# Patient Record
Sex: FEMALE | Race: BLACK OR AFRICAN AMERICAN | Employment: UNEMPLOYED | URBAN - METROPOLITAN AREA
[De-identification: names, ages, dates, MRNs, and addresses within clinical notes are randomized per-mention and may not be internally consistent; named-entity substitution may affect disease eponyms.]

---

## 2019-01-16 ENCOUNTER — APPOINTMENT (OUTPATIENT)
Dept: GENERAL RADIOLOGY | Age: 39
End: 2019-01-16
Payer: COMMERCIAL

## 2019-01-16 ENCOUNTER — HOSPITAL ENCOUNTER (EMERGENCY)
Age: 39
Discharge: HOME | End: 2019-01-16
Payer: COMMERCIAL

## 2019-01-16 VITALS
RESPIRATION RATE: 14 BRPM | TEMPERATURE: 98.7 F | DIASTOLIC BLOOD PRESSURE: 86 MMHG | HEART RATE: 76 BPM | WEIGHT: 257 LBS | SYSTOLIC BLOOD PRESSURE: 170 MMHG | OXYGEN SATURATION: 98 % | HEIGHT: 68 IN | BODY MASS INDEX: 38.95 KG/M2

## 2019-01-16 DIAGNOSIS — S39.012A BACK STRAIN, INITIAL ENCOUNTER: Primary | ICD-10-CM

## 2019-01-16 LAB
HCG, URINE, POC: NEGATIVE
Lab: NORMAL
NEGATIVE QC PASS/FAIL: NORMAL
POSITIVE QC PASS/FAIL: NORMAL

## 2019-01-16 PROCEDURE — 6360000002 HC RX W HCPCS: Performed by: PHYSICIAN ASSISTANT

## 2019-01-16 PROCEDURE — 99283 EMERGENCY DEPT VISIT LOW MDM: CPT

## 2019-01-16 PROCEDURE — 96372 THER/PROPH/DIAG INJ SC/IM: CPT

## 2019-01-16 PROCEDURE — 72110 X-RAY EXAM L-2 SPINE 4/>VWS: CPT

## 2019-01-16 RX ORDER — ORPHENADRINE CITRATE 30 MG/ML
60 INJECTION INTRAMUSCULAR; INTRAVENOUS ONCE
Status: COMPLETED | OUTPATIENT
Start: 2019-01-16 | End: 2019-01-16

## 2019-01-16 RX ORDER — KETOROLAC TROMETHAMINE 30 MG/ML
30 INJECTION, SOLUTION INTRAMUSCULAR; INTRAVENOUS ONCE
Status: COMPLETED | OUTPATIENT
Start: 2019-01-16 | End: 2019-01-16

## 2019-01-16 RX ORDER — CYCLOBENZAPRINE HCL 10 MG
10 TABLET ORAL 3 TIMES DAILY PRN
Qty: 15 TABLET | Refills: 0 | Status: SHIPPED | OUTPATIENT
Start: 2019-01-16 | End: 2019-01-21

## 2019-01-16 RX ORDER — NAPROXEN 500 MG/1
500 TABLET ORAL 2 TIMES DAILY
Qty: 14 TABLET | Refills: 0 | Status: SHIPPED | OUTPATIENT
Start: 2019-01-16 | End: 2019-07-07 | Stop reason: ALTCHOICE

## 2019-01-16 RX ADMIN — ORPHENADRINE CITRATE 60 MG: 30 INJECTION INTRAMUSCULAR; INTRAVENOUS at 17:34

## 2019-01-16 RX ADMIN — KETOROLAC TROMETHAMINE 30 MG: 30 INJECTION INTRAMUSCULAR; INTRAVENOUS at 17:34

## 2019-01-16 ASSESSMENT — PAIN SCALES - GENERAL
PAINLEVEL_OUTOF10: 9
PAINLEVEL_OUTOF10: 9

## 2019-01-16 ASSESSMENT — PAIN DESCRIPTION - FREQUENCY: FREQUENCY: CONTINUOUS

## 2019-01-16 ASSESSMENT — PAIN DESCRIPTION - PAIN TYPE: TYPE: ACUTE PAIN

## 2019-01-16 ASSESSMENT — PAIN DESCRIPTION - LOCATION: LOCATION: BACK

## 2019-01-16 ASSESSMENT — PAIN DESCRIPTION - DESCRIPTORS: DESCRIPTORS: SHARP

## 2019-01-16 ASSESSMENT — PAIN DESCRIPTION - PROGRESSION: CLINICAL_PROGRESSION: GRADUALLY WORSENING

## 2019-01-16 ASSESSMENT — PAIN DESCRIPTION - ONSET: ONSET: GRADUAL

## 2019-01-16 ASSESSMENT — PAIN DESCRIPTION - ORIENTATION: ORIENTATION: LOWER

## 2019-02-05 ENCOUNTER — HOSPITAL ENCOUNTER (OUTPATIENT)
Dept: ULTRASOUND IMAGING | Age: 39
Discharge: HOME | End: 2019-02-07
Payer: COMMERCIAL

## 2019-02-05 DIAGNOSIS — N94.6 DYSMENORRHEA: ICD-10-CM

## 2019-05-10 ENCOUNTER — HOSPITAL ENCOUNTER (EMERGENCY)
Age: 39
Discharge: HOME | End: 2019-05-10
Payer: COMMERCIAL

## 2019-05-10 VITALS
TEMPERATURE: 98.8 F | DIASTOLIC BLOOD PRESSURE: 87 MMHG | HEART RATE: 90 BPM | RESPIRATION RATE: 18 BRPM | SYSTOLIC BLOOD PRESSURE: 159 MMHG | OXYGEN SATURATION: 96 %

## 2019-05-10 DIAGNOSIS — K08.89 ODONTALGIA: Primary | ICD-10-CM

## 2019-05-10 PROCEDURE — 6370000000 HC RX 637 (ALT 250 FOR IP): Performed by: PHYSICIAN ASSISTANT

## 2019-05-10 PROCEDURE — 99282 EMERGENCY DEPT VISIT SF MDM: CPT

## 2019-05-10 RX ORDER — AMOXICILLIN AND CLAVULANATE POTASSIUM 875; 125 MG/1; MG/1
1 TABLET, FILM COATED ORAL 2 TIMES DAILY
Qty: 20 TABLET | Refills: 0 | Status: SHIPPED | OUTPATIENT
Start: 2019-05-10 | End: 2019-05-20

## 2019-05-10 RX ORDER — OXYCODONE HYDROCHLORIDE AND ACETAMINOPHEN 5; 325 MG/1; MG/1
1 TABLET ORAL ONCE
Status: COMPLETED | OUTPATIENT
Start: 2019-05-10 | End: 2019-05-10

## 2019-05-10 RX ORDER — OXYCODONE HYDROCHLORIDE AND ACETAMINOPHEN 5; 325 MG/1; MG/1
1 TABLET ORAL EVERY 6 HOURS PRN
Qty: 8 TABLET | Refills: 0 | Status: SHIPPED | OUTPATIENT
Start: 2019-05-10 | End: 2019-05-12

## 2019-05-10 RX ADMIN — OXYCODONE HYDROCHLORIDE AND ACETAMINOPHEN 1 TABLET: 5; 325 TABLET ORAL at 10:32

## 2019-05-10 ASSESSMENT — PAIN DESCRIPTION - PAIN TYPE: TYPE: ACUTE PAIN

## 2019-05-10 ASSESSMENT — PAIN DESCRIPTION - FREQUENCY: FREQUENCY: CONTINUOUS

## 2019-05-10 ASSESSMENT — PAIN SCALES - GENERAL
PAINLEVEL_OUTOF10: 10
PAINLEVEL_OUTOF10: 10

## 2019-05-10 ASSESSMENT — PAIN DESCRIPTION - LOCATION: LOCATION: MOUTH

## 2019-05-10 NOTE — ED PROVIDER NOTES
HPI:  5/10/19,   Time: 9:34 AM         Alanis Clifford is a 41 y.o. female presenting to the ED for pain after tooth extraction, beginning 2 days ago. The complaint has been persistent, moderate in severity, and worsened by eating and movement of mouth. Patient had 4 teeth pulled by fresh dental 2 days ago. She was sent home with pain medicine that isn't helping and her face feels swollen. She is not currently on any antibiotics. He denies fevers has felt chills. Patient denies vomiting or diarrhea. Patient is having swelling to posterior thighs her face since having the teeth pulled. She does have a follow-up appointment with them is on the 20th. Patient was given norco for pain and states it is not helping. ROS:   Pertinent positives and negatives are stated within HPI, all other systems reviewed and are negative.  --------------------------------------------- PAST HISTORY ---------------------------------------------  Past Medical History:  has no past medical history on file. Past Surgical History:  has a past surgical history that includes Tubal ligation; Tympanostomy tube placement; and Cholecystectomy. Social History:  reports that she quit smoking about 17 months ago. Her smoking use included cigarettes. She smoked 1.00 pack per day. She has never used smokeless tobacco. She reports that she does not drink alcohol or use drugs. Family History: family history includes Coronary Art Dis in her mother; Heart Attack in her father; Heart Disease in her mother. The patients home medications have been reviewed. Allergies: Patient has no known allergies. -------------------------------------------------- RESULTS -------------------------------------------------  All laboratory and radiology results have been personally reviewed by myself   LABS:  No results found for this visit on 05/10/19.     RADIOLOGY:  Interpreted by Radiologist.  No orders to display       ------------------------- NURSING NOTES AND VITALS REVIEWED ---------------------------   The nursing notes within the ED encounter and vital signs as below have been reviewed. BP (!) 159/87   Pulse 90   Temp 98.8 °F (37.1 °C) (Oral)   Resp 18   SpO2 96%   Oxygen Saturation Interpretation: Normal      ---------------------------------------------------PHYSICAL EXAM--------------------------------------      Constitutional/General: Alert and oriented x3, well appearing, non toxic in NAD  Head: NC/AT. The face appears slightly swollen bilaterally. Eyes: PERRL, EOMI  Mouth: Oropharynx clear, handling secretions, no trismus. Tooth extractions at 5, 12, 16 and 32. Stitches are in place at #12. There is no dental abscess. The gum with a tooth was pulled a swollen and not erythematous and there is no drainage. Neck: Supple, full ROM, no meningeal signs. Negative lymphadenopathy. Pulmonary: Lungs clear to auscultation bilaterally, no wheezes, rales, or rhonchi. Not in respiratory distress  Cardiovascular:  Regular rate and rhythm, no murmurs, gallops, or rubs. 2+ distal pulses  Abdomen: Soft, non tender, non distended,   Extremities: Moves all extremities x 4. Warm and well perfused  Skin: warm and dry without rash  Neurologic: GCS 15,  Psych: Normal Affect      ------------------------------ ED COURSE/MEDICAL DECISION MAKING----------------------  Medications - No data to display      Medical Decision Making:    Patient was independently. We'll start her on antibiotics given worsening for pain. She does have close outpatient follow-up with her dentist. 4+ discussed with the patient she should return for any worsening symptoms. Counseling: The emergency provider has spoken with the patient and discussed todays results, in addition to providing specific details for the plan of care and counseling regarding the diagnosis and prognosis.   Questions are answered at this time and they are agreeable with the

## 2019-07-07 ENCOUNTER — HOSPITAL ENCOUNTER (EMERGENCY)
Age: 39
Discharge: HOME | End: 2019-07-07
Attending: EMERGENCY MEDICINE
Payer: COMMERCIAL

## 2019-07-07 ENCOUNTER — APPOINTMENT (OUTPATIENT)
Dept: ULTRASOUND IMAGING | Age: 39
End: 2019-07-07
Payer: COMMERCIAL

## 2019-07-07 VITALS
SYSTOLIC BLOOD PRESSURE: 138 MMHG | WEIGHT: 265 LBS | HEIGHT: 68 IN | DIASTOLIC BLOOD PRESSURE: 91 MMHG | TEMPERATURE: 97.9 F | HEART RATE: 70 BPM | OXYGEN SATURATION: 98 % | BODY MASS INDEX: 40.16 KG/M2 | RESPIRATION RATE: 20 BRPM

## 2019-07-07 DIAGNOSIS — D21.9 MYOMA: ICD-10-CM

## 2019-07-07 DIAGNOSIS — N93.9 VAGINAL BLEEDING: Primary | ICD-10-CM

## 2019-07-07 LAB
ABO/RH: NORMAL
ANION GAP SERPL CALCULATED.3IONS-SCNC: 12 MMOL/L (ref 7–16)
ANTIBODY SCREEN: NORMAL
BACTERIA: ABNORMAL /HPF
BASOPHILS ABSOLUTE: 0.04 E9/L (ref 0–0.2)
BASOPHILS RELATIVE PERCENT: 0.3 % (ref 0–2)
BILIRUBIN URINE: NEGATIVE
BLOOD, URINE: ABNORMAL
BUN BLDV-MCNC: 11 MG/DL (ref 6–20)
CALCIUM SERPL-MCNC: 9.2 MG/DL (ref 8.6–10.2)
CHLORIDE BLD-SCNC: 101 MMOL/L (ref 98–107)
CLARITY: CLEAR
CO2: 25 MMOL/L (ref 22–29)
COLOR: YELLOW
CREAT SERPL-MCNC: 0.8 MG/DL (ref 0.5–1)
CRYSTALS, UA: ABNORMAL
EOSINOPHILS ABSOLUTE: 0.44 E9/L (ref 0.05–0.5)
EOSINOPHILS RELATIVE PERCENT: 3.4 % (ref 0–6)
GFR AFRICAN AMERICAN: >60
GFR NON-AFRICAN AMERICAN: >60 ML/MIN/1.73
GLUCOSE BLD-MCNC: 109 MG/DL (ref 74–99)
GLUCOSE URINE: NEGATIVE MG/DL
HCG, URINE, POC: NEGATIVE
HCT VFR BLD CALC: 35 % (ref 34–48)
HEMOGLOBIN: 11 G/DL (ref 11.5–15.5)
IMMATURE GRANULOCYTES #: 0.06 E9/L
IMMATURE GRANULOCYTES %: 0.5 % (ref 0–5)
KETONES, URINE: NEGATIVE MG/DL
LEUKOCYTE ESTERASE, URINE: NEGATIVE
LYMPHOCYTES ABSOLUTE: 3.32 E9/L (ref 1.5–4)
LYMPHOCYTES RELATIVE PERCENT: 25.6 % (ref 20–42)
Lab: NORMAL
MCH RBC QN AUTO: 28.4 PG (ref 26–35)
MCHC RBC AUTO-ENTMCNC: 31.4 % (ref 32–34.5)
MCV RBC AUTO: 90.2 FL (ref 80–99.9)
MONOCYTES ABSOLUTE: 0.71 E9/L (ref 0.1–0.95)
MONOCYTES RELATIVE PERCENT: 5.5 % (ref 2–12)
NEGATIVE QC PASS/FAIL: NORMAL
NEUTROPHILS ABSOLUTE: 8.38 E9/L (ref 1.8–7.3)
NEUTROPHILS RELATIVE PERCENT: 64.7 % (ref 43–80)
NITRITE, URINE: NEGATIVE
PDW BLD-RTO: 13.9 FL (ref 11.5–15)
PH UA: 6 (ref 5–9)
PLATELET # BLD: 162 E9/L (ref 130–450)
PMV BLD AUTO: 12.5 FL (ref 7–12)
POSITIVE QC PASS/FAIL: NORMAL
POTASSIUM SERPL-SCNC: 3.3 MMOL/L (ref 3.5–5)
PROTEIN UA: NEGATIVE MG/DL
RBC # BLD: 3.88 E12/L (ref 3.5–5.5)
RBC UA: ABNORMAL /HPF (ref 0–2)
SODIUM BLD-SCNC: 138 MMOL/L (ref 132–146)
SPECIFIC GRAVITY UA: >=1.03 (ref 1–1.03)
UROBILINOGEN, URINE: 0.2 E.U./DL
WBC # BLD: 13 E9/L (ref 4.5–11.5)
WBC UA: ABNORMAL /HPF (ref 0–5)

## 2019-07-07 PROCEDURE — 76856 US EXAM PELVIC COMPLETE: CPT

## 2019-07-07 PROCEDURE — 86901 BLOOD TYPING SEROLOGIC RH(D): CPT

## 2019-07-07 PROCEDURE — 6370000000 HC RX 637 (ALT 250 FOR IP): Performed by: EMERGENCY MEDICINE

## 2019-07-07 PROCEDURE — 86850 RBC ANTIBODY SCREEN: CPT

## 2019-07-07 PROCEDURE — 85025 COMPLETE CBC W/AUTO DIFF WBC: CPT

## 2019-07-07 PROCEDURE — 81001 URINALYSIS AUTO W/SCOPE: CPT

## 2019-07-07 PROCEDURE — 86900 BLOOD TYPING SEROLOGIC ABO: CPT

## 2019-07-07 PROCEDURE — 99284 EMERGENCY DEPT VISIT MOD MDM: CPT

## 2019-07-07 PROCEDURE — 80048 BASIC METABOLIC PNL TOTAL CA: CPT

## 2019-07-07 RX ORDER — NAPROXEN 500 MG/1
500 TABLET ORAL 2 TIMES DAILY
Qty: 14 TABLET | Refills: 0 | Status: SHIPPED | OUTPATIENT
Start: 2019-07-07 | End: 2020-03-15

## 2019-07-07 RX ORDER — POTASSIUM CHLORIDE 20 MEQ/1
40 TABLET, EXTENDED RELEASE ORAL ONCE
Status: COMPLETED | OUTPATIENT
Start: 2019-07-07 | End: 2019-07-07

## 2019-07-07 RX ADMIN — POTASSIUM CHLORIDE 40 MEQ: 20 TABLET, EXTENDED RELEASE ORAL at 19:01

## 2019-07-07 ASSESSMENT — PAIN SCALES - GENERAL: PAINLEVEL_OUTOF10: 9

## 2019-07-08 NOTE — ED PROVIDER NOTES
4.5 - 11.5 E9/L    RBC 3.88 3.50 - 5.50 E12/L    Hemoglobin 11.0 (L) 11.5 - 15.5 g/dL    Hematocrit 35.0 34.0 - 48.0 %    MCV 90.2 80.0 - 99.9 fL    MCH 28.4 26.0 - 35.0 pg    MCHC 31.4 (L) 32.0 - 34.5 %    RDW 13.9 11.5 - 15.0 fL    Platelets 034 739 - 198 E9/L    MPV 12.5 (H) 7.0 - 12.0 fL    Neutrophils % 64.7 43.0 - 80.0 %    Immature Granulocytes % 0.5 0.0 - 5.0 %    Lymphocytes % 25.6 20.0 - 42.0 %    Monocytes % 5.5 2.0 - 12.0 %    Eosinophils % 3.4 0.0 - 6.0 %    Basophils % 0.3 0.0 - 2.0 %    Neutrophils # 8.38 (H) 1.80 - 7.30 E9/L    Immature Granulocytes # 0.06 E9/L    Lymphocytes # 3.32 1.50 - 4.00 E9/L    Monocytes # 0.71 0.10 - 0.95 E9/L    Eosinophils # 0.44 0.05 - 0.50 E9/L    Basophils # 0.04 0.00 - 0.20 T9/G   Basic Metabolic Panel   Result Value Ref Range    Sodium 138 132 - 146 mmol/L    Potassium 3.3 (L) 3.5 - 5.0 mmol/L    Chloride 101 98 - 107 mmol/L    CO2 25 22 - 29 mmol/L    Anion Gap 12 7 - 16 mmol/L    Glucose 109 (H) 74 - 99 mg/dL    BUN 11 6 - 20 mg/dL    CREATININE 0.8 0.5 - 1.0 mg/dL    GFR Non-African American >60 >=60 mL/min/1.73    GFR African American >60     Calcium 9.2 8.6 - 10.2 mg/dL   Urinalysis   Result Value Ref Range    Color, UA Yellow Straw/Yellow    Clarity, UA Clear Clear    Glucose, Ur Negative Negative mg/dL    Bilirubin Urine Negative Negative    Ketones, Urine Negative Negative mg/dL    Specific Gravity, UA >=1.030 1.005 - 1.030    Blood, Urine LARGE (A) Negative    pH, UA 6.0 5.0 - 9.0    Protein, UA Negative Negative mg/dL    Urobilinogen, Urine 0.2 <2.0 E.U./dL    Nitrite, Urine Negative Negative    Leukocyte Esterase, Urine Negative Negative   Microscopic Urinalysis   Result Value Ref Range    WBC, UA NONE 0 - 5 /HPF    RBC, UA 2-5 0 - 2 /HPF    Bacteria, UA RARE (A) /HPF    Crystals Many    POC Pregnancy Urine   Result Value Ref Range    HCG, Urine, POC Negative Negative    Lot Number [id number]     Positive QC Pass/Fail Pass     Negative QC Pass/Fail Pass TYPE AND SCREEN   Result Value Ref Range    ABO/Rh O POS     Antibody Screen NEG        RADIOLOGY:  Interpreted by Radiologist.  US PELVIS COMPLETE   Final Result      Mild the myomatous changes in the uterus. Central endometrial stripe measuring up to 15 mm to be correlated   clinically. Gynecological evaluation including papa smear is   recommended on routine bases are. Unremarkable appearance for the ovaries. ------------------------- NURSING NOTES AND VITALS REVIEWED ---------------------------   The nursing notes within the ED encounter and vital signs as below have been reviewed. BP (!) 138/91   Pulse 70   Temp 97.9 °F (36.6 °C) (Oral)   Resp 20   Ht 5' 8\" (1.727 m)   Wt 265 lb (120.2 kg)   LMP 07/05/2019   SpO2 98%   BMI 40.29 kg/m²   Oxygen Saturation Interpretation: Normal      ---------------------------------------------------PHYSICAL EXAM--------------------------------------      Constitutional/General: Alert and oriented x3, well appearing, non toxic in NAD  Head: Normocephalic and atraumatic  Eyes: PERRL, EOMI  Mouth: Oropharynx clear, handling secretions, no trismus  Neck: Supple, full ROM,   Pulmonary: Lungs clear to auscultation bilaterally, no wheezes, rales, or rhonchi. Not in respiratory distress  Cardiovascular:  Regular rate and rhythm, no murmurs, gallops, or rubs. 2+ distal pulses  Abdomen: Soft, non tender, non distended,   Extremities: Moves all extremities x 4. Warm and well perfused  Skin: warm and dry without rash  Neurologic: GCS 15,  Psych: Normal Affect      ------------------------------ ED COURSE/MEDICAL DECISION MAKING----------------------  Medications   potassium chloride (KLOR-CON M) extended release tablet 40 mEq (40 mEq Oral Given 7/7/19 1901)         ED COURSE:       Medical Decision Making:    She is seen and examined labs and imaging were ordered including a type and screen. Ultrasound was also performed.   After review of results of patient's hemoglobin at 11 I am comfortable discharging the patient home with normal vital signs. I did discuss the case with Dr. Kelly Eng the on-call OB/GYN he states patient should follow-up with their OB/GYN for further work-up. He is comfortable patient being discharged. Counseling: The emergency provider has spoken with the patient and discussed todays results, in addition to providing specific details for the plan of care and counseling regarding the diagnosis and prognosis. Questions are answered at this time and they are agreeable with the plan.      --------------------------------- IMPRESSION AND DISPOSITION ---------------------------------    IMPRESSION  1. Vaginal bleeding    2.  Myoma        DISPOSITION  Disposition: Discharge to home  Patient condition is stable       Reagan Juarez DO  Resident  07/07/19 [id number]

## 2020-03-15 ENCOUNTER — HOSPITAL ENCOUNTER (EMERGENCY)
Age: 40
Discharge: ANOTHER HOSPITAL | End: 2020-03-16
Attending: EMERGENCY MEDICINE
Payer: COMMERCIAL

## 2020-03-15 LAB
BACTERIA: ABNORMAL /HPF
BILIRUBIN URINE: NEGATIVE
BLOOD, URINE: ABNORMAL
CLARITY: CLEAR
COLOR: YELLOW
CRYSTALS, UA: ABNORMAL /HPF
EPITHELIAL CELLS, UA: ABNORMAL /HPF
GLUCOSE URINE: NEGATIVE MG/DL
HCG, URINE, POC: NEGATIVE
KETONES, URINE: NEGATIVE MG/DL
LEUKOCYTE ESTERASE, URINE: NEGATIVE
Lab: NORMAL
NEGATIVE QC PASS/FAIL: NORMAL
NITRITE, URINE: NEGATIVE
PH UA: 6 (ref 5–9)
POSITIVE QC PASS/FAIL: NORMAL
PROTEIN UA: NEGATIVE MG/DL
RBC UA: ABNORMAL /HPF (ref 0–2)
SPECIFIC GRAVITY UA: >=1.03 (ref 1–1.03)
UROBILINOGEN, URINE: 0.2 E.U./DL
WBC UA: ABNORMAL /HPF (ref 0–5)

## 2020-03-15 PROCEDURE — 99285 EMERGENCY DEPT VISIT HI MDM: CPT

## 2020-03-15 PROCEDURE — 81001 URINALYSIS AUTO W/SCOPE: CPT

## 2020-03-15 ASSESSMENT — ENCOUNTER SYMPTOMS
BACK PAIN: 0
SHORTNESS OF BREATH: 0
SINUS PRESSURE: 0
WHEEZING: 0
EYE REDNESS: 0
NAUSEA: 0
EYE PAIN: 0
ABDOMINAL PAIN: 0
VOMITING: 0
SORE THROAT: 0
COUGH: 0
DIARRHEA: 0
ABDOMINAL DISTENTION: 0
EYE DISCHARGE: 0

## 2020-03-15 ASSESSMENT — PAIN DESCRIPTION - ORIENTATION: ORIENTATION: MID;LEFT;RIGHT

## 2020-03-15 ASSESSMENT — PAIN DESCRIPTION - FREQUENCY: FREQUENCY: CONTINUOUS

## 2020-03-15 ASSESSMENT — PAIN DESCRIPTION - PROGRESSION: CLINICAL_PROGRESSION: GRADUALLY WORSENING

## 2020-03-15 ASSESSMENT — PAIN DESCRIPTION - LOCATION: LOCATION: BACK

## 2020-03-15 ASSESSMENT — PAIN DESCRIPTION - PAIN TYPE: TYPE: ACUTE PAIN

## 2020-03-15 ASSESSMENT — PAIN SCALES - GENERAL: PAINLEVEL_OUTOF10: 9

## 2020-03-15 ASSESSMENT — PAIN DESCRIPTION - DESCRIPTORS: DESCRIPTORS: PRESSURE;PENETRATING

## 2020-03-16 ENCOUNTER — APPOINTMENT (OUTPATIENT)
Dept: GENERAL RADIOLOGY | Age: 40
DRG: 040 | End: 2020-03-16
Attending: INTERNAL MEDICINE
Payer: COMMERCIAL

## 2020-03-16 ENCOUNTER — APPOINTMENT (OUTPATIENT)
Dept: MRI IMAGING | Age: 40
End: 2020-03-16
Payer: COMMERCIAL

## 2020-03-16 ENCOUNTER — HOSPITAL ENCOUNTER (INPATIENT)
Age: 40
LOS: 6 days | Discharge: HOME | DRG: 040 | End: 2020-03-22
Attending: INTERNAL MEDICINE | Admitting: INTERNAL MEDICINE
Payer: COMMERCIAL

## 2020-03-16 ENCOUNTER — APPOINTMENT (OUTPATIENT)
Dept: MRI IMAGING | Age: 40
DRG: 040 | End: 2020-03-16
Attending: INTERNAL MEDICINE
Payer: COMMERCIAL

## 2020-03-16 VITALS
TEMPERATURE: 97.9 F | RESPIRATION RATE: 16 BRPM | DIASTOLIC BLOOD PRESSURE: 84 MMHG | WEIGHT: 267 LBS | HEIGHT: 69 IN | HEART RATE: 80 BPM | OXYGEN SATURATION: 99 % | SYSTOLIC BLOOD PRESSURE: 134 MMHG | BODY MASS INDEX: 39.55 KG/M2

## 2020-03-16 PROBLEM — S24.119A: Status: ACTIVE | Noted: 2020-03-16

## 2020-03-16 LAB
ANION GAP SERPL CALCULATED.3IONS-SCNC: 10 MMOL/L (ref 7–16)
BASOPHILS ABSOLUTE: 0.04 E9/L (ref 0–0.2)
BASOPHILS RELATIVE PERCENT: 0.3 % (ref 0–2)
BUN BLDV-MCNC: 10 MG/DL (ref 6–20)
C-REACTIVE PROTEIN: 1.1 MG/DL (ref 0–0.4)
C-REACTIVE PROTEIN: 1.2 MG/DL (ref 0–0.4)
CALCIUM SERPL-MCNC: 9.1 MG/DL (ref 8.6–10.2)
CHLORIDE BLD-SCNC: 102 MMOL/L (ref 98–107)
CO2: 23 MMOL/L (ref 22–29)
CREAT SERPL-MCNC: 0.7 MG/DL (ref 0.5–1)
EOSINOPHILS ABSOLUTE: 0.58 E9/L (ref 0.05–0.5)
EOSINOPHILS RELATIVE PERCENT: 4.6 % (ref 0–6)
GFR AFRICAN AMERICAN: >60
GFR NON-AFRICAN AMERICAN: >60 ML/MIN/1.73
GLUCOSE BLD-MCNC: 90 MG/DL (ref 74–99)
HCT VFR BLD CALC: 33.6 % (ref 34–48)
HEMOGLOBIN: 10.1 G/DL (ref 11.5–15.5)
HOMOCYSTEINE: 7 UMOL/L (ref 0–15)
IMMATURE GRANULOCYTES #: 0.08 E9/L
IMMATURE GRANULOCYTES %: 0.6 % (ref 0–5)
LACTIC ACID: 1.7 MMOL/L (ref 0.5–2.2)
LYMPHOCYTES ABSOLUTE: 2.92 E9/L (ref 1.5–4)
LYMPHOCYTES RELATIVE PERCENT: 23.1 % (ref 20–42)
MCH RBC QN AUTO: 24.3 PG (ref 26–35)
MCHC RBC AUTO-ENTMCNC: 30.1 % (ref 32–34.5)
MCV RBC AUTO: 81 FL (ref 80–99.9)
MONOCYTES ABSOLUTE: 0.89 E9/L (ref 0.1–0.95)
MONOCYTES RELATIVE PERCENT: 7 % (ref 2–12)
NEUTROPHILS ABSOLUTE: 8.13 E9/L (ref 1.8–7.3)
NEUTROPHILS RELATIVE PERCENT: 64.4 % (ref 43–80)
PDW BLD-RTO: 16.9 FL (ref 11.5–15)
PLATELET # BLD: 208 E9/L (ref 130–450)
PMV BLD AUTO: 12 FL (ref 7–12)
POTASSIUM SERPL-SCNC: 3.9 MMOL/L (ref 3.5–5)
RBC # BLD: 4.15 E12/L (ref 3.5–5.5)
RHEUMATOID FACTOR: 10 IU/ML (ref 0–13)
SEDIMENTATION RATE, ERYTHROCYTE: 11 MM/HR (ref 0–20)
SEDIMENTATION RATE, ERYTHROCYTE: 17 MM/HR (ref 0–20)
SODIUM BLD-SCNC: 135 MMOL/L (ref 132–146)
VITAMIN B-12: 488 PG/ML (ref 211–946)
WBC # BLD: 12.6 E9/L (ref 4.5–11.5)

## 2020-03-16 PROCEDURE — 83605 ASSAY OF LACTIC ACID: CPT

## 2020-03-16 PROCEDURE — 86431 RHEUMATOID FACTOR QUANT: CPT

## 2020-03-16 PROCEDURE — 97166 OT EVAL MOD COMPLEX 45 MIN: CPT

## 2020-03-16 PROCEDURE — 89051 BODY FLUID CELL COUNT: CPT

## 2020-03-16 PROCEDURE — 97530 THERAPEUTIC ACTIVITIES: CPT

## 2020-03-16 PROCEDURE — 87040 BLOOD CULTURE FOR BACTERIA: CPT

## 2020-03-16 PROCEDURE — 84165 PROTEIN E-PHORESIS SERUM: CPT

## 2020-03-16 PROCEDURE — 80074 ACUTE HEPATITIS PANEL: CPT

## 2020-03-16 PROCEDURE — 71555 MRI ANGIO CHEST W OR W/O DYE: CPT

## 2020-03-16 PROCEDURE — 80048 BASIC METABOLIC PNL TOTAL CA: CPT

## 2020-03-16 PROCEDURE — A9579 GAD-BASE MR CONTRAST NOS,1ML: HCPCS | Performed by: RADIOLOGY

## 2020-03-16 PROCEDURE — 83516 IMMUNOASSAY NONANTIBODY: CPT

## 2020-03-16 PROCEDURE — 6370000000 HC RX 637 (ALT 250 FOR IP): Performed by: INTERNAL MEDICINE

## 2020-03-16 PROCEDURE — 86140 C-REACTIVE PROTEIN: CPT

## 2020-03-16 PROCEDURE — 99254 IP/OBS CNSLTJ NEW/EST MOD 60: CPT | Performed by: PSYCHIATRY & NEUROLOGY

## 2020-03-16 PROCEDURE — 85025 COMPLETE CBC W/AUTO DIFF WBC: CPT

## 2020-03-16 PROCEDURE — 86618 LYME DISEASE ANTIBODY: CPT

## 2020-03-16 PROCEDURE — 85651 RBC SED RATE NONAUTOMATED: CPT

## 2020-03-16 PROCEDURE — 6360000004 HC RX CONTRAST MEDICATION: Performed by: RADIOLOGY

## 2020-03-16 PROCEDURE — 36415 COLL VENOUS BLD VENIPUNCTURE: CPT

## 2020-03-16 PROCEDURE — 97162 PT EVAL MOD COMPLEX 30 MIN: CPT

## 2020-03-16 PROCEDURE — 6360000002 HC RX W HCPCS: Performed by: EMERGENCY MEDICINE

## 2020-03-16 PROCEDURE — 86592 SYPHILIS TEST NON-TREP QUAL: CPT

## 2020-03-16 PROCEDURE — 72157 MRI CHEST SPINE W/O & W/DYE: CPT

## 2020-03-16 PROCEDURE — 1200000000 HC SEMI PRIVATE

## 2020-03-16 PROCEDURE — 83090 ASSAY OF HOMOCYSTEINE: CPT

## 2020-03-16 PROCEDURE — 96375 TX/PRO/DX INJ NEW DRUG ADDON: CPT

## 2020-03-16 PROCEDURE — 2580000003 HC RX 258: Performed by: INTERNAL MEDICINE

## 2020-03-16 PROCEDURE — 82164 ANGIOTENSIN I ENZYME TEST: CPT

## 2020-03-16 PROCEDURE — 96374 THER/PROPH/DIAG INJ IV PUSH: CPT

## 2020-03-16 PROCEDURE — 82607 VITAMIN B-12: CPT

## 2020-03-16 PROCEDURE — 6360000002 HC RX W HCPCS

## 2020-03-16 PROCEDURE — 86255 FLUORESCENT ANTIBODY SCREEN: CPT

## 2020-03-16 PROCEDURE — 86147 CARDIOLIPIN ANTIBODY EA IG: CPT

## 2020-03-16 PROCEDURE — 86593 SYPHILIS TEST NON-TREP QUANT: CPT

## 2020-03-16 PROCEDURE — 86038 ANTINUCLEAR ANTIBODIES: CPT

## 2020-03-16 PROCEDURE — 86703 HIV-1/HIV-2 1 RESULT ANTBDY: CPT

## 2020-03-16 PROCEDURE — 72158 MRI LUMBAR SPINE W/O & W/DYE: CPT

## 2020-03-16 RX ORDER — LORAZEPAM 2 MG/ML
INJECTION INTRAMUSCULAR
Status: COMPLETED
Start: 2020-03-16 | End: 2020-03-16

## 2020-03-16 RX ORDER — PROMETHAZINE HYDROCHLORIDE 25 MG/1
12.5 TABLET ORAL EVERY 6 HOURS PRN
Status: DISCONTINUED | OUTPATIENT
Start: 2020-03-16 | End: 2020-03-22 | Stop reason: HOSPADM

## 2020-03-16 RX ORDER — HYDRALAZINE HYDROCHLORIDE 20 MG/ML
10 INJECTION INTRAMUSCULAR; INTRAVENOUS EVERY 8 HOURS PRN
Status: DISCONTINUED | OUTPATIENT
Start: 2020-03-16 | End: 2020-03-22 | Stop reason: HOSPADM

## 2020-03-16 RX ORDER — LORAZEPAM 2 MG/ML
0.5 INJECTION INTRAMUSCULAR ONCE
Status: COMPLETED | OUTPATIENT
Start: 2020-03-16 | End: 2020-03-16

## 2020-03-16 RX ORDER — POLYETHYLENE GLYCOL 3350 17 G/17G
17 POWDER, FOR SOLUTION ORAL DAILY PRN
Status: DISCONTINUED | OUTPATIENT
Start: 2020-03-16 | End: 2020-03-22 | Stop reason: HOSPADM

## 2020-03-16 RX ORDER — ONDANSETRON 2 MG/ML
4 INJECTION INTRAMUSCULAR; INTRAVENOUS EVERY 6 HOURS PRN
Status: DISCONTINUED | OUTPATIENT
Start: 2020-03-16 | End: 2020-03-22 | Stop reason: HOSPADM

## 2020-03-16 RX ORDER — ACETAMINOPHEN 650 MG/1
650 SUPPOSITORY RECTAL EVERY 6 HOURS PRN
Status: DISCONTINUED | OUTPATIENT
Start: 2020-03-16 | End: 2020-03-22 | Stop reason: HOSPADM

## 2020-03-16 RX ORDER — SODIUM CHLORIDE 0.9 % (FLUSH) 0.9 %
10 SYRINGE (ML) INJECTION PRN
Status: DISCONTINUED | OUTPATIENT
Start: 2020-03-16 | End: 2020-03-22 | Stop reason: HOSPADM

## 2020-03-16 RX ORDER — ACETAMINOPHEN 325 MG/1
650 TABLET ORAL EVERY 6 HOURS PRN
Status: DISCONTINUED | OUTPATIENT
Start: 2020-03-16 | End: 2020-03-22 | Stop reason: HOSPADM

## 2020-03-16 RX ORDER — SODIUM CHLORIDE 0.9 % (FLUSH) 0.9 %
10 SYRINGE (ML) INJECTION EVERY 12 HOURS SCHEDULED
Status: DISCONTINUED | OUTPATIENT
Start: 2020-03-16 | End: 2020-03-22 | Stop reason: HOSPADM

## 2020-03-16 RX ADMIN — Medication 10 ML: at 21:57

## 2020-03-16 RX ADMIN — LORAZEPAM 0.5 MG: 2 INJECTION INTRAMUSCULAR at 14:03

## 2020-03-16 RX ADMIN — GADOTERIDOL 20 ML: 279.3 INJECTION, SOLUTION INTRAVENOUS at 01:36

## 2020-03-16 RX ADMIN — ACETAMINOPHEN 650 MG: 325 TABLET ORAL at 20:01

## 2020-03-16 RX ADMIN — SODIUM CHLORIDE, PRESERVATIVE FREE 10 ML: 5 INJECTION INTRAVENOUS at 14:03

## 2020-03-16 RX ADMIN — GADOTERIDOL 20 ML: 279.3 INJECTION, SOLUTION INTRAVENOUS at 15:24

## 2020-03-16 RX ADMIN — LORAZEPAM 0.5 MG: 2 INJECTION, SOLUTION INTRAMUSCULAR; INTRAVENOUS at 14:03

## 2020-03-16 RX ADMIN — LORAZEPAM 0.5 MG: 2 INJECTION, SOLUTION INTRAMUSCULAR; INTRAVENOUS at 00:24

## 2020-03-16 RX ADMIN — Medication 10 ML: at 08:08

## 2020-03-16 ASSESSMENT — PAIN SCALES - GENERAL
PAINLEVEL_OUTOF10: 0
PAINLEVEL_OUTOF10: 0
PAINLEVEL_OUTOF10: 8
PAINLEVEL_OUTOF10: 0
PAINLEVEL_OUTOF10: 0

## 2020-03-16 ASSESSMENT — PAIN DESCRIPTION - PROGRESSION: CLINICAL_PROGRESSION: GRADUALLY WORSENING

## 2020-03-16 NOTE — CARE COORDINATION
Met with Patient at bedside to explain CM role and discuss transition of care. Patient lives in a 2 story home with bedroom and bath on second floor with spouse and 3 teenage children. She has no DME. Her PCP is [facility] and she uses [facility]. Per discussion with therapy ,patient may benefit from Acute Rehab or [facility] for shorterm then Oupatient therapy. If Acute rehab is indicated, will need order for [facility] referral. Patient declined list and would like [facility] Acute [facility]hab. She also declined list for [facility] and would like to use [facility]. If discharge [plan is home patient will need order for viviana walker. Will await Neurology eval and progress with therapy to help determine best transition of care plan. CM/SW will continue to follow.

## 2020-03-16 NOTE — PROGRESS NOTES
Distally to R foot  Proprioception: RLE Deficits  Edema:  WNL      Patient education  Pt educated on role of PT    Patient response to education:   Pt verbalized understanding Pt demonstrated skill Pt requires further education in this area   x x x     ASSESSMENT:    Comments:  Pt received in supine agreeable to PT evaluation. Pt noted to have RLE sensation loss, strength, and proprioception deficits. Pt performing bed mobility without physical assistance. Pt requiring steadying assistance with functional transfers. Pt demonstrates poor safety with WW ambulating outside [facility] base of support. Pt with intermittent self corrected knee buckling with RLE relying on BUE support. Pt with poor carryover of verbal cues with [facility] safety. Pt performed stair negotiation with verbal cues for positioning and sequencing. Pt with gross LOB requiring assistance to correct. Patient would benefit from continued skilled PT to maximize functional mobility independence. Treatment:  Patient practiced and was instructed in the following treatment:     Functional transfers- Verbal cues for proper positioning and sequencing to perform transfers safely with maximum independence.  Gait training- Verbal cues for proper positioning and sequencing using assistive device to maximize functional mobility independence.  Stair negotiation- verbal cues for positioning and sequencing to promote independence and safety. Pt's/ family goals   1. Get better    Patient and or family understand(s) diagnosis, prognosis, and plan of care. yes    PLAN:    PT care will be provided in accordance with the objectives noted above. Exercises and functional mobility practice will be used as well as appropriate assistive devices or modalities to obtain goals. Patient and family education will also be administered as needed. Frequency of treatments: 5-7x/week x 1-2 weeks.     Time in  1037  Time out  1107    Total Treatment Time  25 minutes     Evaluation

## 2020-03-16 NOTE — PROGRESS NOTES
Acute Rehab Pre-Admission Screen      Referral date: 3/16/20  Onset/Hospital Admit Date: 3/16/2020  4:56 AM    Current Location: [facility]/[address]    Name: Sadia Burciaga  YOB: 1980  Age: 46 y.o. Admitting Diagnosis: spinal cord lesion   Address: [address] [place] [address]. [place] [address]  Home Phone: [phone] (home)  [address]:     Sex: female  Race:   Marital Status:    Ethnic/Cultural/Mosque Considerations: none    Advanced Directives: [x] Full Code  [] [address] [] Medications only       [] Living Will  [] DPOA      []Organ donor      [] No mechanical breathing or ventilation     [] no tube feeding, nutrition or hydration      [x] Patient does not have advanced directives or living will       COVERAGE INFORMATION   Primary Insurer: [facility]  Payor Contact:   Phone:   FAX:  Authorization #:     Medicare #:   Medicaid #:   Verified coverage: [] Patient  [] Family/caregiver    [x] financial department [] insurance carrier    MEDICAL UPDATE:  History of present admission: Pt presented to [facility] ED on 3/15/20 with back pain, numbness to back and right side from chest wall to foot, and abdominal pain x3 days. Past medical history includes HTN and tobacco use. MRI revealed a spinal cord lesion at T7 with swelling from T6-T8. MRI lumbar spine showed small central L3-L4 disc protrusion but no abscess or spinal lesion. She was transferred downtown. Neurology consulted and ordered LP and lab work.       PHYSICIAN / REFERRAL INFORMATION  Referring Physician: Simran Meza  Attending Physician: Eliane Hall DO  Primary Care Physician: RAUL Croft CNP  Consultants/Opinions (see full consult notes on chart): Manasa Segovia (neuro) rec: lab work, urine tests, IV steriods    SOCIAL INFORMATION  Primary  Contact: Geno Mcpherson  Relationship: spouse  Primary Phone: [phone]      Previous Community Services: none  Caregiver available: [x] Yes [] No Hours per day available: to be determined  Patient previously employed:  [x] Yes [] Part Time [] Full Time [] No [] Retired  Occupation/Profession: [employer]  Prior living arrangements: [x] Home  [] Assisted living  [] SNF [] Other  Lived with:  [] Alone  [x] Spouse  [x] Family  [] Other  Lived with: [address] [facility] phone: [phone]  Home:  [address] [place] home  6 entry steps  Rails: 1  Steps:  flight to 2nd floor  Rails:  0   Bedroom: [] 1st floor  [x] 2nd floor    Bathroom:  [] 1st floor  [x] 2nd floor    Prior Functional Level: Independent for: all  Dominant hand: [x] Right  [] Left    Previous Home Equipment:  [] Cane [] Grab bars [] Orthotic / prosthetic   [] Shower chair [] Tub bench  [] 3-in-1 Commode [] Long handle sponge   [] Oxygen [] Sock aide  [] Wheelchair  [] motorized wc/scooter  [] Wheelchair cushion   [] Crutches [] Long handle shoehorn  [] Reachers [] Toilet seat elevator [] Rollator  [] Walker(wheeled)   [] Walker(standard) [] Mechanical lift    [x] None of the above     Has patient had 2 or more falls in the past year or any fall with injury in the past year? [] yes   [x] no   []unknown    Has patient had major surgery during past 100 days prior to admission?    [] yes   [x] no     Surgical History:  Past Surgical History:   Procedure Laterality Date    CHOLECYSTECTOMY      TUBAL LIGATION      TYMPANOSTOMY TUBE PLACEMENT         Past Medical:  Past Medical History:   Diagnosis Date    Hypertension        Current Co-morbidities:  [] Alzheimer's   [] Dysphasia     [] Parkinsonism  [] Amputation   [] GERD  [] Peripheral artery disease   [] Anemia      [] Encephalopathy  [] Peripheral vascular disease  [] Anxiety   [] Gangrene   [] Pneumonia  [] Aphasia   [] Gout    [] Polyneuropathy  [] Asthma   [] Heart Failure (diastolic) [] Post-polio syndrome  [] Atrial fibrillation  [] Heart Failure (left-sided) [] Pseudomonas enteritis   [] Blind    [] Heart Failure (right-sided) [] Pulmonary embolism  [] Cellulitis     [] Heart Failure Leukocyte Esterase, Urine Negative Negative    WBC, UA 0-1 0 - 5 /HPF    RBC, UA 1-3 0 - 2 /HPF    Epithelial Cells, UA RARE /HPF    Bacteria, UA RARE (A) None Seen /HPF    Crystals, UA Moderate (A) None Seen /HPF   Basic Metabolic Panel    Collection Time: 03/16/20  2:16 AM   Result Value Ref Range    Sodium 135 132 - 146 mmol/L    Potassium 3.9 3.5 - 5.0 mmol/L    Chloride 102 98 - 107 mmol/L    CO2 23 22 - 29 mmol/L    Anion Gap 10 7 - 16 mmol/L    Glucose 90 74 - 99 mg/dL    BUN 10 6 - 20 mg/dL    CREATININE 0.7 0.5 - 1.0 mg/dL    GFR Non-African American >60 >=60 mL/min/1.73    GFR African American >60     Calcium 9.1 8.6 - 10.2 mg/dL   CBC Auto Differential    Collection Time: 03/16/20  2:16 AM   Result Value Ref Range    WBC 12.6 (H) 4.5 - 11.5 E9/L    RBC 4.15 3.50 - 5.50 E12/L    Hemoglobin 10.1 (L) 11.5 - 15.5 g/dL    Hematocrit 33.6 (L) 34.0 - 48.0 %    MCV 81.0 80.0 - 99.9 fL    MCH 24.3 (L) 26.0 - 35.0 pg    MCHC 30.1 (L) 32.0 - 34.5 %    RDW 16.9 (H) 11.5 - 15.0 fL    Platelets 312 409 - 668 E9/L    MPV 12.0 7.0 - 12.0 fL    Neutrophils % 64.4 43.0 - 80.0 %    Immature Granulocytes % 0.6 0.0 - 5.0 %    Lymphocytes % 23.1 20.0 - 42.0 %    Monocytes % 7.0 2.0 - 12.0 %    Eosinophils % 4.6 0.0 - 6.0 %    Basophils % 0.3 0.0 - 2.0 %    Neutrophils Absolute 8.13 (H) 1.80 - 7.30 E9/L    Immature Granulocytes # 0.08 E9/L    Lymphocytes Absolute 2.92 1.50 - 4.00 E9/L    Monocytes Absolute 0.89 0.10 - 0.95 E9/L    Eosinophils Absolute 0.58 (H) 0.05 - 0.50 E9/L    Basophils Absolute 0.04 0.00 - 0.20 E9/L   Sedimentation Rate    Collection Time: 03/16/20  2:59 AM   Result Value Ref Range    Sed Rate 11 0 - 20 mm/Hr   C-reactive protein    Collection Time: 03/16/20  2:59 AM   Result Value Ref Range    CRP 1.2 (H) 0.0 - 0.4 mg/dL   Lactic Acid, Plasma    Collection Time: 03/16/20  2:59 AM   Result Value Ref Range    Lactic Acid 1.7 0.5 - 2.2 mmol/L     Mri Thoracic Spine W Wo Contrast  Addendum Date: 3/16/2020 Incontinence Bladder [] [place]  Insertion date:   []Hemodialysis and  Frequency:   [] Incontinence Bowel    [x] Last bowel movement :3/15/20    [x] Substance use history:  [x] Tobacco  [x] Alcohol  [] Other     [] Ethnic  [] Cultural  [] Spiritual  [] Language [] Needs  [] Other than English  [] Hearing Impaired  [] Visually Impaired  [] Speaking Impaired  [] Blind    [] Special equipment:  [] Devices/Splints  [] Type   [] Brace   [] Type  [] Bariatric bed  [] Extra wide commode  [] Extra wide wheelchair [] Extra wide walker  [] Germain walker  [] Germain wheelchair  [] Transfer lift    [] Other equipment     FUNCTIONAL STATUS PT / Enzo Louise / Lili Marvin:  FIM / EVAL Discipline Initial: 3/16/20 Follow Up:  Current:    Eating OT Independent       Grooming OT Stand by Assist       Bathing OT Moderate Assist       Dressing Upper Extremity OT Stand by Assist       Dressing Lower Extremity OT Moderate Assist       Toileting OT Moderate Assist       Toilet Transfers OT Minimum assistance       Tub/Shower Transfers OT nt     Homemaking OT nt     Bed Mobility PT Stand by Assist       Bed/Wheelchair Transfers PT Moderate Assist       Locomotion Walk / Wheelchair  Device:  Distance: PT  40 feet x 2 with [employer]     Endurance PT      Expression SP      Social Interaction SP      Problem Solving SP      Memory SP      Comprehension SP      Swallowing SP      Bowel Management NSG      Bladder Management NSG        Comments on Functional Status:  Will benefit from 3 hours of acute rehab therapy daily to improve gait, transfers, ADLs, IADLs    [x] Able to participate a minimum of 3 hours per day of therapy intervention    Required treatments/services: [x] Rehabilitation nursing [] Dietitian / nurtition                 [x] Case management  [] Respiratory Therapy      [x] Social work   [] Other     Required Therapy:  Therapy Hours per Day Days per Week Therapeutic Interventions Required   [x] Physical Therapy 1.5 5-7 Gait, transfers, Safety, strength, education, endurance    [x] Occupational Therapy 1.5 5-7 ADLs, IADLs, Safety, strength, education, endurance   [] Speech Pathology      [] Prosthetics / Orthotics       []         Anticipated Discharge Plan:   Anticipated DME Needs:  [x] Home     [] Commode   [] Alone    [] Wheelchair   [] Supervised    [] Walker   [x] Assist    [] Oxygen        [] Hospital Bed  [] Assisted Living    [] Ramp        [x] To Be Determined      Anticipated Home Health Services:  Anticipated Outpatient Services:  [] PT       [] PT  [] OT      [] OT  [] Speech     [] Speech  [] Nursing     [] Dialysis  [] Aide      [x] To Be Determined  [x] To Be Determined    Anticipated support group:  [] Amputation  [] Multiple Sclerosis  [] Stroke  [] Brain Injury  [x] Spinal cord injury  [] Other     Barriers to discharge: none noted    Discharge Support: [] Patient lives alone and does not have a caregiver available     [x] Patient has a caregiver available     [] Discharge plan has been verified with patient's caregiver      [] Caregiver is in agreement with the discharge plan     Expected functional status for safe discharge: Modified Independent     Patient/support person goals: go home    Expected length of stay: 5-7 days    Discussed expected length of stay and agreeable to IRF plan: [x] Yes   [] No    Impairment Group Category: 4.130    Etiological Diagnosis: Spinal cord lesion    Primary Rehabilitation Diagnosis: spinal cord lesion      Electronically signed by Christianne Key RN on 3/16/2020 at 3:26 PM    Prescreen completed __________________________________ (signature of prescreener)    Date:   3/16/20 Time:  1600      JUSTIFICATION FOR ADMISSION TO ACUTE REHABILITATION:          RECOMMEND LEVEL OF CARE  Recommend inpatient rehabilitation: [] Yes   [x] No  If no indicate reason:    [x] Functional level too high  [] Unmotivated  [] No insurance carrier approval [] Unlikely to return to community  [] No medical

## 2020-03-16 NOTE — H&P
showed small central L3-L4 disc protrusion but no abscess or spinal lesion. The patient does have a history of hypertension diagnosed about 1 year ago and was prescribed some medication that the patient never started taking. Past Medical History:          Diagnosis Date    Hypertension        Past Surgical History:          Procedure Laterality Date    CHOLECYSTECTOMY      TUBAL LIGATION      TYMPANOSTOMY TUBE PLACEMENT         Medications Prior to Admission:      Prior to Admission medications    Not on File       Allergies:  Patient has no known allergies. Social History:    The patient currently lives at home. TOBACCO:   reports that she has been smoking cigarettes. She has been smoking about 0.50 packs per day. She has never used smokeless tobacco.  ETOH:   reports current alcohol use. Family History:          Problem Relation Age of Onset    Heart Disease Mother     High Blood Pressure Mother     Heart Attack Father          REVIEW OF SYSTEMS:   Pertinent positives as noted in the HPI. All other systems reviewed and were negative. PHYSICAL EXAM:  [facility] 03/09/2020   General appearance: Patient is alert, awake, oriented x 3; appears stated age and cooperative. HEENT:     Head: Normocephalic, atraumatic without obvious deformity. Eyes: Pupils equal, round, and reactive to light. Extraocular movements intact. Oral cavity / throat: Oral cavity mucosa is moist. No oropharyngeal exudates / erythema. Neck: Supple, with full range of motion. No jugular venous distention. Respiratory:  Clear to auscultation bilaterally. No crackles or wheezing on ausculation. Cardiovascular: Regular rate and rhythm with normal S1 and S2 without murmurs. Abdomen: Soft, non-tender, non-distended with normal bowel sounds. Musculoskeletal: Full range of motion in both upper and lower extremities. No obvious deformity. Extremities: No edema in upper and lower extremities bilaterally.  2+ lower extremity pulses. Skin: Warm, dry  Neurologic:  Patient is alert, awake and oriented x 3. No focal motor deficits, motor strength is 5/5 in both upper and lower extremities. . Touch sensations are diminished on right half of chest wall starting from lower chest wall (just below the right breast) involving right half of the abdominal wall, right lower extremity. Temperature sensations are altered in the similar nerve distributions zone. Deep tendon reflexes are equal on both sides. Psychiatric: Alert and oriented, thought content appropriate, normal insight. Imaging Studies including EKG have been reviewed. Labs:     Recent Labs     03/16/20 0216   WBC 12.6*   HGB 10.1*   HCT 33.6*        Recent Labs     03/16/20 0216      K 3.9      CO2 23   BUN 10   CREATININE 0.7   CALCIUM 9.1     No results for input(s): AST, ALT, BILIDIR, BILITOT, ALKPHOS in the last 72 hours. No results for input(s): INR in the last 72 hours. No results for input(s): Aaron Luis Enrique in the last 72 hours. Imaging:  No orders to display       Admission related labs and imaging studies have been reviewed by myself. ASSESSMENT:  Active Hospital Problems    Diagnosis Date Noted    Complete [facility] at unspecified level of thoracic spinal cord[facility] [S24.119A] 03/16/2020     · Paresthesias involving right side of the body with level lesion at T6. · Thoracic spine lesion T7.  · Hypertension, well controlled without medications. · Leukocytosis. This could likely be reactive. The patient does not seem to be having any clinical evidence of infection at this point. · Tobacco use, continuous. Plan:  · Neurology will be consulted. Patient may need lumbar puncture for further evaluation. · She will be started on IV hydralazine as needed for blood pressure control. · The patient was counseled about tobacco cessation. There is no height or weight on file to calculate BMI.       DVT Prophylaxis  Lovenox 40 mg subcutaneous daily has been ordered, but will hold it for now in case neurology recommends lumbar puncture. Diet  DIET GENERAL;    Code Status  Full Code    PT/OT Eval Status  Deferred at this point. Disposition  Inpatient hospitalization. Donna Baird MD  [facility]      NOTE: This report was transcribed using voice recognition software. Every effort was made to ensure accuracy; however, inadvertent computerized transcription errors may be present.

## 2020-03-16 NOTE — PROGRESS NOTES
Discussed pt with Dr. Renée Alvarez. Pt has rehab potential but is still being worked up and will need diagnosis prior to acceptance to ARU. Will follow.

## 2020-03-16 NOTE — ED NOTES
Radiology Procedure Waiver   Name: Chelsie Wright  : 1980  MRN: [id number]    Date:  3/15/20    Time: 11:59 PM EDT    Benefits of immediately proceeding with radiology exam(s) without pre-testing outweigh the risks or are not indicated as specified below and therefore the following is/are being waived:    [] Benefits of immediate radiology exam(s) outweigh any risk. OR    Pre-exam testing is not indicated for the following reason(s):  [] Pregnancy test   [] Patients LMP on-time and regular.   [] Patient had Tubal Ligation or has other Contraception Device. [] Patient  is Menopausal or Premenarcheal.    [] Patient had Full or Partial Hysterectomy. [] Protocol for CT contrast allegry   [] Patient has tolerated well previously   [] Patient does not have a true allergy    [] MRI Questionnaire     [x] BUN/Creatinine   [x] Patient age w/no hx of renal dysfunction. [] Patient on Dialysis. [] Recent Normal Labs.   Electronically signed by Nida Arenas DO on 3/15/20 at 11:59 PM EDT               Nida Arenas DO  Resident  03/15/20 [id number]

## 2020-03-16 NOTE — PROGRESS NOTES
Patient is concerned and wants to know the results of the MRI she had today. She wants to be seen tomorrow prior to receiving lumbar puncture to discuss results of testing she had today. Patient's lumbar puncture is scheduled tomorrow morning at some point. Message sent to Dr. Johanne Reynoso regarding patient's request. Dr. Johanne Reynoso stated he cannot be sure to see her in the morning prior to lumbar puncture but he strongly recommends patient get this test from results reviewed so far. July Lincoln is to notify patient that Dr. Johanne Reynoso wants patient to receive lumbar puncture.    Electronically signed by Marques Barnard RN on 3/16/2020 at 7:07 PM

## 2020-03-16 NOTE — PROGRESS NOTES
Car Ott in MRI dept updated that MRI checklist completed.     Electronically signed by Ysabel Granados RN on 3/16/2020 at 1:20 PM

## 2020-03-16 NOTE — PROGRESS NOTES
Occupational Therapy  OCCUPATIONAL THERAPY INITIAL EVALUATION      Date:3/16/2020  Patient Name: Rusty Price  MRN: [id number]  : 1980  Room: [address] [place] [address]    Evaluating OT: Earle Roldan, OTR/L #[id number]    Referring physician: Aga Khalil DO  AM-PAC Daily Activity Raw Score:   Recommended Adaptive Equipment: ww, AE for LE dressing and bathing, shower seat, elevated commode     Diagnosis: complete lesion  T7 R side numbness   Surgery:   Past Surgical History:   Procedure Laterality Date    CHOLECYSTECTOMY      TUBAL LIGATION      TYMPANOSTOMY TUBE PLACEMENT          Pertinent Medical History:   Past Medical History:   Diagnosis Date    Hypertension         Precautions:  Falls, R side sensory loss and weakness     Home Living: Pt lives with  and 3 teenagers  in a 2 story home with 6 step(s) to enter and one rail(s); bed/bath on second floor with full flight of steps and no HR   Bathroom setup: walk in shower    Equipment owned: none  Prior Level of Function:   Independent with ADLs ,  Independent with IADLs; using no AD for ambulation.    Driving: yes                           Medication Management self  Occupation: works at [employer] enjoys cooking    Pain Level: back pain 7/10   Cognition: A&O: 3/3; Follows 1-2 step directions increased cues    Memory:  fair    Sequencing:  fair    Problem solving:  good    Judgement/safety:  fair      Functional Assessment:   Initial Eval Status  Date: 3/16/20 Treatment Status  Date: Short Term Goals=LTG  Treatment frequency: PRN 2-4 x/week   Feeding Independent      Grooming SBA standing at sink with ww cue for safety  Mod I with ww    UB Dressing SBA  Independent   LB Dressing Mod A   Mod I with AE prn    Bathing Mod A recommend shower seat and LHS  Mod I    Toileting Mod  A  Mod I with ww elevated commode   Bed Mobility  Supine to sit: SBA   Sit to supine: n/t  Supine to sit: mod I    Sit to supine: mod I log rolling   Functional Transfers Sit to []    Plan of Care: 5-7 days     ADL retraining [x]   Equipment needs [x]   Neuromuscular re-education [] Energy Conservation Techniques [x]  Functional Transfer training [x] Patient and/or Family Education [x]  Functional Mobility training [x]  Environmental Modifications [x]  Cognitive re-training []   Compensatory techniques for ADLs [x]  Splinting Needs []   Positioning to improve overall function [x]   Therapeutic Activity [x]  Therapeutic Exercise  [x]  Visual/Perceptual: []    Delirium prevention/treatment  []   Other:  []    Rehab Potential: Good for established goals    Patient / Family Goal: decrease pain and increase strength     Evaluation time includes thorough review of current medical information, gathering information on past medical & social history & PLOF, completion of standardized testing, informal observation of tasks, consultation with other medical professions/disciplines, assessment of data & development of POC/goals. Patient and/or family were instructed diagnosis, prognosis/goals and plan of care. yes Demonstrated good understanding. Treatment Time In: 10:45            Treatment Time Out: 11:00             Treatment Charges: Mins Units   Ther Ex  22640     Manual Therapy 18001     Thera Activities 00035 15    ADL/Home Mgt 99151     Neuro Re-ed 78344     Group Therapy      Orthotic manage/training  12542     Non-Billable Time     Total Timed Treatment 15 1     [] Malnutrition indicators have been identified and nursing has been notified to ensure a dietitian consult is ordered. mod OT Evaluation + 15  treatment minutes    Nery Riley.  Sarah 72, Bryson 70

## 2020-03-16 NOTE — ED PROVIDER NOTES
61-year-old female who presents for evaluation of numbness. Patient reports for the past 3 days she is had constant numbness to her right side from right rib cage around T7 anteriorly and posteriorly but not crossing midline all the way down to the her right foot differentially. She denies any weakness. She denies any trauma. This never happened to her before. She reports she when she pinches her legs she cannot really feel that she is feels a dull pressure. She also reports altered temperature sensation to that side of her body. She denies any medical problems and she has never had a similar episode in the past.  She denies headache, vision changes, weakness or any other complaints. She denies abdominal pain. Review of Systems   Constitutional: Negative for chills and fever. HENT: Negative for ear pain, sinus pressure and sore throat. Eyes: Negative for pain, discharge and redness. Respiratory: Negative for cough, shortness of breath and wheezing. Cardiovascular: Negative for chest pain. Gastrointestinal: Negative for abdominal distention, abdominal pain, diarrhea, nausea and vomiting. Genitourinary: Negative for dysuria and frequency. Musculoskeletal: Negative for arthralgias and back pain. Skin: Negative for rash and wound. Neurological: Positive for numbness. Negative for tremors, seizures, speech difficulty, weakness and headaches. Hematological: Negative for adenopathy. All other systems reviewed and are negative. Physical Exam  Vitals signs and nursing note reviewed. Constitutional:       Appearance: She is well-developed. HENT:      Head: Normocephalic and atraumatic. Eyes:      Conjunctiva/sclera: Conjunctivae normal.   Neck:      Musculoskeletal: Normal range of motion and neck supple. Cardiovascular:      Rate and Rhythm: Normal rate and regular rhythm. Heart sounds: Normal heart sounds. No murmur.    Pulmonary:      Effort: Pulmonary effort is normal. No respiratory distress. Breath sounds: Normal breath sounds. No wheezing or rales. Abdominal:      General: Bowel sounds are normal.      Palpations: Abdomen is soft. Tenderness: There is no abdominal tenderness. There is no guarding or rebound. Musculoskeletal:      Comments: No midline cervical or thoracic lumbar tenderness there is midline thoracic tenderness. No signs of trauma. Skin:     General: Skin is warm and dry. Neurological:      Mental Status: She is alert and oriented to person, place, and time. Cranial Nerves: No cranial nerve deficit. Sensory: Sensory deficit (Significantly decreased sensation with loss of pinprick to entire right side just below breast to toes. Upgoing Babinski on right. 2+ patellar reflex. Slightly diminished right hip flexor but normal strength to dorsi and plantar flexion) present. Coordination: Coordination normal.          Procedures     MDM       --------------------------------------------- PAST HISTORY ---------------------------------------------  Past Medical History:  has a past medical history of Hypertension. Past Surgical History:  has a past surgical history that includes Tubal ligation; Tympanostomy tube placement; and Cholecystectomy. Social History:  reports that she has been smoking cigarettes. She has been smoking about 0.50 packs per day. She has never used smokeless tobacco. She reports current alcohol use. She reports that she does not use drugs. Family History: family history includes Coronary Art Dis in her mother; Heart Attack in her father; Heart Disease in her mother. The patients home medications have been reviewed. Allergies: Patient has no known allergies.     -------------------------------------------------- RESULTS -------------------------------------------------  Labs:  Results for orders placed or performed during the hospital encounter of 03/15/20   Urinalysis with Microscopic Result Value Ref Range    Color, UA Yellow Straw/Yellow    Clarity, UA Clear Clear    Glucose, Ur Negative Negative mg/dL    Bilirubin Urine Negative Negative    Ketones, Urine Negative Negative mg/dL    Specific Gravity, UA >=1.030 1.005 - 1.030    Blood, Urine TRACE-INTACT Negative    pH, UA 6.0 5.0 - 9.0    Protein, UA Negative Negative mg/dL    Urobilinogen, Urine 0.2 <2.0 E.U./dL    Nitrite, Urine Negative Negative    Leukocyte Esterase, Urine Negative Negative    WBC, UA 0-1 0 - 5 /HPF    RBC, UA 1-3 0 - 2 /HPF    Epithelial Cells, UA RARE /HPF    Bacteria, UA RARE (A) None Seen /HPF    Crystals, UA Moderate (A) None Seen /HPF   Basic Metabolic Panel   Result Value Ref Range    Sodium 135 132 - 146 mmol/L    Potassium 3.9 3.5 - 5.0 mmol/L    Chloride 102 98 - 107 mmol/L    CO2 23 22 - 29 mmol/L    Anion Gap 10 7 - 16 mmol/L    Glucose 90 74 - 99 mg/dL    BUN 10 6 - 20 mg/dL    CREATININE 0.7 0.5 - 1.0 mg/dL    GFR Non-African American >60 >=60 mL/min/1.73    GFR African American >60     Calcium 9.1 8.6 - 10.2 mg/dL   CBC Auto Differential   Result Value Ref Range    WBC 12.6 (H) 4.5 - 11.5 E9/L    RBC 4.15 3.50 - 5.50 E12/L    Hemoglobin 10.1 (L) 11.5 - 15.5 g/dL    Hematocrit 33.6 (L) 34.0 - 48.0 %    MCV 81.0 80.0 - 99.9 fL    MCH 24.3 (L) 26.0 - 35.0 pg    MCHC 30.1 (L) 32.0 - 34.5 %    RDW 16.9 (H) 11.5 - 15.0 fL    Platelets 078 670 - 151 E9/L    MPV 12.0 7.0 - 12.0 fL    Neutrophils % 64.4 43.0 - 80.0 %    Immature Granulocytes % 0.6 0.0 - 5.0 %    Lymphocytes % 23.1 20.0 - 42.0 %    Monocytes % 7.0 2.0 - 12.0 %    Eosinophils % 4.6 0.0 - 6.0 %    Basophils % 0.3 0.0 - 2.0 %    Neutrophils Absolute 8.13 (H) 1.80 - 7.30 E9/L    Immature Granulocytes # 0.08 E9/L    Lymphocytes Absolute 2.92 1.50 - 4.00 E9/L    Monocytes Absolute 0.89 0.10 - 0.95 E9/L    Eosinophils Absolute 0.58 (H) 0.05 - 0.50 E9/L    Basophils Absolute 0.04 0.00 - 0.20 E9/L   POC Pregnancy Urine Qual   Result Value Ref Range    HCG, patient. []   0330 Answered further questions for patient regarding information we have at this point and plans for transfer. [MF]      ED Course User Index  [MF] Caroline Brown DO           Medical Decision Makin-year-old female presents for 3 days of loss of pain and altered temperature sensation to right rib down to foot. Neurological exam is concerning for decreased pain as well as an upgoing Babinski on the right. Therefore MRI was ordered which did reveal a spinal cord lesion at T7 with swelling from T6-T8. Patient be transferred downtown for neurology valuation. Did attempt to page neurology for acute recommendations but was not able to get a hold of neurologist overnight. Patient transferred in stable condition. Counseling: The emergency provider has spoken with the patient and discussed todays results, in addition to providing specific details for the plan of care and counseling regarding the diagnosis and prognosis. Questions are answered at this time and they are agreeable with the plan.        --------------------------------- IMPRESSION AND DISPOSITION ---------------------------------    IMPRESSION  1. Spinal cord lesion ([facility]        DISPOSITION  Disposition: Transfer to [facility] to med/surg  Patient condition is stable    New Prescriptions    No medications on file       NOTE: This report was transcribed using voice recognition software.  Every effort was made to ensure accuracy; however, inadvertent computerized transcription errors may be present           Caroline Brown DO  Resident  [phone]

## 2020-03-17 ENCOUNTER — APPOINTMENT (OUTPATIENT)
Dept: CT IMAGING | Age: 40
DRG: 040 | End: 2020-03-17
Attending: INTERNAL MEDICINE
Payer: COMMERCIAL

## 2020-03-17 ENCOUNTER — APPOINTMENT (OUTPATIENT)
Dept: GENERAL RADIOLOGY | Age: 40
DRG: 040 | End: 2020-03-17
Attending: INTERNAL MEDICINE
Payer: COMMERCIAL

## 2020-03-17 LAB
ANION GAP SERPL CALCULATED.3IONS-SCNC: 10 MMOL/L (ref 7–16)
ANTI-NUCLEAR ANTIBODY (ANA): NEGATIVE
APPEARANCE CSF: CLEAR
BASOPHILS ABSOLUTE: 0.05 E9/L (ref 0–0.2)
BASOPHILS RELATIVE PERCENT: 0.4 % (ref 0–2)
BUN BLDV-MCNC: 10 MG/DL (ref 6–20)
CALCIUM SERPL-MCNC: 9 MG/DL (ref 8.6–10.2)
CHLORIDE BLD-SCNC: 102 MMOL/L (ref 98–107)
CO2: 25 MMOL/L (ref 22–29)
COLOR CSF: COLORLESS
CREAT SERPL-MCNC: 0.7 MG/DL (ref 0.5–1)
EOSINOPHILS ABSOLUTE: 0.53 E9/L (ref 0.05–0.5)
EOSINOPHILS RELATIVE PERCENT: 4.6 % (ref 0–6)
GFR AFRICAN AMERICAN: >60
GFR NON-AFRICAN AMERICAN: >60 ML/MIN/1.73
GLUCOSE BLD-MCNC: 76 MG/DL (ref 74–99)
GLUCOSE, CSF: 64 MG/DL (ref 40–70)
HAV IGM SER IA-ACNC: NORMAL
HCT VFR BLD CALC: 36 % (ref 34–48)
HEMOGLOBIN: 10.5 G/DL (ref 11.5–15.5)
HEPATITIS B CORE IGM ANTIBODY: NORMAL
HEPATITIS B SURFACE ANTIGEN INTERPRETATION: NORMAL
HEPATITIS C ANTIBODY INTERPRETATION: NORMAL
HIV-1 AND HIV-2 ANTIBODIES: NORMAL
IMMATURE GRANULOCYTES #: 0.07 E9/L
IMMATURE GRANULOCYTES %: 0.6 % (ref 0–5)
INR BLD: 1.1
LYMPHOCYTES ABSOLUTE: 3.59 E9/L (ref 1.5–4)
LYMPHOCYTES RELATIVE PERCENT: 31.3 % (ref 20–42)
MCH RBC QN AUTO: 23.7 PG (ref 26–35)
MCHC RBC AUTO-ENTMCNC: 29.2 % (ref 32–34.5)
MCV RBC AUTO: 81.3 FL (ref 80–99.9)
MONOCYTE, CSF: 100 % (ref 10–70)
MONOCYTES ABSOLUTE: 0.81 E9/L (ref 0.1–0.95)
MONOCYTES RELATIVE PERCENT: 7.1 % (ref 2–12)
NEUTROPHILS ABSOLUTE: 6.43 E9/L (ref 1.8–7.3)
NEUTROPHILS RELATIVE PERCENT: 56 % (ref 43–80)
NEUTROPHILS, CSF: 0 % (ref 0–10)
PDW BLD-RTO: 16.8 FL (ref 11.5–15)
PLATELET # BLD: 209 E9/L (ref 130–450)
PMV BLD AUTO: 12.5 FL (ref 7–12)
POTASSIUM REFLEX MAGNESIUM: 3.9 MMOL/L (ref 3.5–5)
PROTEIN CSF: 25 MG/DL (ref 15–40)
PROTHROMBIN TIME: 12.1 SEC (ref 9.3–12.4)
RBC # BLD: 4.43 E12/L (ref 3.5–5.5)
RBC CSF: <2000 /UL
RPR: NORMAL
SODIUM BLD-SCNC: 137 MMOL/L (ref 132–146)
TUBE NUMBER CSF: ABNORMAL
WBC # BLD: 11.5 E9/L (ref 4.5–11.5)
WBC CSF: <3 /UL (ref 0–2)

## 2020-03-17 PROCEDURE — 82945 GLUCOSE OTHER FLUID: CPT

## 2020-03-17 PROCEDURE — 6370000000 HC RX 637 (ALT 250 FOR IP): Performed by: NURSE PRACTITIONER

## 2020-03-17 PROCEDURE — 97535 SELF CARE MNGMENT TRAINING: CPT

## 2020-03-17 PROCEDURE — 2580000003 HC RX 258: Performed by: INTERNAL MEDICINE

## 2020-03-17 PROCEDURE — 86255 FLUORESCENT ANTIBODY SCREEN: CPT

## 2020-03-17 PROCEDURE — 97530 THERAPEUTIC ACTIVITIES: CPT

## 2020-03-17 PROCEDURE — 86618 LYME DISEASE ANTIBODY: CPT

## 2020-03-17 PROCEDURE — 1200000000 HC SEMI PRIVATE

## 2020-03-17 PROCEDURE — 83873 ASSAY OF CSF PROTEIN: CPT

## 2020-03-17 PROCEDURE — 88185 FLOWCYTOMETRY/TC ADD-ON: CPT

## 2020-03-17 PROCEDURE — 82042 OTHER SOURCE ALBUMIN QUAN EA: CPT

## 2020-03-17 PROCEDURE — 85025 COMPLETE CBC W/AUTO DIFF WBC: CPT

## 2020-03-17 PROCEDURE — 88184 FLOWCYTOMETRY/ TC 1 MARKER: CPT

## 2020-03-17 PROCEDURE — 71046 X-RAY EXAM CHEST 2 VIEWS: CPT

## 2020-03-17 PROCEDURE — 88108 CYTOPATH CONCENTRATE TECH: CPT

## 2020-03-17 PROCEDURE — 80048 BASIC METABOLIC PNL TOTAL CA: CPT

## 2020-03-17 PROCEDURE — 86592 SYPHILIS TEST NON-TREP QUAL: CPT

## 2020-03-17 PROCEDURE — 82784 ASSAY IGA/IGD/IGG/IGM EACH: CPT

## 2020-03-17 PROCEDURE — 6360000002 HC RX W HCPCS: Performed by: PSYCHIATRY & NEUROLOGY

## 2020-03-17 PROCEDURE — 84157 ASSAY OF PROTEIN OTHER: CPT

## 2020-03-17 PROCEDURE — 2580000003 HC RX 258: Performed by: PSYCHIATRY & NEUROLOGY

## 2020-03-17 PROCEDURE — 82040 ASSAY OF SERUM ALBUMIN: CPT

## 2020-03-17 PROCEDURE — 009U3ZX DRAINAGE OF SPINAL CANAL, PERCUTANEOUS APPROACH, DIAGNOSTIC: ICD-10-PCS | Performed by: RADIOLOGY

## 2020-03-17 PROCEDURE — 2709999900 FL LUMBAR PUNCTURE DIAG

## 2020-03-17 PROCEDURE — 85610 PROTHROMBIN TIME: CPT

## 2020-03-17 PROCEDURE — 70450 CT HEAD/BRAIN W/O DYE: CPT

## 2020-03-17 PROCEDURE — 36415 COLL VENOUS BLD VENIPUNCTURE: CPT

## 2020-03-17 RX ORDER — OYSTER SHELL CALCIUM WITH VITAMIN D 500; 200 MG/1; [IU]/1
1 TABLET, FILM COATED ORAL DAILY
Status: DISCONTINUED | OUTPATIENT
Start: 2020-03-17 | End: 2020-03-22 | Stop reason: HOSPADM

## 2020-03-17 RX ORDER — FAMOTIDINE 20 MG/1
20 TABLET, FILM COATED ORAL 2 TIMES DAILY
Status: DISCONTINUED | OUTPATIENT
Start: 2020-03-17 | End: 2020-03-22 | Stop reason: HOSPADM

## 2020-03-17 RX ADMIN — CALCIUM CARBONATE-VITAMIN D TAB 500 MG-200 UNIT 1 TABLET: 500-200 TAB at 18:46

## 2020-03-17 RX ADMIN — FAMOTIDINE 20 MG: 20 TABLET ORAL at 21:36

## 2020-03-17 RX ADMIN — Medication 10 ML: at 21:36

## 2020-03-17 RX ADMIN — Medication 10 ML: at 10:18

## 2020-03-17 RX ADMIN — SODIUM CHLORIDE 250 MG: 9 INJECTION, SOLUTION INTRAVENOUS at 21:36

## 2020-03-17 NOTE — INTERVAL H&P NOTE
H&P Update    Patient's History and Physical  was reviewed. The patient appears likely to able to tolerate the procedure, lumbar puncture. Risk and benefits discussed including ultimate complications, possibly death and consent obtained.     Bettie Azar PA-C

## 2020-03-17 NOTE — PLAN OF CARE
Problem: PAIN  Goal: Able to cope with pain  Description: Able to cope with pain     3/16/2020 2150 by Olegario Jacobson RN  Outcome: Met This Shift  3/16/2020 1043 by Jerilyn Reynaga RN  Outcome: Met This Shift     Problem: Falls - Risk of:  Goal: Absence of falls  3/16/2020 2150 by Olegario Jacobson RN  Outcome: Met This Shift  3/16/2020 1043 by Jerilyn Reynaga RN  Outcome: Met This Shift  Goal: Ability to remain free from injury will improve  Description: Ability to remain free from injury will improve     3/16/2020 2150 by Olegario Jacobson RN  Outcome: Met This Shift  3/16/2020 1043 by Jerilyn Reynaga RN  Outcome: Met This Shift     Problem: Pain:  Goal: Pain level will decrease  Description: Pain level will decrease  Outcome: Met This Shift  Goal: Control of acute pain  Description: Control of acute pain  Outcome: Met This Shift  Goal: Control of chronic pain  Description: Control of chronic pain  Outcome: Met This Shift     Problem: Neurological  Goal: Maximum potential motor/sensory/cognitive function  Outcome: Met This Shift

## 2020-03-17 NOTE — PROGRESS NOTES
Hospital Medicine Progress Note      Date of Admission: 3/16/2020  Hospital day # 1    Course: Follow-up on right leg weakness    Subjective    Refers still feeling right leg weakness  Stable overnight. No other overnight issues reported. Exam:    /80   Pulse 86   Temp 98.5 °F (36.9 °C) (Temporal)   Resp 15   Ht 5' 8.5\" (1.74 m) Comment: per previous assessment  Wt 267 lb (121.1 kg)   LMP 03/09/2020   SpO2 97%   BMI 40.01 kg/m²     General appearance: No apparent distress, appears stated age and cooperative. HEENT: Pupils equal, round, and reactive to light. Conjunctivae/corneas clear. Neck: Supple. No jugular venous distention. Trachea midline. Respiratory:  Normal respiratory effort. Clear to auscultation, bilaterally without Rales/Wheezes/Rhonchi. Cardiovascular: S1/S2   Abdomen: Soft, non-tender, non-distended with normal bowel sounds. Musculoskeletal: No clubbing, cyanosis or edema bilaterally. Brisk capillary refill. 2+ lower extremity pulses (dorsalis pedis).    Skin:  No rashes    Neurologic: awake, alert and following commands     Medications:  Reviewed    Infusion Medications   Scheduled Medications    sodium chloride flush  10 mL Intravenous 2 times per day    enoxaparin  40 mg Subcutaneous Daily     PRN Meds: sodium chloride flush, acetaminophen **OR** acetaminophen, polyethylene glycol, promethazine **OR** ondansetron, hydrALAZINE    I/O    Intake/Output Summary (Last 24 hours) at 3/17/2020 1106  Last data filed at 3/17/2020 0913  Gross per 24 hour   Intake 430 ml   Output --   Net 430 ml       Labs:   Recent Labs     03/16/20  0216 03/17/20  0636   WBC 12.6* 11.5   HGB 10.1* 10.5*   HCT 33.6* 36.0    209       Recent Labs     03/16/20  0216 03/17/20  0636    137   K 3.9 3.9    102   CO2 23 25   BUN 10 10   CREATININE 0.7 0.7   CALCIUM 9.1 9.0       No results for input(s): PROT, ALB, ALKPHOS, ALT, AST, BILITOT, AMYLASE, LIPASE in the last 72 hours. Recent Labs     03/17/20  0636   INR 1.1       No results for input(s): Brandee Neri in the last 72 hours. Other labs:  Lab Results   Component Value Date    TSH 2.430 02/08/2018    INR 1.1 03/17/2020       Radiology:  Imaging studies reviewed today. ASSESSMENT:     Complete lesion at unspecified level of thoracic spinal cord, initial encounter [facility]) [S24.119A] 03/16/2020     Paresthesias involving right side of the body with level lesion at T6. Thoracic spine lesion T7. Hypertension, well controlled without medications. Tobacco use, continuous. PLAN:     Continue Solu-Medrol 250 mg IV every 6 hours for 5 days  Head CT showed no evidence of acute findings  LP as per neurology recommendations  RFP, Mg, B12, Folate, LFT, TSH, HgbA1C, UA, Tox screen, ESR, CRP, SPIKE, ANCA, RF, Antiphospholipid Ab, RPR, HbA1C, Vit D, CK, MMA, Homocysteine, Hepatitis Panel, HIV Ab, UPEP/SPEP  LP: measure OP/CP, send for cell count/differential, Gluc, Protein, Myelin Basi Protein   Serum & CSF (albumin, OGBs, IgG, +/- Lyme Ab, ACE)  Serum and CSF NMO-IgG  CXR no acute findings  Solumedrol 250mg IV Q6hrs for 5 days followed by Prednisone taper (60 mg daily for 3 days, 40 mg daily for 3 days then 20 mg daily for 3 days)  PPIs + ISS  Monitor Autonomic Function, possible underlying Dysautonomia. Monitor BP, Keep SBP<180, give Labetalol/Hydralazine PRN  Heparin SQ  PT/OT      Diet: DIET GENERAL;  Code Status: Full Code    PT/OT Eval Status: [x] Ordered [] Evaluation noted [] Not applicable    DVT Prophylaxis: [x]Lovenox []Heparin []PCD [] [address] []Encouraged ambulation []N/A    Likely disposition when able:  [x]Home [] Home with [facility] [] SNF/[facility] [] Acute Rehab [] LTAC []Other    +++++++++++++++++++++++++++++++++++++++++++++++++  Stu Granger MD, Hospitalist  +++++++++++++++++++++++++++++++++++++++++++++++++  NOTE: This report was transcribed using voice recognition software.  Every effort was made to ensure accuracy; however, inadvertent computerized transcription errors may be present.

## 2020-03-17 NOTE — PROGRESS NOTES
OT BEDSIDE TREATMENT NOTE      Date:3/17/2020  Patient Name: Melany Cruz  MRN: [id number]  : 1980  Room: [address] [place] [address]     Per OT Eval:    Evaluating OT: Ricky Wiley. Navi Juarez OTR/L #[id number]     Referring physician: Aaliyah Green DO  AM-PAC Daily Activity Raw Score:   Recommended Adaptive Equipment: ww, AE for LE dressing and bathing, shower seat, elevated commode      Diagnosis: complete lesion  T7 R side numbness   Surgery:   Past Surgical History         Past Surgical History:   Procedure Laterality Date    CHOLECYSTECTOMY        TUBAL LIGATION        TYMPANOSTOMY TUBE PLACEMENT                Pertinent Medical History:   Past Medical History        Past Medical History:   Diagnosis Date    Hypertension              Precautions:  Falls, R side sensory loss and weakness     Home Living: Pt lives with  and 3 teenagers  in a 2 story home with 6 step(s) to enter and one rail(s); bed/bath on second floor with full flight of steps and no HR   Bathroom setup: walk in shower    Equipment owned: none  Prior Level of Function:   Independent with ADLs ,  Independent with IADLs; using no AD for ambulation.    Driving: yes                           Medication Management self  Occupation: works at [employer]     Pain Level: Pt did not compalin of pain this session   Cognition: A&O: 3/3; Follows 1-2 step directions increased cues               Memory:  fair               Sequencing:  fair               Problem solving:  good               Judgement/safety:  fair                 Functional Assessment:    Initial Eval Status  Date: 3/16/20 Treatment Status  Date: 3/17/20 Short Term Goals=LTG  Treatment frequency: PRN 2-4 x/week   Feeding Independent   Independent     Grooming SBA standing at sink with ww cue for safety  SBA  Pt stood at sink and washed hands Mod I with ww    UB Dressing SBA  SBA  [facility] gown Independent   LB Dressing Mod A   Mckenzie  Pt donned/doffed socks with use of sock aide and using of cross over technique    Pt educated on use of AE to assist with LB dressing task to follow of spinal neutral mechanics Mod I with AE prn    Bathing Mod A recommend shower seat and LHS  modA  Simulated   Mod I    Toileting Mod  A  DNT  modA per previous level Mod I with ww elevated commode   Bed Mobility  Supine to sit: SBA   Sit to supine: n/t SBA  Supine<>EOB  EOB<>Supine  Supine to sit: mod I    Sit to supine: mod I log rolling   Functional Transfers Sit to stand: min  A  Stand to sit:  Min  A  Stand pivot: min  A  CGA  Sit to Stand  Stand to Sit Mod I with ww   Functional Mobility Min A with ww with mod A with turns due to LOB and poor walker negotiation  Mckenzie  Pt ambulated short distance in room with w.w. Mod I with ww   Balance Sitting:     Static:  good    Dynamic:SBA  Standing: min A  Sitting:     Static:  good    Dynamic:SBA  Standing: min A       Activity Tolerance Fair + Fair+  good   Visual/  Perceptual Glasses: WFL glasses           Safety Fair+                  good      Hand dominance: R   UE ROM:        RUE:  WFL                  LUE:  WFL  Strength:        RUE:  4-/5       LUE:  4-/5   Strength: B WFL  Fine Motor Coordination:  WFL     Hearing: WFL  Sensation: decreased R abdomen down R LE - abnormal to hot cold    Tone:  WFL  Edema: none noted        Education:  Pt was educated through out treatment regarding precautions to follow,  proper technique & safety with bed mobility, functional transfers & mobility, ADL compensatory strategies to ease tasks to improve safety & prevent falls and allow pt to return home safely. Comments: Upon arrival pt lying supine in bed, agreeable to therapy. Pt completed of bed mobility, functional mobility, transfers and ADL tasks throughout session. At end of session, pt lying supine in bed, call light within reach. · Pt has made fair progress towards set goals.    · Continue with current plan of care      Treatment Time In: 11:00am Treatment Time Out: 11:14am               Treatment Charges: Mins Units   Ther Ex  12179     Manual Therapy 47596     Thera Activities 11426     ADL/Home Mgt 09955 14 1   Neuro Re-ed 42080     Group Therapy      Orthotic manage/training  62956     Non-Billable Time     Total Timed Treatment 14 1        Lorie ERICKSON/WILMAR [id number]

## 2020-03-17 NOTE — PLAN OF CARE
Problem: PAIN  Goal: Able to cope with pain  Description: Able to cope with pain     3/16/2020 2152 by Darcy Reid RN  Outcome: Met This Shift  3/16/2020 2150 by Darcy Reid RN  Outcome: Met This Shift  3/16/2020 1043 by Candida Hidalgo RN  Outcome: Met This Shift     Problem: Falls - Risk of:  Goal: Absence of falls  3/16/2020 2152 by Darcy Reid RN  Outcome: Met This Shift  3/16/2020 2150 by Darcy Reid RN  Outcome: Met This Shift  3/16/2020 1043 by Candida Hidalgo RN  Outcome: Met This Shift  Goal: Ability to remain free from injury will improve  Description: Ability to remain free from injury will improve     3/16/2020 2152 by Darcy Reid RN  Outcome: Met This Shift  3/16/2020 2150 by Darcy Reid RN  Outcome: Met This Shift  3/16/2020 1043 by Candida Hidalgo RN  Outcome: Met This Shift     Problem: Pain:  Goal: Pain level will decrease  Description: Pain level will decrease  3/16/2020 2152 by Darcy Reid RN  Outcome: Met This Shift  3/16/2020 2150 by Darcy Reid RN  Outcome: Met This Shift  Goal: Control of acute pain  Description: Control of acute pain  3/16/2020 2152 by Darcy Reid RN  Outcome: Met This Shift  3/16/2020 2150 by Darcy Reid RN  Outcome: Met This Shift  Goal: Control of chronic pain  Description: Control of chronic pain  3/16/2020 2152 by Darcy Reid RN  Outcome: Met This Shift  3/16/2020 2150 by Darcy Reid RN  Outcome: Met This Shift     Problem: Neurological  Goal: Maximum potential motor/sensory/cognitive function  3/16/2020 2152 by Darcy Reid RN  Outcome: Met This Shift  3/16/2020 2150 by Darcy Reid RN  Outcome: Met This Shift

## 2020-03-17 NOTE — PROGRESS NOTES
Physical Therapy  Facility/Department: [address] NEURO SPINE  Daily Treatment Note  NAME: Mauricio Barros  : 1980  MRN: [id number]    Date of Service: 3/17/2020     Referring Provider:  Nabeel Farmer DO     Date of Service: 3/16/2020     Evaluating PT:  Leonel Barajas PT, DPT PT [id number]     Room #:  5354/3820-D  Diagnosis:  Thoracic Spine Lesion T7  PMHx/PSHx:  HTN  Procedure/Surgery:  None  Precautions:  Spine, RLE Weakness and R sided parasthesia, R Knee Buckling  Equipment Needs:  [employer]     SUBJECTIVE:     Pt lives with spouse and 3 children in a 2 story home with 6 stairs to enter and bilateral rail. Bed is on 2nd floor and bath is on 2nd floor. Pt has flight of stairs to 2nd floor with no rails. Pt ambulated with no device independently PTA. 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 Equipment Owned: None     OBJECTIVE:    Initial Evaluation  Date: 3/16/2020 Treatment Date: 3/17/2020 Short Term/ Long Term   Goals   AM-PAC 6 Clicks 81/33 24/59      Was pt agreeable to Eval/treatment? Yes  Yes      Does pt have pain? 7/10 Low back  Moderate pressure through RLE with ambulation. Reports of increased temperature in RLE     Bed Mobility  Rolling: SBA  Supine to sit: SBA  Sit to supine: NT  Scooting: SBA  Rolling: SBA  Supine to sit: SBA  Sit to supine: NT  Scooting: SBA Rolling: Independent     Supine to sit: Independent     Sit to supine: Independent     Scooting: Independent      Transfers Sit to stand: [place]  Stand to sit: [place]  Stand pivot: ModA [employer]  Sit to stand: [place]  Stand to sit: [place]  Stand pivot: ModA [employer] Sit to stand: Modified Independent  Stand to sit: Modified Independent  Stand pivot: Modified Independent  [employer]   Ambulation    40 feet x 2 with [place]le[place] [employer] 25' x 2 ModA WW  >150 feet with Modified Independent  [employer]   Stair negotiation: ascended and descended  4 steps with ModA single rail Unable- pain in RLE and safety concerns due to gait instability.  >4 steps with no rail to allow access to bed/bath Modified Independent      ROM BUE:  See OT Note  BLE:  WFL       Strength BUE:  See OT Note  RLE: 3-/5 hip flexion/extension  3/5 knee flexion/extension  3/5 DF  2/5 PF   LLE: 4/5 grossly   Improve Strength 1/3 MMT Grade   Balance Sitting EOB:  SBA  Dynamic Standing:  ModA [employer]  Sitting EOB:  SBA  Dynamic Standing:  ModA [employer] Sitting EOB:  Independent     Dynamic Standing:  Modified Independent  WW        Pt is A & O x 4. Noted word finding difficulties. Sensation:  Parasthesia R trunk from T7 Distally to R foot  Proprioception: RLE Deficits  Edema:  WNL      Patient education  Pt educated on role of PT. Patient response to education:   Pt verbalized understanding Pt demonstrated skill Pt requires further education in this area   x x x     ASSESSMENT:    Comments:  Pt received in supine agreeable to PT. Pt performing bed mobility without assistance. Pt performing transfers with steadying assistance. Pt demonstrating increased frequency with knee buckling when in single leg stance on RLE during ambulation. Pt crossing midline with RLE as well. Verbal cues to correct. Pt noted to demonstrate R knee hyperextension with full weight bearing on RLE. Patient would benefit from continued skilled PT to maximize functional mobility independence. CM notified patient would benefit from intensive rehabilitation. Treatment:  Patient practiced and was instructed in the following treatment:     Functional transfer training- Verbal cues for proper positioning and sequencing to perform transfers safely with maximum independence.  Gait training-Verbal cues for proper positioning and sequencing using assistive device to maximize functional mobility independence. PLAN:    Patient is making good  progress towards established goals. Will continue with current POC.       Time in   0920  Time out  0946    Total Treatment Time  26 minutes     CPT codes:  [] Gait training 33983 0 minutes  [] Manual therapy 88044 0

## 2020-03-17 NOTE — PROGRESS NOTES
Patient Name: Tyrese Piña   Medical Record Number: [id number]  Date: 3/17/2020   Time: 10:06 AM   Room/Bed: 52/5217B     Request for Lumbar Puncture received, and patient's case reviewed. Will consider performing LP after imaging study to evaluate for intracranial process has been completed.        Electronically signed by Jt Washington PA-C  3/17/20 10:06 AM EDT

## 2020-03-17 NOTE — BRIEF OP NOTE
Brief Postoperative Note  ______________________________________________________________      LUMBAR PUNCTURE BRIEF PROCEDURE NOTE    Patient Name: Tyrese Piña   Medical Record Number: [id number]  Date: 3/17/20   Time: 4:55 PM EDT   Room/Bed: [address] [place] [address]    Preoperative diagnosis: lesion in thoracic spinal cord    Postoperative diagnosis: same    Procedure: Fluoroscopic-guided lumbar puncture    Anesthesia: Local anesthesia with approximately 3 mL of 1% lidocaine administered subcutaneously. Performed by: Jt Washington PA-C under indirect supervision by Jeaneth Julien II, MD.    Estimated blood loss: Minimal    Complications: None. Implants: None    Procedure details:     Informed consent was obtained from the patient prior to the procedure. The details of the procedure, as well is its risks, benefits, and alternatives, were explained. These risks include, but are not limited to, nerve root injury, bleeding, infection (including meningitis), dural leak (possibly requiring blood patch treatment), and spinal headache. The patient was provided the opportunity to ask questions, which were answered. A preprocedure timeout was performed. With the patient in a prone position on the exam table, fluoroscopy was used to select a suitable interlaminar space for lumbar puncture. The site was marked on the skin. The area was sterilely prepped and draped. Lidocaine (1%) was subcutaneously injected for local anesthesia. Under intermittent fluoroscopic guidance, a 20-gauge spinal needle was percutaneously advanced through the interlaminar space at L3 and advanced into the thecal sac, with return of clear colorless cerebrospinal fluid. Opening and closing pressure measurements were not recorded, and not perceived to be elevated. Over the course of 5 minutes, a total of approximately 9 mL of CSF was serially collected in four sterile tubes.  The specimens were submitted for laboratory analysis by Radiology staff according to the referring physician's orders. The stylet was reinserted and the needle was removed. There were no complications. A total of approximately 9 mL of clear cerebrospinal fluid was removed.     Electronically signed by Linn Armas   DD: 3/17/20  4:55 PM EDT

## 2020-03-18 LAB
ALBUMIN SERPL-MCNC: 3.1 G/DL (ref 3.5–4.7)
ALPHA-1-GLOBULIN: 0.3 G/DL (ref 0.2–0.4)
ALPHA-2-GLOBULIN: 0.7 G/FL (ref 0.5–1)
ANGIOTENSIN CONVERTING ENZYME: 38 U/L (ref 9–67)
BETA GLOBULIN: 1.2 G/DL (ref 0.8–1.3)
ELECTROPHORESIS: ABNORMAL
GAMMA GLOBULIN: 1.4 G/DL (ref 0.7–1.6)
TOTAL PROTEIN: 6.7 G/DL (ref 6.4–8.3)

## 2020-03-18 PROCEDURE — 6360000002 HC RX W HCPCS: Performed by: PSYCHIATRY & NEUROLOGY

## 2020-03-18 PROCEDURE — 1200000000 HC SEMI PRIVATE

## 2020-03-18 PROCEDURE — 2580000003 HC RX 258: Performed by: PSYCHIATRY & NEUROLOGY

## 2020-03-18 PROCEDURE — 99232 SBSQ HOSP IP/OBS MODERATE 35: CPT | Performed by: NURSE PRACTITIONER

## 2020-03-18 PROCEDURE — 2580000003 HC RX 258: Performed by: INTERNAL MEDICINE

## 2020-03-18 PROCEDURE — 97530 THERAPEUTIC ACTIVITIES: CPT

## 2020-03-18 PROCEDURE — 6370000000 HC RX 637 (ALT 250 FOR IP): Performed by: NURSE PRACTITIONER

## 2020-03-18 RX ADMIN — SODIUM CHLORIDE 250 MG: 9 INJECTION, SOLUTION INTRAVENOUS at 20:29

## 2020-03-18 RX ADMIN — SODIUM CHLORIDE 250 MG: 9 INJECTION, SOLUTION INTRAVENOUS at 04:09

## 2020-03-18 RX ADMIN — SODIUM CHLORIDE 250 MG: 9 INJECTION, SOLUTION INTRAVENOUS at 14:10

## 2020-03-18 RX ADMIN — FAMOTIDINE 20 MG: 20 TABLET ORAL at 08:52

## 2020-03-18 RX ADMIN — CALCIUM CARBONATE-VITAMIN D TAB 500 MG-200 UNIT 1 TABLET: 500-200 TAB at 08:52

## 2020-03-18 RX ADMIN — SODIUM CHLORIDE 250 MG: 9 INJECTION, SOLUTION INTRAVENOUS at 08:52

## 2020-03-18 RX ADMIN — Medication 10 ML: at 20:29

## 2020-03-18 RX ADMIN — Medication 10 ML: at 08:52

## 2020-03-18 RX ADMIN — FAMOTIDINE 20 MG: 20 TABLET ORAL at 20:29

## 2020-03-18 ASSESSMENT — PAIN SCALES - GENERAL: PAINLEVEL_OUTOF10: 0

## 2020-03-18 NOTE — PLAN OF CARE
Problem: Falls - Risk of:  Goal: Absence of falls  3/17/2020 2005 by Eneida Pratt RN  Outcome: Met This Shift     Problem: Falls - Risk of:  Goal: Ability to remain free from injury will improve  Description: Ability to remain free from injury will improve     3/17/2020 2005 by Eneida Pratt RN  Outcome: Met This Shift  3/17/2020 2004 by Eneida Pratt RN  Outcome: Met This Shift

## 2020-03-18 NOTE — PROGRESS NOTES
Ace Or is a 46 y.o. right handed female     Neurology is following for R sided paresthesia    PMH: HTN    Pt presented to [place] ED transferred here with c/o R sided numbness---from chest wall to R foot---- without RUE, upper chest, or facial involvement. Pt also describes unusual pressure to abdomen and RLE; numbness to R side of genitalia and buttocks    She had urgency incontinence in urine and stool as well. No vision changes, headache, SOB or chest pain    Vitals stable    No family present-- pt called her mom when I was reviewing testing, results, and POC    Objective:     /75   Pulse 83   Temp 97.5 °F (36.4 °C) (Temporal)   Resp 16   Ht 5' 8.5\" (1.74 m) Comment: per previous assessment  Wt 267 lb (121.1 kg)   LMP 03/09/2020   SpO2 97%   BMI 40.01 kg/m²     General appearance: alert, appears stated age, cooperative and no distress  Head: normocephalic, without obvious abnormality, atraumatic  Eyes: conjunctivae/corneas clear.  .  Neck:  supple, symmetrical, trachea midline   Lungs: clear to auscultation bilaterally  Heart: regular rate and rhythm  Extremities: normal, atraumatic, no cyanosis or edema  Pulses: 2+ and symmetric  Skin: color, texture, turgor normal---no rashes or lesions      Mental Status: Alert, oriented x4, thought content appropriate     Appropriate attention/concentration  Intact fundus of knowledge  Intact memories    Speech: no dysarthria  Language: no aphasias    Cranial Nerves:  I: smell NA   II: visual acuity  NA   II: visual fields Full to confrontation and finger counting   II: pupils PERRL   III,VII: ptosis None   III,IV,VI: extraocular muscles  EOMI without nystagmus   V: mastication Normal   V: facial light touch sensation  Normal   V,VII: corneal reflex     VII: facial muscle function - upper  Normal   VII: facial muscle function - lower Normal   VIII: hearing Normal   IX: soft palate elevation  Normal   IX,X: gag reflex    XI: trapezius strength  5/5 XI: sternocleidomastoid strength 5/5   XI: neck extension strength  5/5   XII: tongue strength  Normal     Motor:  5/5 throughout except 4/5 in proximal and distal right lower extremity  Normal bulk and tone  No abnormal movements    Sensory:  LT, PP and proprioception decreased to T8 to R side     Coordination:   FN, FFM and NI intact b/l  HKS intact to L    Gait:  Not tested for pt's safety    DTR:   Right Brachioradialis reflex 2+  Left Brachioradialis reflex 2+  Right Biceps reflex 2+  Left Biceps reflex 2+  Right Triceps reflex 2+  Left Triceps reflex 2+  Right Quadriceps reflex 0  Left Quadriceps reflex 1+  Right Achilles reflex 0  Left Achilles reflex 1+    NR R, Down to L--- Babinskis  No Mcfarlane's    No pathological reflexes    Laboratory/Radiology:     CBC:   Lab Results   Component Value Date    WBC 11.5 03/17/2020    RBC 4.43 03/17/2020    HGB 10.5 03/17/2020    HCT 36.0 03/17/2020    MCV 81.3 03/17/2020    MCH 23.7 03/17/2020    MCHC 29.2 03/17/2020    RDW 16.8 03/17/2020     03/17/2020    MPV 12.5 03/17/2020     CMP:    Lab Results   Component Value Date     03/17/2020    K 3.9 03/17/2020     03/17/2020    CO2 25 03/17/2020    BUN 10 03/17/2020    CREATININE 0.7 03/17/2020    GFRAA >60 03/17/2020    LABGLOM >60 03/17/2020    GLUCOSE 76 03/17/2020    CALCIUM 9.0 03/17/2020   U/A:    Lab Results   Component Value Date    COLORU Yellow 03/15/2020    PROTEINU Negative 03/15/2020    PHUR 6.0 03/15/2020    WBCUA 0-1 03/15/2020    RBCUA 1-3 03/15/2020    BACTERIA RARE 03/15/2020    CLARITYU Clear 03/15/2020    SPECGRAV >=1.030 03/15/2020    LEUKOCYTESUR Negative 03/15/2020    UROBILINOGEN 0.2 03/15/2020    BILIRUBINUR Negative 03/15/2020    BLOODU TRACE-INTACT 03/15/2020    GLUCOSEU Negative 03/15/2020     Lab Results   Component Value Date    TSH 2.430 02/08/2018     RPR non-reactive  SPIKE negative   Vitamin B12 488 normal  ESR 11--> 17 normal   Lactic acid 1.7 normal   Hepatitis panel non-reactive  Homocysteine 7.0 normal   HIV non-reactive  RF 10 normal   CRP 1.2 elevated     Ref. Range 3/17/2020 10:30   Appearance, CSF Latest Ref Range: Clear  Clear   Glucose, CSF Latest Ref Range: 40 - 70 mg/dL 64   Protein, CSF Latest Ref Range: 15 - 40 mg/dL 25   RBC, CSF Latest Units: /uL <2000   WBC, CSF Latest Ref Range: 0 - 2 /uL <3   Neutrophils, CSF Latest Ref Range: 0 - 10 % 0   Monocytes, CSF Latest Ref Range: 10 - 70 % 100 (H)   Color, CSF Unknown Colorless   Tube Number + CELL CT + DIFF-CSF Unknown Tube 2       FL LUMBAR PUNCTURE DIAG   Final Result   Successful Uncomplicated Fluoroscopic-Guided Lumbar   Puncture. This examination was performed and dictated by Hyun Reid PA-C   with indirect supervision and Michelle Warren MD  reviewed and   concurred with the findings. CT Head WO Contrast   Final Result   Unremarkable brain. Maxillary and ethmoid sinusitis. XR CHEST STANDARD (2 VW)   Final Result   No acute cardiopulmonary findings. MRA CHEST W CONTRAST   Final Result   No definite spinal AVM. See above. All labs and imaging reviewed independently today    Assessment:     71-year-old woman with numbness and weakness below T8 on the right side since Thursday. MS vs transverse myelitis. CT head is unremarkable   MRI T spine showed lesion from T6-T8 with possibility of MS, neoplasm or infection   MRA chest showed no AVM; MRI L spine showed disc protrusion only   LP was completed which was unremarkable for infection---other labs pending   Labs unremarkable to far except mildly elevated CRP (1.2-->1. 1)     At present continues to have RLE numbness and spasms of BLE. Reviewed MRI imaging, POC and results thus far. All questions and concerns addressed.       Plan:     Spoke to Dr Antonieta George again today on plan of care  Continue supportive care   Continue Solumedrol 250 q6hrs x5 days-- to be completed 3/22  Pepcid and Oscal 500 for GI protection  Follow specialty LP results-- Oligoclonal bands, Igg index, antiphospholipid, VDRL, Lyme, cardiolipin, ACE, ANCA, etc   Up as tolerated   Continue PT/OT  SCDs  Tobacco cessation     Neuro will continue to follow     As outpt pt should follow closely with outpt neuro---> pt should have MRI T spine repeated in 6 weeks; if LP results are negative this is most likely transverse myelitis x1. MRIs C, T and L spine should be repeated at 1 year.       RAUL Diaz NP-C  2:05 PM  3/18/2020

## 2020-03-18 NOTE — PROGRESS NOTES
Critical Care Hospital Medicine Progress Note      Date of Admission: 3/16/2020  Hospital day # 2    Course: Follow-up on right leg weakness    Subjective    Refers still feeling right leg weakness  Stable overnight. No other overnight issues reported. Exam:    /75   Pulse 83   Temp 97.5 °F (36.4 °C) (Temporal)   Resp 16   Ht 5' 8.5\" (1.74 m) Comment: per previous assessment  Wt 267 lb (121.1 kg)   LMP 03/09/2020   SpO2 97%   BMI 40.01 kg/m²     General appearance: No apparent distress, appears stated age and cooperative. HEENT: Pupils equal, round, and reactive to light. Conjunctivae/corneas clear. Neck: Supple. No jugular venous distention. Trachea midline. Respiratory:  Normal respiratory effort. Clear to auscultation, bilaterally without Rales/Wheezes/Rhonchi. Cardiovascular: S1/S2   Abdomen: Soft, non-tender, non-distended with normal bowel sounds. Musculoskeletal: No clubbing, cyanosis or edema bilaterally. Brisk capillary refill. 2+ lower extremity pulses (dorsalis pedis).    Skin:  No rashes    Neurologic: awake, alert and following commands     Medications:  Reviewed    Infusion Medications   Scheduled Medications    calcium-vitamin D  1 tablet Oral Daily    famotidine  20 mg Oral BID    methylPREDNISolone (SOLU-MEDROL) IVPB  250 mg Intravenous Q6H    sodium chloride flush  10 mL Intravenous 2 times per day    enoxaparin  40 mg Subcutaneous Daily     PRN Meds: sodium chloride flush, acetaminophen **OR** acetaminophen, polyethylene glycol, promethazine **OR** ondansetron, hydrALAZINE    I/O    Intake/Output Summary (Last 24 hours) at 3/18/2020 0918  Last data filed at 3/17/2020 1821  Gross per 24 hour   Intake 300 ml   Output --   Net 300 ml       Labs:   Recent Labs     03/16/20  0216 03/17/20  0636   WBC 12.6* 11.5   HGB 10.1* 10.5*   HCT 33.6* 36.0    209       Recent Labs     03/16/20  0216 03/17/20  0636    137   K 3.9 3.9    102   CO2 23 25   BUN 10 10   CREATININE transcribed using voice recognition software.  Every effort was made to ensure accuracy; however, inadvertent computerized transcription errors may be present.

## 2020-03-19 LAB
ANCA IFA: NORMAL
ANTICARDIOLIPIN IGA ANTIBODY: 6 APL (ref 0–11)
ANTICARDIOLIPIN IGG ANTIBODY: 12 GPL (ref 0–14)
CARDIOLIPIN AB IGM: 20 MPL (ref 0–12)
LYME, EIA: 0.87 LIV (ref 0–1.2)

## 2020-03-19 PROCEDURE — 97530 THERAPEUTIC ACTIVITIES: CPT

## 2020-03-19 PROCEDURE — 1200000000 HC SEMI PRIVATE

## 2020-03-19 PROCEDURE — 84166 PROTEIN E-PHORESIS/URINE/CSF: CPT

## 2020-03-19 PROCEDURE — 6370000000 HC RX 637 (ALT 250 FOR IP): Performed by: NURSE PRACTITIONER

## 2020-03-19 PROCEDURE — 97535 SELF CARE MNGMENT TRAINING: CPT

## 2020-03-19 PROCEDURE — 6360000002 HC RX W HCPCS: Performed by: PSYCHIATRY & NEUROLOGY

## 2020-03-19 PROCEDURE — 2580000003 HC RX 258: Performed by: INTERNAL MEDICINE

## 2020-03-19 PROCEDURE — 6370000000 HC RX 637 (ALT 250 FOR IP): Performed by: INTERNAL MEDICINE

## 2020-03-19 PROCEDURE — 2580000003 HC RX 258: Performed by: PSYCHIATRY & NEUROLOGY

## 2020-03-19 RX ORDER — DIPHENHYDRAMINE HCL 25 MG
25 TABLET ORAL EVERY 6 HOURS PRN
Status: DISCONTINUED | OUTPATIENT
Start: 2020-03-19 | End: 2020-03-22 | Stop reason: HOSPADM

## 2020-03-19 RX ORDER — DIPHENHYDRAMINE HYDROCHLORIDE 50 MG/ML
25 INJECTION INTRAMUSCULAR; INTRAVENOUS EVERY 6 HOURS PRN
Status: DISCONTINUED | OUTPATIENT
Start: 2020-03-19 | End: 2020-03-19

## 2020-03-19 RX ADMIN — DIPHENHYDRAMINE HCL 25 MG: 25 TABLET ORAL at 17:41

## 2020-03-19 RX ADMIN — CALCIUM CARBONATE-VITAMIN D TAB 500 MG-200 UNIT 1 TABLET: 500-200 TAB at 08:30

## 2020-03-19 RX ADMIN — Medication 10 ML: at 08:30

## 2020-03-19 RX ADMIN — SODIUM CHLORIDE 250 MG: 9 INJECTION, SOLUTION INTRAVENOUS at 11:06

## 2020-03-19 RX ADMIN — Medication 10 ML: at 23:28

## 2020-03-19 RX ADMIN — DIPHENHYDRAMINE HCL 25 MG: 25 TABLET ORAL at 23:17

## 2020-03-19 RX ADMIN — SODIUM CHLORIDE 250 MG: 9 INJECTION, SOLUTION INTRAVENOUS at 17:41

## 2020-03-19 RX ADMIN — FAMOTIDINE 20 MG: 20 TABLET ORAL at 08:30

## 2020-03-19 RX ADMIN — FAMOTIDINE 20 MG: 20 TABLET ORAL at 23:17

## 2020-03-19 RX ADMIN — DIPHENHYDRAMINE HCL 25 MG: 25 TABLET ORAL at 11:06

## 2020-03-19 RX ADMIN — SODIUM CHLORIDE 250 MG: 9 INJECTION, SOLUTION INTRAVENOUS at 02:02

## 2020-03-19 RX ADMIN — SODIUM CHLORIDE 250 MG: 9 INJECTION, SOLUTION INTRAVENOUS at 23:17

## 2020-03-19 ASSESSMENT — PAIN SCALES - GENERAL: PAINLEVEL_OUTOF10: 0

## 2020-03-19 NOTE — PROGRESS NOTES
OT BEDSIDE TREATMENT NOTE      Date:3/19/2020  Patient Name: Bryan Mayorga  MRN: [id number]  : 1980  Room: [address] [place] [address]     Per OT Eval:    Evaluating OT: Rody Stewart OTR/L #[id number]     Referring physician: Dianelys Osullivan DO  AM-PAC Daily Activity Raw Score:   Recommended Adaptive Equipment: ww, AE for LE dressing and bathing, shower seat, elevated commode      Diagnosis: complete lesion  T7 R side numbness   Surgery:   Past Surgical History         Past Surgical History:   Procedure Laterality Date    CHOLECYSTECTOMY        TUBAL LIGATION        TYMPANOSTOMY TUBE PLACEMENT                Pertinent Medical History:   Past Medical History        Past Medical History:   Diagnosis Date    Hypertension              Precautions:  Falls, R side sensory loss and weakness     Home Living: Pt lives with  and 3 teenagers  in a 2 story home with 6 step(s) to enter and one rail(s); bed/bath on second floor with full flight of steps and no HR   Bathroom setup: walk in shower    Equipment owned: none  Prior Level of Function:   Independent with ADLs ,  Independent with IADLs; using no AD for ambulation.    Driving: yes                           Medication Management self  Occupation: works at [employer]     Pain Level: Pt did not compalin of pain this session   Cognition: A&O: 3/3; Follows 1-2 step directions increased cues               Memory:  fair               Sequencing:  fair               Problem solving:  good               Judgement/safety:  fair                 Functional Assessment:    Initial Eval Status  Date: 3/16/20 Treatment Status  Date: 3/19/20 Short Term Goals=LTG  Treatment frequency: PRN 2-4 x/week   Feeding Independent   Independent     Grooming SBA standing at sink with ww cue for safety  SBA  Pt stood at sink and washed hands Mod I with ww    UB Dressing SBA Supervision  [facility] gown as jacket sitting EOB Independent   LB Dressing Mod A   SBA  Pt DC, in which pt would benefit from. At end of session, pt sitting EOB, call light within reach. · Pt has made fair progress towards set goals.    · Continue with current plan of care      Treatment Time In: 7:22am           Treatment Time Out: 7:45am               Treatment Charges: Mins Units   Ther Ex  35968     Manual Therapy 90597     Thera Activities 84697 15 1   ADL/Home Mgt 37363 8 1   Neuro Re-ed 28045     Group Therapy      Orthotic manage/training  44243     Non-Billable Time     Total Timed Treatment 23 2        Lorie ERICKSON/L [id number]

## 2020-03-19 NOTE — PROGRESS NOTES
Hospital Medicine Progress Note      Date of Admission: 3/16/2020  Hospital day # 3    Course: Follow-up on right leg weakness    Subjective    Refers no much changes on her right lower extremity weakness  Stable overnight. No other overnight issues reported. Exam:    BP (!) 155/87   Pulse 89   Temp 97.2 °F (36.2 °C) (Temporal)   Resp 16   Ht 5' 8.5\" (1.74 m) Comment: per previous assessment  Wt 267 lb (121.1 kg)   LMP 03/09/2020   SpO2 96%   BMI 40.01 kg/m²     General appearance: No apparent distress, appears stated age and cooperative. HEENT: Pupils equal, round, and reactive to light. Conjunctivae/corneas clear. Neck: Supple. No jugular venous distention. Trachea midline. Respiratory:  Normal respiratory effort. Clear to auscultation, bilaterally without Rales/Wheezes/Rhonchi. Cardiovascular: S1/S2   Abdomen: Soft, non-tender, non-distended with normal bowel sounds. Musculoskeletal: No clubbing, cyanosis or edema bilaterally. Brisk capillary refill. 2+ lower extremity pulses (dorsalis pedis).    Skin:  No rashes    Neurologic: awake, alert and following commands     Medications:  Reviewed    Infusion Medications   Scheduled Medications    calcium-vitamin D  1 tablet Oral Daily    famotidine  20 mg Oral BID    methylPREDNISolone (SOLU-MEDROL) IVPB  250 mg Intravenous Q6H    sodium chloride flush  10 mL Intravenous 2 times per day    enoxaparin  40 mg Subcutaneous Daily     PRN Meds: diphenhydrAMINE, sodium chloride flush, acetaminophen **OR** acetaminophen, polyethylene glycol, promethazine **OR** ondansetron, hydrALAZINE    I/O    Intake/Output Summary (Last 24 hours) at 3/19/2020 0822  Last data filed at 3/18/2020 1837  Gross per 24 hour   Intake 360 ml   Output --   Net 360 ml       Labs:   Recent Labs     03/17/20  0636   WBC 11.5   HGB 10.5*   HCT 36.0          Recent Labs     03/17/20  0636      K 3.9      CO2 25   BUN 10   CREATININE 0.7   CALCIUM 9.0 [] Home with [facility] [] SNF/[facility] [] Acute Rehab [] LTAC []Other    +++++++++++++++++++++++++++++++++++++++++++++++++  Renetta Mckeon MD, Hospitalist  +++++++++++++++++++++++++++++++++++++++++++++++++  NOTE: This report was transcribed using voice recognition software.  Every effort was made to ensure accuracy; however, inadvertent computerized transcription errors may be present.

## 2020-03-19 NOTE — PROGRESS NOTES
Physical Therapy    Facility/Department: [address] NEURO SPINE  Daily Treatment Note  NAME: Rachel Monahan  : 1980  MRN: [id number]     Date of Service: 3/19/2020      Referring Martínez Kitchen DO     Date of Service: 3/19/2020     Evaluating PT: Rober Mendosa PT, DPT PT [id number]     Room #:  8338/5004-W  Diagnosis:  Thoracic Spine Lesion T7  PMHx/PSHx:  HTN  Procedure/Surgery:  None  Precautions:  Spine, RLE Margaret Holding R sided parasthesia, R Knee Buckling  Equipment Needs:  WW    Patient continues to state she wishes to be D/C to her home with family support.      SUBJECTIVE:     Pt lives with spouse and 3 children in a 2 story home with 6 stairs to enter and bilateral rail.  Bed is on 2nd floor and bath is on 2nd floor.  Pt has flight of stairs to 2nd floor with no rails.  Pt ambulated with no device independently PTA. 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   Equipment Owned: None     OBJECTIVE:    Initial Evaluation  Date: 3/16/2020 Treatment Date: 3/19/2020 Short Term/ Long Term   Goals   AM-PAC 6 Clicks 76/75 73/57      Was pt agreeable to Eval/treatment? Yes  Yes      Does pt have pain? 7/10 Low back Reports continual pressure RLE with gait when ambulating.       Bed Mobility  Rolling: SBA  Supine to sit: SBA  Sit to supine: NT  Scooting: SBA  Rolling: Ind  Supine to sit: Ind  Sit to supine: Ind  Scooting: Ind Rolling: Independent     Supine to sit: Independent     Sit to supine: Independent     Scooting: Independent      Transfers Sit to stand: Arleth  Stand to sit: Arleth  Stand pivot: ModA [employer]  Sit to stand: Arleth  Stand to sit: Arleth  Stand pivot: Ar[employer] Sit to stand: Modified Independent  Stand to sit: Modified Independent  Stand pivot: Modified Independent  [employer]   Ambulation    40 feet x Tierney Ropes WW 50' x 2 Arleth WW Slow rate with cues for sequencing.  >150 feet with Modified Independent  [employer]   Stair negotiation: ascended and descended  4 steps with ModA single rail NT this session. >4 steps with no rail to allow access to bed/bath Modified Independent      ROM BUE:  See OT Note  BLE:  WFL       Strength BUE:  See OT Note  RLE: 3-/5 hip flexion/extension  3/5 knee flexion/extension  3/5 DF  2/5 PF   LLE: 4/5 grossly   Improve Strength 1/3 MMT Grade   Balance Sitting EOB:  SBA  Dynamic Standing:  ModA [employer]  Sitting EOB:  SBA  Dynamic Standing:  Arleth [employer] Sitting EOB:  Independent     Dynamic Standing:  Modified Independent  [employer]         Pt is A & O x 4. Noted word finding difficulties. Sensation:  Parasthesia R trunk from T7 Distally to R foot  Proprioception: RLE Deficits  Edema: WNL        Patient education  Pt educated on role of PT.      Patient response to education:   Pt verbalized understanding Pt demonstrated skill Pt requires further education in this area   x x x      ASSESSMENT:     Comments:  Pt received in supine agreeable to PT. Pt performing bed mobility without assistance. Pt performing transfers with Min A due to decreased balance and clonus. Ambulation also completed initially without device then with fww for support on 2nd rep. Pt demonstrating clonus like movement patten when ambulating. Decreased rate of ambulation also noted. Increased fall risk exists. Patient would benefit from continued skilled PT to maximize functional mobility independence. CM notified patient would benefit from intensive rehabilitation.      Treatment:  Patient practiced and was instructed in the following treatment:    · Functional transfer training- Verbal cues for proper positioning and sequencing to perform transfers safely with maximum independence. · Gait training-Verbal cues for proper positioning and sequencing using assistive device to maximize functional mobility independence.        PLAN:     Patient is making good  progress towards established goals. Will continue with current POC.       Time in   1245  Time out  1310     Total Treatment Time  25 minutes      CPT codes:  []?  Gait

## 2020-03-19 NOTE — PROGRESS NOTES
Physical Therapy  Facility/Department: [address] NEURO SPINE  Daily Treatment Note  NAME: Asha Rios  : 1980  MRN: [id number]    Date of Service:  2020     Referring Provider:  Melodie Reynolds DO     Date of Service: 3/18/2020     Evaluating PT:  Popeye Pyle PT, DPT PT [id number]     Room #:  6804/7027-H  Diagnosis:  Thoracic Spine Lesion T7  PMHx/PSHx:  HTN  Procedure/Surgery:  None  Precautions:  Spine, RLE Weakness and R sided parasthesia, R Knee Buckling  Equipment Needs:  [facility]     SUBJECTIVE:     Pt lives with spouse and 3 children in a 2 story home with 6 stairs to enter and bilateral rail. Bed is on 2nd floor and bath is on 2nd floor. Pt has flight of stairs to 2nd floor with no rails. Pt ambulated with no device independently PTA. 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 Equipment Owned: None     OBJECTIVE:    Initial Evaluation  Date: 3/16/2020 Treatment Date: 3/18/2020 Short Term/ Long Term   Goals   AM-PAC 6 Clicks 53/43 64/22      Was pt agreeable to Eval/treatment? Yes  Yes      Does pt have pain? 7/10 Low back  Moderate pressure through RLE with ambulation. Reports of increased temperature in RLE     Bed Mobility  Rolling: SBA  Supine to sit: SBA  Sit to supine: NT  Scooting: SBA  Rolling: SBA  Supine to sit: SBA  Sit to supine: sba   Scooting: SBA Rolling: Independent     Supine to sit:  Independent     Sit to supine: Independent     Scooting: Independent      Transfers Sit to stand: Arleth  Stand to sit: Arleth  Stand pivot: [facility]  Sit to stand: Arleth  Stand to sit: Arleth  Stand pivot: [facility] Sit to stand: Modified Independent  Stand to sit: Modified Independent  Stand pivot: [facility]   Ambulation    40 feet x 2 with [facility]  30  feet x 2 ww min assist  >150 feet with [facility]   Stair negotiation: ascended and descended  4 steps with ModA single rail 4 steps with L HRail with min/mod assist  >4 steps with no rail to allow access to bed/bath Modified Independent      ROM BUE:  See OT Note  BLE:  WFL       Strength BUE:  See OT Note  RLE: 3-/5 hip flexion/extension  3/5 knee flexion/extension  3/5 DF  2/5 PF   LLE: 4/5 grossly   Improve Strength 1/3 MMT Grade   Balance Sitting EOB:  SBA  Dynamic Standing:  ModA [employer]  Sitting EOB:  SBA  Dynamic Standing:  ModA [employer] Sitting EOB:  Independent     Dynamic Standing:  Modified Independent  WW        Pt is A & O x 4. Proprioception: RLE Deficits  Edema:  WNL      Patient education  Pt educated on her risk for a fall and safety      Patient response to education:   Pt verbalized understanding Pt demonstrated skill Pt requires further education in this area   x x x     ASSESSMENT:    Comments:  Pt received in supine agreeable to PT. Pt performing bed mobility without assistance. Pt noticed increase tone right le with getting out of bed. Pt performing transfers with steadying assistance. Pt ambulated with hyperextension right LE during stance phase of gait. Did not notice riight knee buckling with gait today. Pt performed steps with min to mod assist with right le guarded during wbearing  Stance phase of steps  ation. Treatment:  Patient practiced and was instructed in the following treatment:     Functional transfer training- Verbal cues for proper positioning and sequencing to perform transfers safely with maximum independence.  Gait training-Verbal cues for proper positioning and sequencing using assistive device to maximize functional mobility independence.  Steps - cues for technique      PLAN:    Patient is making good  progress towards established goals. Will continue with current POC.       Time in   320  Time out  345     Total Treatment Time  30 minutes     CPT codes:  [] Gait training 08403 0 minutes  [] Manual therapy 25892 0 minutes  [x] Therapeutic activities 23309 30 minutes  [] Therapeutic exercises 96888 0 minutes  [] Neuromuscular reeducation 34943 0 minutes    Lidia Ochoa, PTA  [id number]

## 2020-03-19 NOTE — PLAN OF CARE
Problem: PAIN  Goal: Able to cope with pain  Description: Able to cope with pain     Outcome: Met This Shift     Problem: Falls - Risk of:  Goal: Absence of falls  Outcome: Met This Shift  Goal: Ability to remain free from injury will improve  Description: Ability to remain free from injury will improve     Outcome: Met This Shift     Problem: Pain:  Goal: Pain level will decrease  Description: Pain level will decrease  Outcome: Met This Shift  Goal: Control of acute pain  Description: Control of acute pain  Outcome: Met This Shift  Goal: Control of chronic pain  Description: Control of chronic pain  Outcome: Met This Shift

## 2020-03-19 NOTE — PROGRESS NOTES
Pt is complaining of itching when Solu-medrol IV is given. Pt is requesting Benadryl to be given before administering this medication. We do not have an order for this. Messaged Dr Jese Duval regarding this. Awaiting response.

## 2020-03-20 LAB
Lab: NORMAL
MYELIN BASIC PROTEIN, CSF: 10.75 NG/ML (ref 0–5.5)
REPORT: NORMAL
THIS TEST SENT TO: NORMAL
VDRL CSF SCREEN: NON REACTIVE

## 2020-03-20 PROCEDURE — 6360000002 HC RX W HCPCS: Performed by: PSYCHIATRY & NEUROLOGY

## 2020-03-20 PROCEDURE — 1200000000 HC SEMI PRIVATE

## 2020-03-20 PROCEDURE — 6370000000 HC RX 637 (ALT 250 FOR IP): Performed by: INTERNAL MEDICINE

## 2020-03-20 PROCEDURE — 6370000000 HC RX 637 (ALT 250 FOR IP): Performed by: NURSE PRACTITIONER

## 2020-03-20 PROCEDURE — 2580000003 HC RX 258: Performed by: INTERNAL MEDICINE

## 2020-03-20 PROCEDURE — 97535 SELF CARE MNGMENT TRAINING: CPT

## 2020-03-20 PROCEDURE — 6360000002 HC RX W HCPCS: Performed by: INTERNAL MEDICINE

## 2020-03-20 PROCEDURE — 2580000003 HC RX 258: Performed by: PSYCHIATRY & NEUROLOGY

## 2020-03-20 PROCEDURE — 97530 THERAPEUTIC ACTIVITIES: CPT

## 2020-03-20 RX ADMIN — SODIUM CHLORIDE 250 MG: 9 INJECTION, SOLUTION INTRAVENOUS at 06:15

## 2020-03-20 RX ADMIN — POLYETHYLENE GLYCOL 3350 17 G: 17 POWDER, FOR SOLUTION ORAL at 09:01

## 2020-03-20 RX ADMIN — DIPHENHYDRAMINE HCL 25 MG: 25 TABLET ORAL at 12:35

## 2020-03-20 RX ADMIN — DIPHENHYDRAMINE HCL 25 MG: 25 TABLET ORAL at 06:15

## 2020-03-20 RX ADMIN — Medication 10 ML: at 08:51

## 2020-03-20 RX ADMIN — SODIUM CHLORIDE 250 MG: 9 INJECTION, SOLUTION INTRAVENOUS at 12:35

## 2020-03-20 RX ADMIN — FAMOTIDINE 20 MG: 20 TABLET ORAL at 08:51

## 2020-03-20 RX ADMIN — CALCIUM CARBONATE-VITAMIN D TAB 500 MG-200 UNIT 1 TABLET: 500-200 TAB at 08:51

## 2020-03-20 RX ADMIN — DIPHENHYDRAMINE HCL 25 MG: 25 TABLET ORAL at 18:11

## 2020-03-20 RX ADMIN — ENOXAPARIN SODIUM 40 MG: 40 INJECTION SUBCUTANEOUS at 08:50

## 2020-03-20 RX ADMIN — SODIUM CHLORIDE 250 MG: 9 INJECTION, SOLUTION INTRAVENOUS at 18:11

## 2020-03-20 ASSESSMENT — PAIN SCALES - GENERAL
PAINLEVEL_OUTOF10: 0
PAINLEVEL_OUTOF10: 0

## 2020-03-20 NOTE — PROGRESS NOTES
[]Heparin []PCD [] [address] [facility]  []Encouraged ambulation []N/A    Likely disposition when able:  [x]Home [] Home with [facility] [] SNF/[facility] [] Acute Rehab [] LTAC []Other    +++++++++++++++++++++++++++++++++++++++++++++++++  Valeriy Osullivan MD, Hospitalist  +++++++++++++++++++++++++++++++++++++++++++++++++  NOTE: This report was transcribed using voice recognition software.  Every effort was made to ensure accuracy; however, inadvertent computerized transcription errors may be present.

## 2020-03-20 NOTE — PROGRESS NOTES
in the chair, call light within reach and tray table in front of her. · Pt has made good progress towards set goals.    · Continue with current plan of care      Treatment Time In: 8008          Treatment Time Out: 1500               Treatment Charges: Mins Units   Ther Ex  66031     Manual Therapy [id number]     Thera Activities 66039     ADL/Home Mgt 71399 15 1   Neuro Re-ed 53682     Group Therapy      Orthotic manage/training  67632     Non-Billable Time     Total Timed Treatment 15 [address] Echols [address], [address]

## 2020-03-20 NOTE — PROGRESS NOTES
1930 Assumed patient resting in bed with no complaints of pain or acute respiratory distress at this time. IV capped. RA. Bed locked in lowest position. Safety discussed and patient verbalizes understanding. Call bell and belongings within reach. Will continue to monitor. 1945 24 hr urine complete at this time to be delivered to LAB as ordered.

## 2020-03-20 NOTE — PROGRESS NOTES
Physical Therapy    Facility/Department: [address] NEURO SPINE  Daily Treatment Note  NAME: Gail Sanabria  : 1980  MRN: [id number]     Date of Service: 3/19/2020      Referring Maty Swift DO     Date of Service: 3/19/2020     Evaluating PT: Carmelita Casiano PT, DPT PT [id number]     Room #:  7610/1325-C  Diagnosis:  Thoracic Spine Lesion T7  PMHx/PSHx:  HTN  Procedure/Surgery:  None  Precautions:  Spine, RLE Yadira Manchester R sided parasthesia, R Knee Buckling  Equipment Needs:  WW    Patient continues to state she wishes to be D/C to her home with family support.      SUBJECTIVE:     Pt lives with spouse and 3 children in a 2 story home with 6 stairs to enter and bilateral rail.  Bed is on 2nd floor and bath is on 2nd floor.  Pt has flight of stairs to 2nd floor with no rails.  Pt ambulated with no device independently PTA. 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   Equipment Owned: None     OBJECTIVE:    Initial Evaluation  Date: 3/16/2020 Treatment Date: 3/20/2020 Short Term/ Long Term   Goals   AM-PAC 6 Clicks 71/73 67/97      Was pt agreeable to Eval/treatment? Yes  Yes      Does pt have pain? 7/10 Low back Reports continual pressure RLE with gait when ambulating.       Bed Mobility  Rolling: SBA  Supine to sit: SBA  Sit to supine: NT  Scooting: SBA NT Rolling: Independent     Supine to sit: Independent     Sit to supine: Independent     Scooting: Independent      Transfers Sit to stand: Arleth  Stand to sit: Arleth  Stand pivot: ModA [employer]  Sit to stand: Arleth  Stand to sit: Arleth  Stand pivot: Ar[employer]th [employer] Sit to stand: Modified Independent  Stand to sit: Modified Independent  Stand pivot: Modified Independent  [employer]   Ambulation    40 feet x 2 with Arleth [employer] 50ft with ww cga  >150 feet with Modified Independent  [employer]   Stair negotiation: ascended and descended  4 steps with ModA single rail NT this session.   >4 steps with no rail to allow access to bed/bath Modified Independent      ROM BUE:  See OT Note  BLE:  WFL       Strength BUE:  See OT Note  RLE: 3-/5 hip flexion/extension  3/5 knee flexion/extension  3/5 DF  2/5 PF   LLE: 4/5 grossly   Improve Strength 1/3 MMT Grade   Balance Sitting EOB:  SBA  Dynamic Standing:  ModA [employer]  Sitting EOB:  SBA  Dynamic Standing:  Arle[employer] Sitting EOB:  Independent     Dynamic Standing:  Modified Independent  [employer]        Patient education  Pt educated on importance of therapy    Patient response to education:   Pt verbalized understanding Pt demonstrated skill Pt requires further education in this area   x x x     ASSESSMENT:    Comments:  Pt sitting in chair upon entering room. Pt left in chair at end of session. Treatment:  Patient practiced and was instructed in the following treatment:     Therapeutic Activity: Pt educated on spinal precautions when donning socks in chair. Pt demo steady gait however R LE spasms noted after 25ft of amb;no LOB or buckling occurred. Pt stated she amb without ww from room to nurses station without issues. Returned pt to chair at end of session. Educated pt on multiple exercises to perform in bed and in chair. PLAN:    Patient is making good progress towards established goals. Will continue with current POC.       Time in  1445  Time out  1455    Total Treatment Time  10 minutes     CPT codes:  [] Gait training 83030  minutes  [] Manual therapy 03551 minutes  [x] Therapeutic activities 39595 10 minutes  [] Therapeutic exercises 20830 0 minutes  [] Neuromuscular reeducation 90849 minutes    Klarissa Pike PTA [id number]

## 2020-03-21 LAB
ALBUMIN CSF: 14 MG/DL (ref 0–35)
ALBUMIN INDEX: 3.8 RATIO (ref 0–9)
ALBUMIN, SERUM BY NEPHELOMETRY: 3660 MG/DL (ref 3500–5200)
ANION GAP SERPL CALCULATED.3IONS-SCNC: 14 MMOL/L (ref 7–16)
BLOOD CULTURE, ROUTINE: NORMAL
BUN BLDV-MCNC: 15 MG/DL (ref 6–20)
CALCIUM SERPL-MCNC: 9.3 MG/DL (ref 8.6–10.2)
CHLORIDE BLD-SCNC: 102 MMOL/L (ref 98–107)
CO2: 25 MMOL/L (ref 22–29)
CREAT SERPL-MCNC: 0.6 MG/DL (ref 0.5–1)
CULTURE, BLOOD 2: NORMAL
GFR AFRICAN AMERICAN: >60
GFR NON-AFRICAN AMERICAN: >60 ML/MIN/1.73
GLUCOSE BLD-MCNC: 127 MG/DL (ref 74–99)
IGG CSF: 3.2 MG/DL (ref 0–6)
IGG INDEX: 0.67 RATIO (ref 0.28–0.66)
IGG SYNTHESIS RATE CSF: 0.7 MG/D
IGG/ALBUMIN CSF: 0.23 RATIO (ref 0.09–0.25)
IGG: 1250 MG/DL (ref 768–1632)
MAGNESIUM: 2.4 MG/DL (ref 1.6–2.6)
POTASSIUM SERPL-SCNC: 3.8 MMOL/L (ref 3.5–5)
SODIUM BLD-SCNC: 141 MMOL/L (ref 132–146)

## 2020-03-21 PROCEDURE — 6370000000 HC RX 637 (ALT 250 FOR IP): Performed by: NURSE PRACTITIONER

## 2020-03-21 PROCEDURE — 6370000000 HC RX 637 (ALT 250 FOR IP): Performed by: INTERNAL MEDICINE

## 2020-03-21 PROCEDURE — 2580000003 HC RX 258: Performed by: INTERNAL MEDICINE

## 2020-03-21 PROCEDURE — 83735 ASSAY OF MAGNESIUM: CPT

## 2020-03-21 PROCEDURE — 99232 SBSQ HOSP IP/OBS MODERATE 35: CPT | Performed by: NURSE PRACTITIONER

## 2020-03-21 PROCEDURE — 2580000003 HC RX 258: Performed by: PSYCHIATRY & NEUROLOGY

## 2020-03-21 PROCEDURE — 6360000002 HC RX W HCPCS: Performed by: INTERNAL MEDICINE

## 2020-03-21 PROCEDURE — 80048 BASIC METABOLIC PNL TOTAL CA: CPT

## 2020-03-21 PROCEDURE — 6360000002 HC RX W HCPCS: Performed by: PSYCHIATRY & NEUROLOGY

## 2020-03-21 PROCEDURE — 1200000000 HC SEMI PRIVATE

## 2020-03-21 PROCEDURE — 36415 COLL VENOUS BLD VENIPUNCTURE: CPT

## 2020-03-21 RX ORDER — BACLOFEN 10 MG/1
10 TABLET ORAL 3 TIMES DAILY
Status: DISCONTINUED | OUTPATIENT
Start: 2020-03-21 | End: 2020-03-22 | Stop reason: HOSPADM

## 2020-03-21 RX ADMIN — BACLOFEN 10 MG: 10 TABLET ORAL at 16:58

## 2020-03-21 RX ADMIN — DIPHENHYDRAMINE HCL 25 MG: 25 TABLET ORAL at 11:59

## 2020-03-21 RX ADMIN — SODIUM CHLORIDE, PRESERVATIVE FREE 10 ML: 5 INJECTION INTRAVENOUS at 17:44

## 2020-03-21 RX ADMIN — BACLOFEN 10 MG: 10 TABLET ORAL at 12:00

## 2020-03-21 RX ADMIN — BACLOFEN 10 MG: 10 TABLET ORAL at 21:15

## 2020-03-21 RX ADMIN — SODIUM CHLORIDE 250 MG: 9 INJECTION, SOLUTION INTRAVENOUS at 06:30

## 2020-03-21 RX ADMIN — DIPHENHYDRAMINE HCL 25 MG: 25 TABLET ORAL at 22:42

## 2020-03-21 RX ADMIN — Medication 10 ML: at 10:09

## 2020-03-21 RX ADMIN — Medication 10 ML: at 21:15

## 2020-03-21 RX ADMIN — POLYETHYLENE GLYCOL 3350 17 G: 17 POWDER, FOR SOLUTION ORAL at 10:10

## 2020-03-21 RX ADMIN — SODIUM CHLORIDE 250 MG: 9 INJECTION, SOLUTION INTRAVENOUS at 22:42

## 2020-03-21 RX ADMIN — SODIUM CHLORIDE 250 MG: 9 INJECTION, SOLUTION INTRAVENOUS at 11:25

## 2020-03-21 RX ADMIN — DIPHENHYDRAMINE HCL 25 MG: 25 TABLET ORAL at 06:30

## 2020-03-21 RX ADMIN — Medication 10 ML: at 00:15

## 2020-03-21 RX ADMIN — CALCIUM CARBONATE-VITAMIN D TAB 500 MG-200 UNIT 1 TABLET: 500-200 TAB at 10:10

## 2020-03-21 RX ADMIN — ENOXAPARIN SODIUM 40 MG: 40 INJECTION SUBCUTANEOUS at 10:09

## 2020-03-21 RX ADMIN — DIPHENHYDRAMINE HCL 25 MG: 25 TABLET ORAL at 00:10

## 2020-03-21 RX ADMIN — DIPHENHYDRAMINE HCL 25 MG: 25 TABLET ORAL at 17:44

## 2020-03-21 RX ADMIN — SODIUM CHLORIDE 250 MG: 9 INJECTION, SOLUTION INTRAVENOUS at 00:10

## 2020-03-21 RX ADMIN — FAMOTIDINE 20 MG: 20 TABLET ORAL at 10:10

## 2020-03-21 RX ADMIN — FAMOTIDINE 20 MG: 20 TABLET ORAL at 21:15

## 2020-03-21 RX ADMIN — SODIUM CHLORIDE 250 MG: 9 INJECTION, SOLUTION INTRAVENOUS at 17:44

## 2020-03-21 RX ADMIN — FAMOTIDINE 20 MG: 20 TABLET ORAL at 00:09

## 2020-03-21 ASSESSMENT — PAIN SCALES - GENERAL
PAINLEVEL_OUTOF10: 0

## 2020-03-21 NOTE — PROGRESS NOTES
Hospital Medicine Progress Note      Date of Admission: 3/16/2020  Hospital day # 5    Course: Follow-up on right leg weakness    Subjective    Patient refers weakness in the right lower extremity with not much improvement so far. Discussed with nursing charge, no events overnight    Exam:    BP (!) 145/74   Pulse 65   Temp 97 °F (36.1 °C) (Temporal)   Resp 16   Ht 5' 8.5\" (1.74 m) Comment: per previous assessment  Wt 267 lb (121.1 kg)   LMP 03/09/2020   SpO2 96%   BMI 40.01 kg/m²     General appearance: No apparent distress, appears stated age and cooperative. HEENT: Pupils equal, round, and reactive to light. Conjunctivae/corneas clear. Neck: Supple. No jugular venous distention. Trachea midline. Respiratory:  Normal respiratory effort. Clear to auscultation, bilaterally without Rales/Wheezes/Rhonchi. Cardiovascular: S1/S2   Abdomen: Soft, non-tender, non-distended with normal bowel sounds. Musculoskeletal: No clubbing, cyanosis or edema bilaterally. Brisk capillary refill. 2+ lower extremity pulses (dorsalis pedis). Skin:  No rashes    Neurologic: awake, alert and following commands     Medications:  Reviewed    Infusion Medications   Scheduled Medications    calcium-vitamin D  1 tablet Oral Daily    famotidine  20 mg Oral BID    methylPREDNISolone (SOLU-MEDROL) IVPB  250 mg Intravenous Q6H    sodium chloride flush  10 mL Intravenous 2 times per day    enoxaparin  40 mg Subcutaneous Daily     PRN Meds: diphenhydrAMINE, sodium chloride flush, acetaminophen **OR** acetaminophen, polyethylene glycol, promethazine **OR** ondansetron, hydrALAZINE    I/O    Intake/Output Summary (Last 24 hours) at 3/21/2020 0801  Last data filed at 3/20/2020 1853  Gross per 24 hour   Intake 240 ml   Output --   Net 240 ml       Labs:   No results for input(s): WBC, HGB, HCT, PLT in the last 72 hours. No results for input(s): NA, K, CL, CO2, BUN, CREATININE, CALCIUM, PHOS in the last 72 hours.     Invalid input(s): NANCYES    No results for input(s): PROT, ALB, ALKPHOS, ALT, AST, BILITOT, AMYLASE, LIPASE in the last 72 hours. No results for input(s): INR in the last 72 hours. No results for input(s): [place] in the last 72 hours. Other labs:  Lab Results   Component Value Date    TSH 2.430 02/08/2018    INR 1.1 03/17/2020       Radiology:  Imaging studies reviewed today. ASSESSMENT:     Complete lesion at unspecified level of thoracic spinal cord, initial encounter [facility]) [S24.119A] 03/16/2020     Paresthesias involving right side of the body with level lesion at T6. Thoracic spine lesion T7. Hypertension  Tobacco use, continuous. Poss MS vs transverse Myelitis        PLAN:       Continue Solu-Medrol to 50 mg IV every 6 hours to complete 5 days  Head CT showed no evidence of acute findings  LP 3/17/20  CT head is unremarkable  MRI T spine showed lesion from T6-T8 with possibility of MS, neoplasm or infection  MRA chest showed no AVM; MRI L spine showed disc protrusion only  LP was completed which was unremarkable for infection---other labs pending  Labs unremarkable to far except mildly elevated CRP (1.2-->1. 1)   CXR no acute findings  Anticipated solumedrol 250mg IV Q6hrs for 5 days followed by Prednisone taper (60 mg daily for 3 days, 40 mg daily for 3 days then 20 mg daily for 3 days)  PPIs  ISS  Monitor Autonomic Function, possible underlying Dysautonomia. Monitor BP, Keep SBP<180, Labetalol/Hydralazine PRN  Heparin SQ  Continue current management  Neurology following  PT/OT    Patient refers would like to go home at time of discharge. Patient will require home care services. Patient does not want acute rehab.     Diet: DIET GENERAL;  Code Status: Full Code    PT/OT Eval Status: [x] Ordered [] Evaluation noted [] Not applicable    DVT Prophylaxis: [x]Lovenox []Heparin []PCD [] [address] []Encouraged ambulation []N/A    Likely disposition when able:  [x]Home [] Home with [facility] [] SNF/[facility]

## 2020-03-21 NOTE — PROGRESS NOTES
0.87 normal   ANCA IFA <1.20 normal   cardiolipin 12 nomal  ACE 38 normal   VDRL non-reactive  RPR non-reactive  SPIKE negative   Vitamin B12 488 normal  ESR 11--> 17 normal   Lactic acid 1.7 normal   Hepatitis panel non-reactive  Homocysteine 7.0 normal   HIV non-reactive  RF 10 normal   CRP 1.2 elevated     Ref. Range 3/17/2020 10:30   Appearance, CSF Latest Ref Range: Clear  Clear   Glucose, CSF Latest Ref Range: 40 - 70 mg/dL 64   Protein, CSF Latest Ref Range: 15 - 40 mg/dL 25   RBC, CSF Latest Units: /uL <2000   WBC, CSF Latest Ref Range: 0 - 2 /uL <3   Neutrophils, CSF Latest Ref Range: 0 - 10 % 0   Monocytes, CSF Latest Ref Range: 10 - 70 % 100 (H)   Color, CSF Unknown Colorless   Tube Number + CELL CT + DIFF-CSF Unknown Tube 2       FL LUMBAR PUNCTURE DIAG   Final Result   Successful Uncomplicated Fluoroscopic-Guided Lumbar   Puncture. This examination was performed and dictated by Dionna Tate PA-C   with indirect supervision and Kiana Acuña MD  reviewed and   concurred with the findings. CT Head WO Contrast   Final Result   Unremarkable brain. Maxillary and ethmoid sinusitis. XR CHEST STANDARD (2 VW)   Final Result   No acute cardiopulmonary findings. MRA CHEST W CONTRAST   Final Result   No definite spinal AVM. See above. All labs and imaging reviewed independently today    Assessment:     56-year-old woman with numbness and weakness below T8 on the right side since Thursday. MS vs transverse myelitis. CT head is unremarkable   MRI T spine showed lesion from T6-T8 with possibility of MS, neoplasm or infection   MRA chest showed no AVM; MRI L spine showed disc protrusion only   LP was completed which was unremarkable for infection---other labs pending   Labs unremarkable to far except mildly elevated CRP (1.2-->1. 1)     At present continues to have RLE numbness and spasms of BLE. Reviewed MRI imaging, POC and results thus far.   All questions and

## 2020-03-21 NOTE — PROGRESS NOTES
1915 Assumed patient care at this time . Patient resting in chair with no complaints of pain or respiratory distress at this time. IV capped at this time. RA. Bed locked in lowest position. Safety discussed and patient verbalizes understanding. Call bell and belongings within reach.  Will continue to monitor

## 2020-03-22 VITALS
BODY MASS INDEX: 39.55 KG/M2 | HEIGHT: 69 IN | DIASTOLIC BLOOD PRESSURE: 83 MMHG | WEIGHT: 267 LBS | TEMPERATURE: 97.3 F | SYSTOLIC BLOOD PRESSURE: 138 MMHG | RESPIRATION RATE: 16 BRPM | HEART RATE: 70 BPM | OXYGEN SATURATION: 96 %

## 2020-03-22 PROCEDURE — 99232 SBSQ HOSP IP/OBS MODERATE 35: CPT | Performed by: NURSE PRACTITIONER

## 2020-03-22 PROCEDURE — 6370000000 HC RX 637 (ALT 250 FOR IP): Performed by: INTERNAL MEDICINE

## 2020-03-22 PROCEDURE — 6370000000 HC RX 637 (ALT 250 FOR IP): Performed by: NURSE PRACTITIONER

## 2020-03-22 PROCEDURE — 6360000002 HC RX W HCPCS: Performed by: PSYCHIATRY & NEUROLOGY

## 2020-03-22 PROCEDURE — 2580000003 HC RX 258: Performed by: INTERNAL MEDICINE

## 2020-03-22 PROCEDURE — 2580000003 HC RX 258: Performed by: PSYCHIATRY & NEUROLOGY

## 2020-03-22 PROCEDURE — 6360000002 HC RX W HCPCS: Performed by: INTERNAL MEDICINE

## 2020-03-22 RX ORDER — PREDNISONE 10 MG/1
TABLET ORAL
Qty: 39 TABLET | Refills: 0 | Status: SHIPPED | OUTPATIENT
Start: 2020-03-22 | End: 2020-03-22 | Stop reason: SDUPTHER

## 2020-03-22 RX ORDER — BACLOFEN 10 MG/1
10 TABLET ORAL 3 TIMES DAILY PRN
Qty: 20 TABLET | Refills: 0 | Status: SHIPPED | OUTPATIENT
Start: 2020-03-22 | End: 2020-09-16 | Stop reason: ALTCHOICE

## 2020-03-22 RX ORDER — PREDNISONE 10 MG/1
TABLET ORAL
Qty: 39 TABLET | Refills: 0 | Status: SHIPPED | OUTPATIENT
Start: 2020-03-22 | End: 2020-04-03

## 2020-03-22 RX ORDER — BACLOFEN 10 MG/1
10 TABLET ORAL 3 TIMES DAILY PRN
Qty: 20 TABLET | Refills: 0 | Status: SHIPPED | OUTPATIENT
Start: 2020-03-22 | End: 2020-03-22

## 2020-03-22 RX ADMIN — ENOXAPARIN SODIUM 40 MG: 40 INJECTION SUBCUTANEOUS at 11:55

## 2020-03-22 RX ADMIN — BACLOFEN 10 MG: 10 TABLET ORAL at 16:02

## 2020-03-22 RX ADMIN — FAMOTIDINE 20 MG: 20 TABLET ORAL at 11:56

## 2020-03-22 RX ADMIN — CALCIUM CARBONATE-VITAMIN D TAB 500 MG-200 UNIT 1 TABLET: 500-200 TAB at 11:56

## 2020-03-22 RX ADMIN — SODIUM CHLORIDE 250 MG: 9 INJECTION, SOLUTION INTRAVENOUS at 17:37

## 2020-03-22 RX ADMIN — DIPHENHYDRAMINE HCL 25 MG: 25 TABLET ORAL at 11:56

## 2020-03-22 RX ADMIN — DIPHENHYDRAMINE HCL 25 MG: 25 TABLET ORAL at 05:41

## 2020-03-22 RX ADMIN — DIPHENHYDRAMINE HCL 25 MG: 25 TABLET ORAL at 17:37

## 2020-03-22 RX ADMIN — Medication 10 ML: at 11:56

## 2020-03-22 RX ADMIN — SODIUM CHLORIDE 250 MG: 9 INJECTION, SOLUTION INTRAVENOUS at 11:55

## 2020-03-22 RX ADMIN — SODIUM CHLORIDE 250 MG: 9 INJECTION, SOLUTION INTRAVENOUS at 05:41

## 2020-03-22 ASSESSMENT — PAIN SCALES - GENERAL
PAINLEVEL_OUTOF10: 0
PAINLEVEL_OUTOF10: 0

## 2020-03-22 NOTE — PROGRESS NOTES
hearing Normal   IX: soft palate elevation  Normal   IX,X: gag reflex    XI: trapezius strength  5/5   XI: sternocleidomastoid strength 5/5   XI: neck extension strength  5/5   XII: tongue strength  Normal     Motor:  5/5 throughout except 4/5 in proximal and distal right lower extremity  Normal bulk and tone  No abnormal movements    Sensory:  LT, PP and proprioception decreased groin and BLE--- R side     Coordination:   FN, FFM and NI intact b/l  HKS intact to L    Gait:  Not tested for pt's safety    DTR:   Right Brachioradialis reflex 2+  Left Brachioradialis reflex 2+  Right Biceps reflex 2+  Left Biceps reflex 2+  Right Triceps reflex 2+  Left Triceps reflex 2+  Right Quadriceps reflex 0  Left Quadriceps reflex 1+  Right Achilles reflex 0  Left Achilles reflex 1+    NR R, Down to L--- Babinskis  No Mcfarlane's    No pathological reflexes    Laboratory/Radiology:     CBC:   Lab Results   Component Value Date    WBC 11.5 03/17/2020    RBC 4.43 03/17/2020    HGB 10.5 03/17/2020    HCT 36.0 03/17/2020    MCV 81.3 03/17/2020    MCH 23.7 03/17/2020    MCHC 29.2 03/17/2020    RDW 16.8 03/17/2020     03/17/2020    MPV 12.5 03/17/2020     CMP:    Lab Results   Component Value Date     03/21/2020    K 3.8 03/21/2020    K 3.9 03/17/2020     03/21/2020    CO2 25 03/21/2020    BUN 15 03/21/2020    CREATININE 0.6 03/21/2020    GFRAA >60 03/21/2020    LABGLOM >60 03/21/2020    GLUCOSE 127 03/21/2020    PROT 6.7 03/16/2020    LABALBU 3.1 03/16/2020    CALCIUM 9.3 03/21/2020   U/A:    Lab Results   Component Value Date    COLORU Yellow 03/15/2020    PROTEINU Negative 03/15/2020    PHUR 6.0 03/15/2020    WBCUA 0-1 03/15/2020    RBCUA 1-3 03/15/2020    BACTERIA RARE 03/15/2020    CLARITYU Clear 03/15/2020    SPECGRAV >=1.030 03/15/2020    LEUKOCYTESUR Negative 03/15/2020    UROBILINOGEN 0.2 03/15/2020    BILIRUBINUR Negative 03/15/2020    BLOODU TRACE-INTACT 03/15/2020    GLUCOSEU Negative 03/15/2020     Lab Results   Component Value Date    TSH 2.430 02/08/2018     Lyme 0.87 normal   ANCA IFA <1.20 normal   cardiolipin 12 nomal  ACE 38 normal   VDRL non-reactive  RPR non-reactive  SPIKE negative   Vitamin B12 488 normal  ESR 11--> 17 normal   Lactic acid 1.7 normal   Hepatitis panel non-reactive  Homocysteine 7.0 normal   HIV non-reactive  RF 10 normal   CRP 1.2 elevated     Ref. Range 3/17/2020 10:30   Appearance, CSF Latest Ref Range: Clear  Clear   Glucose, CSF Latest Ref Range: 40 - 70 mg/dL 64   Protein, CSF Latest Ref Range: 15 - 40 mg/dL 25   RBC, CSF Latest Units: /uL <2000   WBC, CSF Latest Ref Range: 0 - 2 /uL <3   Neutrophils, CSF Latest Ref Range: 0 - 10 % 0   Monocytes, CSF Latest Ref Range: 10 - 70 % 100 (H)   Color, CSF Unknown Colorless   Tube Number + CELL CT + DIFF-CSF Unknown Tube 2       FL LUMBAR PUNCTURE DIAG   Final Result   Successful Uncomplicated Fluoroscopic-Guided Lumbar   Puncture. This examination was performed and dictated by Caitlyn Sanders PA-C   with indirect supervision and Jt Sepulveda MD  reviewed and   concurred with the findings. CT Head WO Contrast   Final Result   Unremarkable brain. Maxillary and ethmoid sinusitis. XR CHEST STANDARD (2 VW)   Final Result   No acute cardiopulmonary findings. MRA CHEST W CONTRAST   Final Result   No definite spinal AVM. See above. All labs and imaging reviewed independently today    Assessment:     61-year-old woman with numbness and weakness below T8 on the right side since Thursday. MS vs transverse myelitis. CT head is unremarkable   MRI T spine showed lesion from T6-T8 with possibility of MS, neoplasm or infection   MRA chest showed no AVM; MRI L spine showed disc protrusion only   LP was completed which was unremarkable for infection---other labs pending   Labs unremarkable to far except mildly elevated CRP (1.2-->1. 1)     At present continues to have RLE numbness and spasms of BLE.     Spasms of bilateral lower extremities have improved with baclofen    Reviewed MRI imaging, POC and results thus far. All questions and concerns addressed. Plan:     Continue supportive care   Continue low dose baclofen TID for spasms   Continue Solumedrol -- to be completed later today  Pepcid and Oscal 500 for GI protection  Follow specialty LP results-- Oligoclonal bands, Igg index, antiphospholipid, aquaporin, NMO, etc   Up as tolerated   Continue PT/OT  SCDs  Tobacco cessation  Information provided to patient yesterday    As outpt pt should follow closely with outpt neuro---> pt should have MRI T spine repeated in 6 weeks; if LP results are negative this is most likely transverse myelitis x1. MRIs C, T and L spine should be repeated at 1 year. Neurology will sign off. Okay to discharge later today from neuro standpoint once medically cleared after last dose of Solu-Medrol. Patient will need to follow outpatient with neurology in 1 month.         [id number], APRN NP-C  11:43 AM  3/22/2020

## 2020-03-22 NOTE — DISCHARGE INSTR - COC
Continuity of Care Form    Patient Name: Henrry Jean-Baptiste   :  1980  MRN:  [id number]    Admit date:  3/16/2020  Discharge date:  ***    Code Status Order: Full Code   Advance Directives:   Advance Care Flowsheet Documentation     Date/Time Healthcare Directive Type of Healthcare Directive Copy in [address] Agent's Name Healthcare Agent's Phone Number    [phone]  No, patient does not have an advance directive for healthcare treatment -- -- -- -- --          Admitting Physician:  Cat Small MD  PCP: RUAL Carrion CNP    Discharging Nurse: [facility] Unit/Room#: 0003/2196-F  Discharging Unit Phone Number: ***    Emergency Contact:   Extended Emergency Contact Information  Primary Emergency Contact: Gonzalez Escobar  Address: [facility], [address] [place] [address] Phone: [phone]  Mobile Phone: [phone]  Relation: Spouse   needed? No    Past Surgical History:  Past Surgical History:   Procedure Laterality Date    CHOLECYSTECTOMY      TUBAL LIGATION      TYMPANOSTOMY TUBE PLACEMENT         Immunization History: There is no immunization history on file for this patient.     Active Problems:  Patient Active Problem List   Diagnosis Code    Complete lesion at unspecified level of thoracic spinal cord, initial encounter ([facility]) S24.119A    Numbness and tingling of right lower extremity R20.0, R20.2    Abnormal MRI, spinal cord R90.89       Isolation/Infection:   Isolation          No Isolation        Patient Infection Status     None to display          Nurse Assessment:  Last Vital Signs: /83   Pulse 70   Temp 97.3 °F (36.3 °C) (Temporal)   Resp 16   Ht 5' 8.5\" (1.74 m) Comment: per previous assessment  Wt 267 lb (121.1 kg)   LMP 2020   SpO2 96%   BMI 40.01 kg/m²     Last documented pain score (0-10 scale): Pain Level: 0  Last Weight:   Wt Readings from Last 1 Encounters:   20 267 lb (121.1 kg)     Mental Status:  {IP PT MENTAL STATUS:86286}    IV Access:  { ALMA ROSA IV ACCESS:346936221}    Nursing Mobility/ADLs:  Walking   {CHP DME ARSO:729078308}  Transfer  {CHP DME CFRY:502311082}  Bathing  {CHP DME QXYA:290291115}  Dressing  {CHP DME WGRJ:730473265}  Toileting  {CHP DME LRUA:053351010}  Feeding  {CHP DME HVPT:469953123}  Med Admin  {CHP DME FZZK:749585961}  Med Delivery   { ALMA ROSA MED Delivery:525633790}    Wound Care Documentation and Therapy:        Elimination:  Continence:   · Bowel: {YES / WF:22522}  · Bladder: {YES / NS:40972}  Urinary Catheter: {Urinary Catheter:665600669}   Colostomy/Ileostomy/Ileal Conduit: {YES / VN:61782}       Date of Last BM: ***  No intake or output data in the 24 hours ending 20 1747  I/O last 3 completed shifts:   In: 600 [P.O.:600]  Out: -     Safety Concerns:     [facility] Safety Concerns:211407878}    Impairments/Disabilities:      [facility] Impairments/Disabilities:580203801}    Nutrition Therapy:  Current Nutrition Therapy:   [facility] Diet List:138135771}    Routes of Feeding: {P DME Other Feedings:025716110}  Liquids: {Slp liquid thickness:50633}  Daily Fluid Restriction: {CHP DME Yes amt example:389740073}  Last Modified Barium Swallow with Video (Video Swallowing Test): {Done Not Done GGI}    Treatments at the Time of Hospital Discharge:   Respiratory Treatments: ***  Oxygen Therapy:  {Therapy; copd oxygen:62257}  Ventilator:    {[facility] Vent OMVP:887957331}    Rehab Therapies: {THERAPEUTIC INTERVENTION:4667238792}  Weight Bearing Status/Restrictions: [employer] Weight Bearin}  Other Medical Equipment (for information only, NOT a DME order):  {EQUIPMENT:493309680}  Other Treatments: ***    Patient's personal belongings (please select all that are sent with patient):  {SHAILESH DME Belongings:710855548}    RN SIGNATURE:  {Esignature:043952546}    CASE MANAGEMENT/SOCIAL WORK SECTION    Inpatient Status Date:

## 2020-03-23 LAB
ADDENDUM ELECTROPHORESIS URINE RANDOM: NORMAL
Lab: NORMAL
Lab: NORMAL
REPORT: NORMAL
REPORT: NORMAL
THIS TEST SENT TO: NORMAL
THIS TEST SENT TO: NORMAL

## 2020-03-24 LAB
ANTIPHOSPHOLIPID AB IGG: 19 GPL (ref 0–14)
ANTIPHOSPHOLIPID AB IGM: 27 MPL (ref 0–14)
Lab: NORMAL
REPORT: NORMAL
THIS TEST SENT TO: NORMAL

## 2020-03-29 NOTE — CONSULTS
Chart reviewed and case discussed with rehab admissions coordinator. Please see her note for details. Thank you.      Christopher Horowitz MD
BMI 40.01 kg/m²     Is comfortable. Well-nourished. No rash. Not in respiratory distress. Normal symmetric pulses  AAOx3, follows commands, normal attention and concentration. No aphasia/dysarthria. Normal fund of knowledge. Normal recent and remote memory. CN 2,3,4,6:  PERRL, EOMI, VFF, normal saccades and pursuit, no gaze preference/nystagmus. Funduscopy: Normal fundi  CN 5: Intact facial sensation,   CN 7: no asymmetry. CN 8: Intact hearing bilaterally. CN 9,10: Symmetric palate elevation. CN 11: Neck muscles and shoulder shrug 5/5. CN: 12: Tongue midline. Sensation: Sensory level around T8 with involvement of light touch,PP and proprioception affecting the right side  Motor: Normal bulk and tone, 4/5 in proximal and distal right lower extremity. No adventitious movements  DTRs: +2 biceps/triceps/BR/ +1 in left and 0 in right knees, +1 in left and 0 in right ankles, plantar is down on the left side and mute on the right side  Coordination: intact F-N and H-S B/L. No dysmetria. Intact NI.  Gait: Hemiparetic      I independently reviewed the labs and imaging studies. ASSESSMENT:  44-year-old woman with 3 days of numbness and weakness below T8 on the right side. MRI showed lesion less than 2 vertebral in the thoracic spine.   Differential diagnosis includes inflammation (transverse myelitis versus MS versus Devic's syndrome), neoplasm (unlikely due to lack of heterogeneity in enhancement and edema, her age and gender) and spinal AV malformation versus aVF         PLAN:   -MRI brain w/wo can be done as outpatient  -MRA spinal arteries  -CBC, Coags, RFP, Mg, B12, Folate, LFT, TSH, HgbA1C, UA, Tox screen, ESR, CRP, SPIKE, ANCA, RF, Antiphospholipid Ab, RPR, HbA1C, Vit D, CK, MMA, Homocysteine, Hepatitis Panel, HIV Ab, UPEP/SPEP  -LP: measure OP/CP, send for cell count/differential, Gluc, Protein, Myelin Basi Protein   -Serum & CSF (albumin, OGBs, IgG, +/- Lyme Ab, ACE)  -Serum and CSF

## 2020-04-15 NOTE — DISCHARGE SUMMARY
Negative 03/15/2020       Imaging:  Xr Chest Standard (2 Vw)    Result Date: 3/17/2020  Patient MRN: [id number] : 1980 Age:  46 years Gender: Female Order Date: 3/17/2020 11:45 AM Exam: XR CHEST (2 VW) Number of Images: 2 view Indication:  Shortness of breath. r/o sarcoidosis r/o sarcoidosis Comparison: None. FINDINGS: Heart and pulmonary vascularity normal. Lungs clear. Costophrenic angles sharp. Normal aorta. No acute cardiopulmonary findings. Ct Head Wo Contrast    Result Date: 3/17/2020  Patient MRN: [id number] : 1980 Age:  46 years Gender: Female Order Date: 3/17/2020 1:45 PM Exam: CT HEAD WO CONTRAST Number of Images: 110 views Indication: Right hemianesthesia x3 days. Rule out any intracranial process before doing LP Rule out any intracranial process before doing LP Comparison: None. TECHNIQUE: Region of study: Head. CT images acquired without intravenous contrast. One or more of these dose optimization techniques were utilized: Automated exposure control; mA and/or kV adjustment per patient size (includes targeted exams where dose is matched to clinical indication); or iterative reconstruction. FINDINGS: No mass, hemorrhage or midline shift. Ventricles and sulci normal. Bony calvarium unremarkable. There are fluid levels in both maxillary sinuses compatible with sinusitis. There is opacification of multiple bilateral ethmoid air cells as well. Unremarkable brain. Maxillary and ethmoid sinusitis. Fl Lumbar Puncture Diag    Result Date: 3/18/2020  Patient MRN:  [id number] : 1980 Age: 46 years Gender: Female Order Date:  3/16/2020 10:30 AM EXAM: FL LUMBAR PUNCTURE DIAG NUMBER OF IMAGES:  3 INDICATION:  A lesion in thoracic spinal cord less than 3 vertebra in height COMPARISON: None FLUORO TIME : 1 minute Informed consent was obtained.   Following the routine sterile prep and drape and after the administration of local anesthesia, a 20g needle was inserted under fluoroscopic

## 2020-05-06 ENCOUNTER — OFFICE VISIT (OUTPATIENT)
Dept: NEUROLOGY | Age: 40
End: 2020-05-06
Payer: COMMERCIAL

## 2020-05-06 VITALS
HEART RATE: 85 BPM | WEIGHT: 273 LBS | BODY MASS INDEX: 41.37 KG/M2 | HEIGHT: 68 IN | SYSTOLIC BLOOD PRESSURE: 126 MMHG | TEMPERATURE: 98.1 F | OXYGEN SATURATION: 97 % | DIASTOLIC BLOOD PRESSURE: 86 MMHG | RESPIRATION RATE: 12 BRPM

## 2020-05-06 PROCEDURE — G8417 CALC BMI ABV UP PARAM F/U: HCPCS | Performed by: PSYCHIATRY & NEUROLOGY

## 2020-05-06 PROCEDURE — G8427 DOCREV CUR MEDS BY ELIG CLIN: HCPCS | Performed by: PSYCHIATRY & NEUROLOGY

## 2020-05-06 PROCEDURE — 99245 OFF/OP CONSLTJ NEW/EST HI 55: CPT | Performed by: PSYCHIATRY & NEUROLOGY

## 2020-05-06 NOTE — PROGRESS NOTES
This 67-year-old right-handed [address] woman was referred for additional evaluation and management of a thoracic spinal cord lesion. She was an excellent historian. Her  was an excellent one as well. Her medications included baclofen 10 mg 3 times daily. She previously received Solu-Medrol intravenously, with a steroid taper. Her past medical history was not remarkable for hypertension, diabetes mellitus, strokes, seizures, lung disease, heart disorders, gastrointestinal issues, cancers, skin abnormalities or depression. Her past surgical history was remarkable for cholecystectomy, tympanostomy tube placement and tubal ligation. She had no known allergies or trauma. She was a native of [facility]. She had lived in the [place] for 2 years. 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 She was  with 3 healthy children. She continued to smoke 1/2 pack of cigarettes daily. She did not drink or use illicit drugs. There was no family history of neurological disease or similar disorders. She denied any recent travel. She slept erratically. She snored throughout the night. She ate well. Review of systems was otherwise unremarkable, except as noted below. Her neurological problems began on March 13 of this year. She awoke with numbness starting from the right chest wall, down her flank and into her right leg and foot. She denied any right arm involvement or left-sided affection. She also noted weakness of the right leg. 1 day later there was a urgency, without incontinence of urine or stool. She reported no double vision, loss of vision, swallowing or chewing difficulties, speech deficits, poor memories, headaches, other pains, clumsiness, seizure-like activity or loss of consciousness. During hospitalization, an MRI of her thoracic spine revealed an intrinsic spinal cord lesion from T6-T8. This lesion was suspicious of demyelinating disease.   Spinal

## 2020-05-07 ENCOUNTER — TELEPHONE (OUTPATIENT)
Dept: NEUROLOGY | Age: 40
End: 2020-05-07

## 2020-05-13 ENCOUNTER — TELEPHONE (OUTPATIENT)
Dept: NEUROLOGY | Age: 40
End: 2020-05-13

## 2020-05-13 RX ORDER — BACLOFEN 20 MG/1
20 TABLET ORAL 3 TIMES DAILY
Qty: 90 TABLET | Refills: 5 | Status: SHIPPED
Start: 2020-05-13 | End: 2020-07-30 | Stop reason: SDUPTHER

## 2020-05-28 ENCOUNTER — HOSPITAL ENCOUNTER (OUTPATIENT)
Dept: MRI IMAGING | Age: 40
Discharge: HOME | End: 2020-05-30
Payer: COMMERCIAL

## 2020-05-28 PROCEDURE — 70553 MRI BRAIN STEM W/O & W/DYE: CPT

## 2020-05-28 PROCEDURE — 6360000004 HC RX CONTRAST MEDICATION: Performed by: RADIOLOGY

## 2020-05-28 PROCEDURE — A9579 GAD-BASE MR CONTRAST NOS,1ML: HCPCS | Performed by: RADIOLOGY

## 2020-05-28 PROCEDURE — 72156 MRI NECK SPINE W/O & W/DYE: CPT

## 2020-05-28 RX ADMIN — GADOTERIDOL 20 ML: 279.3 INJECTION, SOLUTION INTRAVENOUS at 20:15

## 2020-05-29 ENCOUNTER — PATIENT MESSAGE (OUTPATIENT)
Dept: NEUROLOGY | Age: 40
End: 2020-05-29

## 2020-06-08 ENCOUNTER — HOSPITAL ENCOUNTER (OUTPATIENT)
Dept: AUDIOLOGY | Age: 40
Discharge: HOME | End: 2020-06-08
Payer: COMMERCIAL

## 2020-06-08 PROBLEM — G35 MULTIPLE SCLEROSIS (HCC): Status: ACTIVE | Noted: 2020-06-08

## 2020-06-08 PROCEDURE — 95938 SOMATOSENSORY TESTING: CPT | Performed by: AUDIOLOGIST

## 2020-06-08 PROCEDURE — 95930 VISUAL EP TEST CNS W/I&R: CPT | Performed by: PSYCHIATRY & NEUROLOGY

## 2020-06-08 PROCEDURE — 95938 SOMATOSENSORY TESTING: CPT | Performed by: PSYCHIATRY & NEUROLOGY

## 2020-06-08 PROCEDURE — 95930 VISUAL EP TEST CNS W/I&R: CPT | Performed by: AUDIOLOGIST

## 2020-06-08 RX ORDER — SODIUM CHLORIDE 0.9 % (FLUSH) 0.9 %
10 SYRINGE (ML) INJECTION PRN
Status: CANCELLED | OUTPATIENT
Start: 2020-06-08

## 2020-06-08 RX ORDER — SODIUM CHLORIDE 9 MG/ML
INJECTION, SOLUTION INTRAVENOUS CONTINUOUS
Status: CANCELLED | OUTPATIENT
Start: 2020-06-08

## 2020-06-08 RX ORDER — ACETAMINOPHEN 325 MG/1
650 TABLET ORAL ONCE
Status: CANCELLED | OUTPATIENT
Start: 2020-06-08

## 2020-06-08 RX ORDER — METHYLPREDNISOLONE SODIUM SUCCINATE 125 MG/2ML
100 INJECTION, POWDER, LYOPHILIZED, FOR SOLUTION INTRAMUSCULAR; INTRAVENOUS ONCE
Status: CANCELLED | OUTPATIENT
Start: 2020-06-08

## 2020-06-08 RX ORDER — DIPHENHYDRAMINE HYDROCHLORIDE 50 MG/ML
25 INJECTION INTRAMUSCULAR; INTRAVENOUS ONCE
Status: CANCELLED | OUTPATIENT
Start: 2020-06-08

## 2020-06-08 RX ORDER — METHYLPREDNISOLONE SODIUM SUCCINATE 125 MG/2ML
125 INJECTION, POWDER, LYOPHILIZED, FOR SOLUTION INTRAMUSCULAR; INTRAVENOUS ONCE
Status: CANCELLED | OUTPATIENT
Start: 2020-06-08

## 2020-06-08 RX ORDER — METHYLPREDNISOLONE SODIUM SUCCINATE 125 MG/2ML
50 INJECTION, POWDER, LYOPHILIZED, FOR SOLUTION INTRAMUSCULAR; INTRAVENOUS ONCE
Status: CANCELLED
Start: 2020-06-08

## 2020-06-08 RX ORDER — EPINEPHRINE 1 MG/ML
0.3 INJECTION, SOLUTION, CONCENTRATE INTRAVENOUS PRN
Status: CANCELLED | OUTPATIENT
Start: 2020-06-08

## 2020-06-08 RX ORDER — DIPHENHYDRAMINE HYDROCHLORIDE 50 MG/ML
25 INJECTION INTRAMUSCULAR; INTRAVENOUS ONCE
Status: CANCELLED
Start: 2020-06-08

## 2020-06-08 RX ORDER — DIPHENHYDRAMINE HYDROCHLORIDE 50 MG/ML
50 INJECTION INTRAMUSCULAR; INTRAVENOUS ONCE
Status: CANCELLED | OUTPATIENT
Start: 2020-06-08

## 2020-06-08 NOTE — PROGRESS NOTES
PATTERN SHIFT VISUAL EVOKED RESPONSE TEST      This patient was referred for a Pattern Shift Visual Evoked Response test by Dr Giorgio Keating for suspected multiple sclerosis. DESCRIPTION:    The PSVER test was conducted using an Fz-Oz electrode montage. To elicit the response, a black and white  board pattern was presented via television and shifted at a rate of 1.9 per second. Responses were averaged over 100 presentations. Waveform morphology was good. The absolute latencies, interwave latencies and amplitudes were recorded. IMPRESSION:    Pattern Shift Visual Evoked Responses were within normal limits. Preston Benavides M.D., Interpreting Physician

## 2020-06-12 ENCOUNTER — TELEPHONE (OUTPATIENT)
Dept: NEUROLOGY | Age: 40
End: 2020-06-12

## 2020-06-12 NOTE — TELEPHONE ENCOUNTER
Pt called stating she is having no relief of spasms with Baclofen 20mg tid. Spasms are also causing severe pain on her R side so pt is asking for something to help her pain. Forwarding to Dr. Deny Alves for advisement. Pt also stated she got a denial letter for [facility] and would like to know what the next step would be. MA advised an appeal would likely be made but forwarding to Mariel Matthews RN, for clarification.   Electronically signed by Steve Salmeron on 6/12/20 at 4:05 PM EDT

## 2020-06-18 ENCOUNTER — TELEPHONE (OUTPATIENT)
Dept: NEUROLOGY | Age: 40
End: 2020-06-18

## 2020-06-18 ENCOUNTER — HOSPITAL ENCOUNTER (OUTPATIENT)
Age: 40
Discharge: HOME | End: 2020-06-18
Payer: COMMERCIAL

## 2020-06-18 LAB
ALBUMIN SERPL-MCNC: 3.7 G/DL (ref 3.5–5.2)
ALP BLD-CCNC: 91 U/L (ref 35–104)
ALT SERPL-CCNC: 166 U/L (ref 0–32)
ANION GAP SERPL CALCULATED.3IONS-SCNC: 12 MMOL/L (ref 7–16)
ANISOCYTOSIS: ABNORMAL
AST SERPL-CCNC: 122 U/L (ref 0–31)
BASOPHILS ABSOLUTE: 0 E9/L (ref 0–0.2)
BASOPHILS RELATIVE PERCENT: 0.3 % (ref 0–2)
BILIRUB SERPL-MCNC: <0.2 MG/DL (ref 0–1.2)
BUN BLDV-MCNC: 8 MG/DL (ref 6–20)
CALCIUM SERPL-MCNC: 8.7 MG/DL (ref 8.6–10.2)
CHLORIDE BLD-SCNC: 105 MMOL/L (ref 98–107)
CHOLESTEROL, TOTAL: 148 MG/DL (ref 0–199)
CO2: 23 MMOL/L (ref 22–29)
CREAT SERPL-MCNC: 0.6 MG/DL (ref 0.5–1)
EOSINOPHILS ABSOLUTE: 0.3 E9/L (ref 0.05–0.5)
EOSINOPHILS RELATIVE PERCENT: 2.6 % (ref 0–6)
FOLATE: 14.9 NG/ML (ref 4.8–24.2)
GFR AFRICAN AMERICAN: >60
GFR NON-AFRICAN AMERICAN: >60 ML/MIN/1.73
GLUCOSE BLD-MCNC: 74 MG/DL (ref 74–99)
HCT VFR BLD CALC: 35.8 % (ref 34–48)
HDLC SERPL-MCNC: 56 MG/DL
HEMOGLOBIN: 10.7 G/DL (ref 11.5–15.5)
HYPOCHROMIA: ABNORMAL
LDL CHOLESTEROL CALCULATED: 72 MG/DL (ref 0–99)
LYMPHOCYTES ABSOLUTE: 1.96 E9/L (ref 1.5–4)
LYMPHOCYTES RELATIVE PERCENT: 16.5 % (ref 20–42)
MCH RBC QN AUTO: 24.4 PG (ref 26–35)
MCHC RBC AUTO-ENTMCNC: 29.9 % (ref 32–34.5)
MCV RBC AUTO: 81.7 FL (ref 80–99.9)
MONOCYTES ABSOLUTE: 0.69 E9/L (ref 0.1–0.95)
MONOCYTES RELATIVE PERCENT: 6.1 % (ref 2–12)
NEUTROPHILS ABSOLUTE: 8.63 E9/L (ref 1.8–7.3)
NEUTROPHILS RELATIVE PERCENT: 74.8 % (ref 43–80)
PDW BLD-RTO: 16.6 FL (ref 11.5–15)
PLATELET # BLD: 177 E9/L (ref 130–450)
PMV BLD AUTO: 12.9 FL (ref 7–12)
POLYCHROMASIA: ABNORMAL
POTASSIUM SERPL-SCNC: 3.8 MMOL/L (ref 3.5–5)
RBC # BLD: 4.38 E12/L (ref 3.5–5.5)
SODIUM BLD-SCNC: 140 MMOL/L (ref 132–146)
TOTAL PROTEIN: 6.8 G/DL (ref 6.4–8.3)
TRIGL SERPL-MCNC: 98 MG/DL (ref 0–149)
VITAMIN B-12: 903 PG/ML (ref 211–946)
VLDLC SERPL CALC-MCNC: 20 MG/DL
WBC # BLD: 11.5 E9/L (ref 4.5–11.5)

## 2020-06-18 PROCEDURE — 82607 VITAMIN B-12: CPT

## 2020-06-18 PROCEDURE — 80053 COMPREHEN METABOLIC PANEL: CPT

## 2020-06-18 PROCEDURE — 82746 ASSAY OF FOLIC ACID SERUM: CPT

## 2020-06-18 PROCEDURE — 87798 DETECT AGENT NOS DNA AMP: CPT

## 2020-06-18 PROCEDURE — 36415 COLL VENOUS BLD VENIPUNCTURE: CPT

## 2020-06-18 PROCEDURE — 80061 LIPID PANEL: CPT

## 2020-06-18 PROCEDURE — 85025 COMPLETE CBC W/AUTO DIFF WBC: CPT

## 2020-06-25 ENCOUNTER — HOSPITAL ENCOUNTER (OUTPATIENT)
Dept: INFUSION THERAPY | Age: 40
Setting detail: INFUSION SERIES
Discharge: HOME | End: 2020-06-25
Payer: COMMERCIAL

## 2020-06-25 ENCOUNTER — TELEPHONE (OUTPATIENT)
Dept: NEUROLOGY | Age: 40
End: 2020-06-25

## 2020-06-25 VITALS
DIASTOLIC BLOOD PRESSURE: 97 MMHG | SYSTOLIC BLOOD PRESSURE: 145 MMHG | OXYGEN SATURATION: 100 % | TEMPERATURE: 97.9 F | HEART RATE: 87 BPM | RESPIRATION RATE: 18 BRPM

## 2020-06-25 DIAGNOSIS — G35 MULTIPLE SCLEROSIS (HCC): Primary | ICD-10-CM

## 2020-06-25 PROCEDURE — 6370000000 HC RX 637 (ALT 250 FOR IP): Performed by: PSYCHIATRY & NEUROLOGY

## 2020-06-25 PROCEDURE — 6360000002 HC RX W HCPCS: Performed by: PSYCHIATRY & NEUROLOGY

## 2020-06-25 PROCEDURE — 2580000003 HC RX 258: Performed by: PSYCHIATRY & NEUROLOGY

## 2020-06-25 PROCEDURE — 96366 THER/PROPH/DIAG IV INF ADDON: CPT

## 2020-06-25 PROCEDURE — 96376 TX/PRO/DX INJ SAME DRUG ADON: CPT

## 2020-06-25 PROCEDURE — 96375 TX/PRO/DX INJ NEW DRUG ADDON: CPT

## 2020-06-25 PROCEDURE — 96365 THER/PROPH/DIAG IV INF INIT: CPT

## 2020-06-25 RX ORDER — SODIUM CHLORIDE 0.9 % (FLUSH) 0.9 %
10 SYRINGE (ML) INJECTION PRN
Status: DISCONTINUED | OUTPATIENT
Start: 2020-06-25 | End: 2020-06-26 | Stop reason: HOSPADM

## 2020-06-25 RX ORDER — DIPHENHYDRAMINE HYDROCHLORIDE 50 MG/ML
25 INJECTION INTRAMUSCULAR; INTRAVENOUS ONCE
Status: COMPLETED | OUTPATIENT
Start: 2020-06-25 | End: 2020-06-25

## 2020-06-25 RX ORDER — SODIUM CHLORIDE 9 MG/ML
INJECTION, SOLUTION INTRAVENOUS CONTINUOUS
Status: ACTIVE | OUTPATIENT
Start: 2020-06-25 | End: 2020-06-25

## 2020-06-25 RX ORDER — DIPHENHYDRAMINE HYDROCHLORIDE 50 MG/ML
25 INJECTION INTRAMUSCULAR; INTRAVENOUS ONCE
Status: CANCELLED | OUTPATIENT
Start: 2020-07-09

## 2020-06-25 RX ORDER — ACETAMINOPHEN 325 MG/1
650 TABLET ORAL ONCE
Status: CANCELLED | OUTPATIENT
Start: 2020-07-09

## 2020-06-25 RX ORDER — METHYLPREDNISOLONE SODIUM SUCCINATE 125 MG/2ML
100 INJECTION, POWDER, LYOPHILIZED, FOR SOLUTION INTRAMUSCULAR; INTRAVENOUS ONCE
Status: COMPLETED | OUTPATIENT
Start: 2020-06-25 | End: 2020-06-25

## 2020-06-25 RX ORDER — SODIUM CHLORIDE 9 MG/ML
INJECTION, SOLUTION INTRAVENOUS CONTINUOUS
Status: CANCELLED | OUTPATIENT
Start: 2020-07-09

## 2020-06-25 RX ORDER — METHYLPREDNISOLONE SODIUM SUCCINATE 125 MG/2ML
100 INJECTION, POWDER, LYOPHILIZED, FOR SOLUTION INTRAMUSCULAR; INTRAVENOUS ONCE
Status: CANCELLED | OUTPATIENT
Start: 2020-07-09

## 2020-06-25 RX ORDER — METHYLPREDNISOLONE SODIUM SUCCINATE 125 MG/2ML
50 INJECTION, POWDER, LYOPHILIZED, FOR SOLUTION INTRAMUSCULAR; INTRAVENOUS ONCE
Status: COMPLETED | OUTPATIENT
Start: 2020-06-25 | End: 2020-06-25

## 2020-06-25 RX ORDER — EPINEPHRINE 1 MG/ML
0.3 INJECTION, SOLUTION, CONCENTRATE INTRAVENOUS PRN
Status: CANCELLED | OUTPATIENT
Start: 2020-07-09

## 2020-06-25 RX ORDER — DIPHENHYDRAMINE HYDROCHLORIDE 50 MG/ML
INJECTION INTRAMUSCULAR; INTRAVENOUS
Status: DISPENSED
Start: 2020-06-25 | End: 2020-06-25

## 2020-06-25 RX ORDER — ACETAMINOPHEN 325 MG/1
650 TABLET ORAL ONCE
Status: COMPLETED | OUTPATIENT
Start: 2020-06-25 | End: 2020-06-25

## 2020-06-25 RX ORDER — DIPHENHYDRAMINE HYDROCHLORIDE 50 MG/ML
50 INJECTION INTRAMUSCULAR; INTRAVENOUS ONCE
Status: CANCELLED | OUTPATIENT
Start: 2020-07-09

## 2020-06-25 RX ORDER — SODIUM CHLORIDE 0.9 % (FLUSH) 0.9 %
10 SYRINGE (ML) INJECTION PRN
Status: CANCELLED | OUTPATIENT
Start: 2020-07-09

## 2020-06-25 RX ORDER — METHYLPREDNISOLONE SODIUM SUCCINATE 125 MG/2ML
125 INJECTION, POWDER, LYOPHILIZED, FOR SOLUTION INTRAMUSCULAR; INTRAVENOUS ONCE
Status: CANCELLED | OUTPATIENT
Start: 2020-07-09

## 2020-06-25 RX ORDER — METHYLPREDNISOLONE SODIUM SUCCINATE 125 MG/2ML
50 INJECTION, POWDER, LYOPHILIZED, FOR SOLUTION INTRAMUSCULAR; INTRAVENOUS ONCE
Status: CANCELLED
Start: 2020-07-09

## 2020-06-25 RX ORDER — METHYLPREDNISOLONE SODIUM SUCCINATE 125 MG/2ML
INJECTION, POWDER, LYOPHILIZED, FOR SOLUTION INTRAMUSCULAR; INTRAVENOUS
Status: DISPENSED
Start: 2020-06-25 | End: 2020-06-25

## 2020-06-25 RX ORDER — DIPHENHYDRAMINE HYDROCHLORIDE 50 MG/ML
25 INJECTION INTRAMUSCULAR; INTRAVENOUS ONCE
Status: CANCELLED
Start: 2020-07-09

## 2020-06-25 RX ADMIN — METHYLPREDNISOLONE SODIUM SUCCINATE 100 MG: 125 INJECTION, POWDER, FOR SOLUTION INTRAMUSCULAR; INTRAVENOUS at 08:27

## 2020-06-25 RX ADMIN — SODIUM CHLORIDE: 9 INJECTION, SOLUTION INTRAVENOUS at 09:09

## 2020-06-25 RX ADMIN — SODIUM CHLORIDE, PRESERVATIVE FREE 10 ML: 5 INJECTION INTRAVENOUS at 08:41

## 2020-06-25 RX ADMIN — ACETAMINOPHEN 650 MG: 325 TABLET ORAL at 08:24

## 2020-06-25 RX ADMIN — DIPHENHYDRAMINE HYDROCHLORIDE 25 MG: 50 INJECTION, SOLUTION INTRAMUSCULAR; INTRAVENOUS at 08:33

## 2020-06-25 RX ADMIN — SODIUM CHLORIDE 300 MG: 9 INJECTION, SOLUTION INTRAVENOUS at 09:15

## 2020-06-25 RX ADMIN — SODIUM CHLORIDE, PRESERVATIVE FREE 10 ML: 5 INJECTION INTRAVENOUS at 08:20

## 2020-06-25 RX ADMIN — SODIUM CHLORIDE, PRESERVATIVE FREE 10 ML: 5 INJECTION INTRAVENOUS at 08:33

## 2020-06-25 RX ADMIN — DIPHENHYDRAMINE HYDROCHLORIDE 25 MG: 50 INJECTION, SOLUTION INTRAMUSCULAR; INTRAVENOUS at 11:19

## 2020-06-25 RX ADMIN — METHYLPREDNISOLONE SODIUM SUCCINATE 50 MG: 125 INJECTION, POWDER, FOR SOLUTION INTRAMUSCULAR; INTRAVENOUS at 11:20

## 2020-06-25 ASSESSMENT — PAIN SCALES - GENERAL: PAINLEVEL_OUTOF10: 7

## 2020-06-25 NOTE — PROGRESS NOTES
Spoke to Jaylin Dukes at Dr. Rashmi Rincon office regarding increasing patient's pre-medications due to her having a reaction today. Pt. stated \"her head, back, and eyes were itchy and she also had a scratchy throat and stuffy nose. \"  Rescue medications were given per order and Ocrevus was restarted per protocol. Finished dose with no issues. Discharged home in stable condition. Awaiting further orders for increase of pre medications.

## 2020-06-26 LAB
Lab: NORMAL
REPORT: NORMAL
THIS TEST SENT TO: NORMAL

## 2020-07-08 ENCOUNTER — HOSPITAL ENCOUNTER (OUTPATIENT)
Dept: INFUSION THERAPY | Age: 40
Setting detail: INFUSION SERIES
Discharge: HOME | End: 2020-07-08
Payer: COMMERCIAL

## 2020-07-08 VITALS
SYSTOLIC BLOOD PRESSURE: 134 MMHG | TEMPERATURE: 98.7 F | RESPIRATION RATE: 18 BRPM | HEART RATE: 80 BPM | DIASTOLIC BLOOD PRESSURE: 77 MMHG

## 2020-07-08 DIAGNOSIS — G35 MULTIPLE SCLEROSIS (HCC): Primary | ICD-10-CM

## 2020-07-08 PROCEDURE — 6370000000 HC RX 637 (ALT 250 FOR IP): Performed by: PSYCHIATRY & NEUROLOGY

## 2020-07-08 PROCEDURE — 6360000002 HC RX W HCPCS: Performed by: PSYCHIATRY & NEUROLOGY

## 2020-07-08 PROCEDURE — 96366 THER/PROPH/DIAG IV INF ADDON: CPT

## 2020-07-08 PROCEDURE — 96375 TX/PRO/DX INJ NEW DRUG ADDON: CPT

## 2020-07-08 PROCEDURE — 2580000003 HC RX 258: Performed by: PSYCHIATRY & NEUROLOGY

## 2020-07-08 PROCEDURE — 96365 THER/PROPH/DIAG IV INF INIT: CPT

## 2020-07-08 RX ORDER — SODIUM CHLORIDE 9 MG/ML
INJECTION, SOLUTION INTRAVENOUS CONTINUOUS
Status: CANCELLED | OUTPATIENT
Start: 2020-12-24

## 2020-07-08 RX ORDER — ACETAMINOPHEN 325 MG/1
650 TABLET ORAL ONCE
Status: CANCELLED | OUTPATIENT
Start: 2020-12-24

## 2020-07-08 RX ORDER — DIPHENHYDRAMINE HYDROCHLORIDE 50 MG/ML
50 INJECTION INTRAMUSCULAR; INTRAVENOUS ONCE
Status: CANCELLED | OUTPATIENT
Start: 2020-12-24

## 2020-07-08 RX ORDER — METHYLPREDNISOLONE SODIUM SUCCINATE 125 MG/2ML
50 INJECTION, POWDER, LYOPHILIZED, FOR SOLUTION INTRAMUSCULAR; INTRAVENOUS ONCE
Status: CANCELLED
Start: 2020-12-24

## 2020-07-08 RX ORDER — METHYLPREDNISOLONE SODIUM SUCCINATE 125 MG/2ML
100 INJECTION, POWDER, LYOPHILIZED, FOR SOLUTION INTRAMUSCULAR; INTRAVENOUS ONCE
Status: CANCELLED | OUTPATIENT
Start: 2020-12-24

## 2020-07-08 RX ORDER — ACETAMINOPHEN 325 MG/1
650 TABLET ORAL ONCE
Status: COMPLETED | OUTPATIENT
Start: 2020-07-08 | End: 2020-07-08

## 2020-07-08 RX ORDER — DIPHENHYDRAMINE HYDROCHLORIDE 50 MG/ML
25 INJECTION INTRAMUSCULAR; INTRAVENOUS ONCE
Status: CANCELLED | OUTPATIENT
Start: 2020-12-24

## 2020-07-08 RX ORDER — METHYLPREDNISOLONE SODIUM SUCCINATE 125 MG/2ML
100 INJECTION, POWDER, LYOPHILIZED, FOR SOLUTION INTRAMUSCULAR; INTRAVENOUS ONCE
Status: COMPLETED | OUTPATIENT
Start: 2020-07-08 | End: 2020-07-08

## 2020-07-08 RX ORDER — SODIUM CHLORIDE 0.9 % (FLUSH) 0.9 %
10 SYRINGE (ML) INJECTION PRN
Status: DISCONTINUED | OUTPATIENT
Start: 2020-07-08 | End: 2020-07-09 | Stop reason: HOSPADM

## 2020-07-08 RX ORDER — DIPHENHYDRAMINE HYDROCHLORIDE 50 MG/ML
25 INJECTION INTRAMUSCULAR; INTRAVENOUS ONCE
Status: COMPLETED | OUTPATIENT
Start: 2020-07-08 | End: 2020-07-08

## 2020-07-08 RX ORDER — DULOXETIN HYDROCHLORIDE 30 MG/1
30 CAPSULE, DELAYED RELEASE ORAL NIGHTLY
COMMUNITY
End: 2021-01-28

## 2020-07-08 RX ORDER — SODIUM CHLORIDE 9 MG/ML
INJECTION, SOLUTION INTRAVENOUS CONTINUOUS
Status: ACTIVE | OUTPATIENT
Start: 2020-07-08 | End: 2020-07-08

## 2020-07-08 RX ORDER — EPINEPHRINE 1 MG/ML
0.3 INJECTION, SOLUTION, CONCENTRATE INTRAVENOUS PRN
Status: CANCELLED | OUTPATIENT
Start: 2020-12-24

## 2020-07-08 RX ORDER — SODIUM CHLORIDE 0.9 % (FLUSH) 0.9 %
10 SYRINGE (ML) INJECTION PRN
Status: CANCELLED | OUTPATIENT
Start: 2020-12-24

## 2020-07-08 RX ORDER — DIPHENHYDRAMINE HYDROCHLORIDE 50 MG/ML
25 INJECTION INTRAMUSCULAR; INTRAVENOUS ONCE
Status: CANCELLED
Start: 2020-12-24

## 2020-07-08 RX ORDER — METHYLPREDNISOLONE SODIUM SUCCINATE 125 MG/2ML
125 INJECTION, POWDER, LYOPHILIZED, FOR SOLUTION INTRAMUSCULAR; INTRAVENOUS ONCE
Status: CANCELLED | OUTPATIENT
Start: 2020-12-24

## 2020-07-08 RX ADMIN — SODIUM CHLORIDE, PRESERVATIVE FREE 10 ML: 5 INJECTION INTRAVENOUS at 08:30

## 2020-07-08 RX ADMIN — METHYLPREDNISOLONE SODIUM SUCCINATE 100 MG: 125 INJECTION, POWDER, FOR SOLUTION INTRAMUSCULAR; INTRAVENOUS at 08:47

## 2020-07-08 RX ADMIN — SODIUM CHLORIDE, PRESERVATIVE FREE 10 ML: 5 INJECTION INTRAVENOUS at 08:50

## 2020-07-08 RX ADMIN — SODIUM CHLORIDE: 9 INJECTION, SOLUTION INTRAVENOUS at 12:12

## 2020-07-08 RX ADMIN — SODIUM CHLORIDE 300 MG: 9 INJECTION, SOLUTION INTRAVENOUS at 09:10

## 2020-07-08 RX ADMIN — ACETAMINOPHEN 650 MG: 325 TABLET ORAL at 08:50

## 2020-07-08 RX ADMIN — DIPHENHYDRAMINE HYDROCHLORIDE 25 MG: 50 INJECTION, SOLUTION INTRAMUSCULAR; INTRAVENOUS at 08:43

## 2020-07-08 ASSESSMENT — PAIN SCALES - GENERAL
PAINLEVEL_OUTOF10: 0
PAINLEVEL_OUTOF10: 8

## 2020-07-08 ASSESSMENT — PAIN DESCRIPTION - DESCRIPTORS: DESCRIPTORS: ACHING

## 2020-07-08 ASSESSMENT — PAIN DESCRIPTION - LOCATION: LOCATION: BACK

## 2020-07-08 ASSESSMENT — PAIN DESCRIPTION - ONSET: ONSET: ON-GOING

## 2020-07-08 ASSESSMENT — PAIN DESCRIPTION - PAIN TYPE: TYPE: CHRONIC PAIN

## 2020-07-08 ASSESSMENT — PAIN DESCRIPTION - FREQUENCY: FREQUENCY: CONTINUOUS

## 2020-07-08 ASSESSMENT — PAIN DESCRIPTION - ORIENTATION: ORIENTATION: MID

## 2020-07-10 RX ORDER — METHYLPREDNISOLONE SODIUM SUCCINATE 125 MG/2ML
100 INJECTION, POWDER, LYOPHILIZED, FOR SOLUTION INTRAMUSCULAR; INTRAVENOUS ONCE
Status: CANCELLED | OUTPATIENT
Start: 2020-12-24

## 2020-07-10 RX ORDER — ACETAMINOPHEN 325 MG/1
650 TABLET ORAL ONCE
Status: CANCELLED | OUTPATIENT
Start: 2020-12-24

## 2020-07-10 RX ORDER — DIPHENHYDRAMINE HYDROCHLORIDE 50 MG/ML
25 INJECTION INTRAMUSCULAR; INTRAVENOUS ONCE
Status: CANCELLED | OUTPATIENT
Start: 2020-12-24

## 2020-07-30 RX ORDER — BACLOFEN 20 MG/1
20 TABLET ORAL 3 TIMES DAILY
Qty: 90 TABLET | Refills: 5 | Status: SHIPPED
Start: 2020-07-30 | End: 2020-09-16 | Stop reason: ALTCHOICE

## 2020-07-31 ENCOUNTER — CLINICAL DOCUMENTATION (OUTPATIENT)
Dept: NEUROLOGY | Age: 40
End: 2020-07-31

## 2020-07-31 NOTE — PROGRESS NOTES
I attempted to call the patient today. She had previously left a message about continued headaches and confusion. She did not voice those complaints when initially evaluated. She had received her first dose of Ocrevus earlier this month. She was already on baclofen 20 mg 3 times daily and duloxetine. If she calls while awake, I would first recommend increasing her baclofen to 20 mg 4 times daily. If she wishes to find another neurologist, please help her in her endeavors. Otherwise, she may call the nurse practitioners for additional advice while I am away.   Thank you

## 2020-08-21 ENCOUNTER — TELEPHONE (OUTPATIENT)
Dept: NEUROLOGY | Age: 40
End: 2020-08-21

## 2020-09-16 ENCOUNTER — OFFICE VISIT (OUTPATIENT)
Dept: NEUROLOGY | Age: 40
End: 2020-09-16
Payer: COMMERCIAL

## 2020-09-16 ENCOUNTER — TELEPHONE (OUTPATIENT)
Dept: NEUROLOGY | Age: 40
End: 2020-09-16

## 2020-09-16 VITALS
RESPIRATION RATE: 18 BRPM | DIASTOLIC BLOOD PRESSURE: 76 MMHG | HEART RATE: 80 BPM | OXYGEN SATURATION: 98 % | SYSTOLIC BLOOD PRESSURE: 115 MMHG

## 2020-09-16 PROCEDURE — 99215 OFFICE O/P EST HI 40 MIN: CPT | Performed by: PSYCHIATRY & NEUROLOGY

## 2020-09-16 PROCEDURE — G8427 DOCREV CUR MEDS BY ELIG CLIN: HCPCS | Performed by: PSYCHIATRY & NEUROLOGY

## 2020-09-16 PROCEDURE — G8417 CALC BMI ABV UP PARAM F/U: HCPCS | Performed by: PSYCHIATRY & NEUROLOGY

## 2020-09-16 PROCEDURE — 4004F PT TOBACCO SCREEN RCVD TLK: CPT | Performed by: PSYCHIATRY & NEUROLOGY

## 2020-09-16 RX ORDER — TIZANIDINE HYDROCHLORIDE 2 MG/1
4 CAPSULE, GELATIN COATED ORAL 3 TIMES DAILY
Qty: 90 CAPSULE | Refills: 5 | Status: SHIPPED
Start: 2020-09-16 | End: 2020-09-16 | Stop reason: SDUPTHER

## 2020-09-16 RX ORDER — TIZANIDINE HYDROCHLORIDE 2 MG/1
1 CAPSULE, GELATIN COATED ORAL DAILY
COMMUNITY
End: 2020-09-16 | Stop reason: SDUPTHER

## 2020-09-16 RX ORDER — TIZANIDINE HYDROCHLORIDE 4 MG/1
4 CAPSULE, GELATIN COATED ORAL 3 TIMES DAILY
Qty: 90 CAPSULE | Refills: 5 | Status: SHIPPED
Start: 2020-09-16 | End: 2021-01-05

## 2020-09-16 NOTE — PROGRESS NOTES
This 56-year-old right-handed 1999 [address] woman was referred for evaluation and management of a thoracic spinal cord lesion. She was a good historian. Her daughter was at the visit but did not provide any history. Her medications were now tizanidine 2 mg 3 times daily, Ocrevus and duloxetine. She was no longer on baclofen. She just completed her loading dose of Ocrevus several weeks ago. Her past medical history was not remarkable for hypertension, diabetes mellitus, strokes, seizures, lung disease, heart disorders, gastrointestinal issues, cancers, skin abnormalities or depression. Her neurological problems began on March of this year. She awoke with numbness starting from the right chest wall, down her flank and into her right leg and foot. She denied any right arm involvement or left-sided affection. She also noted weakness of the right leg. 1 day later there was bladder urgency, without incontinence of urine or stool. She reported no double vision, loss of vision, swallowing or chewing difficulties, speech deficits, poor memories, headaches, other pains, clumsiness, seizure-like activity or loss of consciousness. During hospitalization, an MRI of her thoracic spine revealed an intrinsic spinal cord lesion from T6-T8. This lesion was suspicious for demyelinating disease. Spinal fluid analysis revealed a slightly increased IgG index. Unfortunately, oligoclonal bands were not performed. An MRI of her lumbosacral spine was unrevealing. She received IV Solu-Medrol therapy, with a steroid taper. She denied any improvement or worsening. She then underwent MRIs of her brain which revealed minimal to moderate lesion load, consistent with multiple sclerosis. The MRI of her cervical spine was unremarkable. She was then diagnosed with multiple sclerosis. Again she was placed on Ocrevus. She now noted numbness and weakness of her right leg.   She felt no improvement or worsening with her first 2 doses of Ocrevus. She felt tizanidine 2 mg 3 times daily was not effective. She continued on duloxetine 30 mg at night as well. She then reported a brain fog or slow thinking, but she was sleeping erratically. She continued using a walker, claiming she could not walk well without one. She had not fallen or injured herself. Review of systems was otherwise unremarkable. Physical examination displayed stable vital signs. She was young woman in no acute distress, who was alert, cooperative and oriented. She was very pleasant. She was moderately overweight. She displayed multiple tattoos, without other skin lesions. Head examination revealed a small posterior pharyngeal aperture. Her neck was supple without thyromegaly. Her lungs were clear to auscultation. Cardiac testing was unremarkable. Peripheral pulses were +1 without bruits. Her limbs displayed no abnormalities. Neurological examination produced an intact mental status. There was no dysarthria or aphasia. Cranial nerve testing was also unrevealing. I found no Jeramie pupil or red desaturation. There was also no hyperactive gag, jaw jerk or facial jerks. There were normal tone and bulk, with 5/5 strength in both arms and the left leg. In the right leg, she now displayed no efforts on strength testing. Reflexes were +1 throughout. I found no Babinskis or other pathological reflexes. Light touch, pinprick and vibration were decreased in the right leg, extending up the abdomen and chest wall to the T6, T7 level. There was a T8 sensory level on the spinous processes, on the right side, at T8. Finger-to-nose testing was unremarkable, as well as left heel-to-shin testing. Rapid alternating movements and fine finger movements were intact. She now stood on her own and again walked dragging her right foot, with decreased flexion of the right hip. She walked better with a walker. Laboratory data were noted above.   In addition, NMO antibodies were negative. VDRL was negative. Her IgG index was slightly increased. Hepatitis panel was negative, with a negative SPIKE and a rheumatoid factor of 10. INR was 1.1. This individual displays a thoracic cord lesion at T6-T8 on MRI imaging. Additional studies confirmed the diagnosis of multiple sclerosis. She will continue with Ocrevus. However, she now displays histrionic features as well. I will first increase tizanidine to 4 mg 3 times daily. We must avoid narcotics in this individual.    In the interim, she will receive right AFO bracing. She is stable medically. She will return in 6 months. She will continue with Ocrevus. Tizanidine was increased. She report back in 3 to 4 weeks. She will call at any time if problems arise. The patient was diagnosed with multiple sclerosis. She is in need of a left custom AFO and right KAFO to offset weakness, joint instability/malalignment, associated pain and proprioceptive feedback. These custom orthoses will provide multi-plane joint control of the ankle and foot. Length of need will be more than six months for ambulation and will prevent tissue injury. These will improve the patients gait, limb alignment, balance, and risk of falling. These will enable the patient to safely and effectively achieve an increased level of physical activity, leading to an overall reduction in pain and improved quality of life. I spent 40 minutes with the patient with over 50 % spent in counseling and disease management. All patient issues were addressed and all questions were answered.

## 2020-09-16 NOTE — TELEPHONE ENCOUNTER
When patient was checking out, she stated that she forgot to let Dr. Eliecer Montgomery know that she has been having memory issues and \"brain fog. \" She stated that she forgot because of the students that were with him during the visit.  Forwarded to Dr. Eliecer Montgomery

## 2020-09-22 ENCOUNTER — TELEPHONE (OUTPATIENT)
Dept: NEUROLOGY | Age: 40
End: 2020-09-22

## 2020-09-22 NOTE — TELEPHONE ENCOUNTER
Sent a fax to [place] to have Keily Hayes from [facility] June and July of 2020 replaced. Information was forwarded to Rajani Aguilar.

## 2020-10-16 ENCOUNTER — TELEPHONE (OUTPATIENT)
Dept: NEUROLOGY | Age: 40
End: 2020-10-16

## 2020-10-16 NOTE — TELEPHONE ENCOUNTER
LM for pt to call office with update regarding her Tizanidine . [facility] is requesting a alternative if patient is still taking it.

## 2020-10-21 NOTE — TELEPHONE ENCOUNTER
[facility] pharmacy update office that patients' Tizandine 4mg is no longer covered by her insurance. They are requesting an alternative. Please advise

## 2020-10-22 NOTE — TELEPHONE ENCOUNTER
We can provide baclofen 10 mg 3 times daily to start, if the patient agrees. If she does, please set up the prescription for me.   Thank you

## 2020-10-23 RX ORDER — TIZANIDINE 4 MG/1
4 TABLET ORAL 4 TIMES DAILY
Qty: 120 TABLET | Refills: 5 | Status: SHIPPED
Start: 2020-10-23 | End: 2021-01-13 | Stop reason: SDUPTHER

## 2020-12-02 ENCOUNTER — TELEPHONE (OUTPATIENT)
Dept: NEUROLOGY | Age: 40
End: 2020-12-02

## 2020-12-02 NOTE — TELEPHONE ENCOUNTER
Pt called stating PCP has referred her to a weight loss specialist. Specialist's office stated pt would be a candidate for weight loss surgery based her BMI but is waiting for an appt to discuss in more detail. Specialists office advised pt to contact us to see if neuro would \"be on board\" with pt having weight loss surgery. Forwarding to Dr. Rene Juares for advisement.   Electronically signed by Darron Lagunas on 12/2/20 at 1:15 PM EST

## 2020-12-02 NOTE — TELEPHONE ENCOUNTER
MA informed pt of providers response and pt understood.   Electronically signed by Jovanny Weinberg on 12/2/20 at 2:27 PM EST

## 2021-01-05 ENCOUNTER — HOSPITAL ENCOUNTER (OUTPATIENT)
Dept: INFUSION THERAPY | Age: 41
Setting detail: INFUSION SERIES
Discharge: HOME | End: 2021-01-05
Payer: COMMERCIAL

## 2021-01-05 ENCOUNTER — TELEPHONE (OUTPATIENT)
Dept: NEUROLOGY | Age: 41
End: 2021-01-05

## 2021-01-05 VITALS
OXYGEN SATURATION: 98 % | DIASTOLIC BLOOD PRESSURE: 86 MMHG | RESPIRATION RATE: 18 BRPM | HEART RATE: 94 BPM | TEMPERATURE: 98.7 F | SYSTOLIC BLOOD PRESSURE: 153 MMHG

## 2021-01-05 DIAGNOSIS — G35 MULTIPLE SCLEROSIS (HCC): Primary | ICD-10-CM

## 2021-01-05 PROCEDURE — 96376 TX/PRO/DX INJ SAME DRUG ADON: CPT

## 2021-01-05 PROCEDURE — 96365 THER/PROPH/DIAG IV INF INIT: CPT

## 2021-01-05 PROCEDURE — 96375 TX/PRO/DX INJ NEW DRUG ADDON: CPT

## 2021-01-05 PROCEDURE — 96366 THER/PROPH/DIAG IV INF ADDON: CPT

## 2021-01-05 PROCEDURE — 6370000000 HC RX 637 (ALT 250 FOR IP): Performed by: PSYCHIATRY & NEUROLOGY

## 2021-01-05 PROCEDURE — 6360000002 HC RX W HCPCS

## 2021-01-05 PROCEDURE — 2580000003 HC RX 258: Performed by: PSYCHIATRY & NEUROLOGY

## 2021-01-05 PROCEDURE — 6360000002 HC RX W HCPCS: Performed by: PSYCHIATRY & NEUROLOGY

## 2021-01-05 RX ORDER — METHYLPREDNISOLONE SODIUM SUCCINATE 125 MG/2ML
50 INJECTION, POWDER, LYOPHILIZED, FOR SOLUTION INTRAMUSCULAR; INTRAVENOUS ONCE
Status: COMPLETED | OUTPATIENT
Start: 2021-01-05 | End: 2021-01-05

## 2021-01-05 RX ORDER — DIPHENHYDRAMINE HYDROCHLORIDE 50 MG/ML
50 INJECTION INTRAMUSCULAR; INTRAVENOUS ONCE
Status: CANCELLED | OUTPATIENT
Start: 2021-06-08 | End: 2021-06-08

## 2021-01-05 RX ORDER — DIPHENHYDRAMINE HYDROCHLORIDE 50 MG/ML
INJECTION INTRAMUSCULAR; INTRAVENOUS
Status: COMPLETED
Start: 2021-01-05 | End: 2021-01-05

## 2021-01-05 RX ORDER — DIPHENHYDRAMINE HYDROCHLORIDE 50 MG/ML
25 INJECTION INTRAMUSCULAR; INTRAVENOUS ONCE
Status: COMPLETED | OUTPATIENT
Start: 2021-01-05 | End: 2021-01-05

## 2021-01-05 RX ORDER — DIPHENHYDRAMINE HYDROCHLORIDE 50 MG/ML
25 INJECTION INTRAMUSCULAR; INTRAVENOUS ONCE
Status: CANCELLED | OUTPATIENT
Start: 2021-06-08

## 2021-01-05 RX ORDER — ACETAMINOPHEN 325 MG/1
650 TABLET ORAL ONCE
Status: COMPLETED | OUTPATIENT
Start: 2021-01-05 | End: 2021-01-05

## 2021-01-05 RX ORDER — DIPHENHYDRAMINE HYDROCHLORIDE 50 MG/ML
25 INJECTION INTRAMUSCULAR; INTRAVENOUS ONCE
Status: CANCELLED
Start: 2021-06-08 | End: 2021-06-08

## 2021-01-05 RX ORDER — SODIUM CHLORIDE 0.9 % (FLUSH) 0.9 %
10 SYRINGE (ML) INJECTION PRN
Status: CANCELLED | OUTPATIENT
Start: 2021-06-08

## 2021-01-05 RX ORDER — METHYLPREDNISOLONE SODIUM SUCCINATE 125 MG/2ML
INJECTION, POWDER, LYOPHILIZED, FOR SOLUTION INTRAMUSCULAR; INTRAVENOUS
Status: DISCONTINUED
Start: 2021-01-05 | End: 2021-01-06 | Stop reason: HOSPADM

## 2021-01-05 RX ORDER — TRAZODONE HYDROCHLORIDE 50 MG/1
50 TABLET ORAL NIGHTLY
COMMUNITY
End: 2021-10-20

## 2021-01-05 RX ORDER — SODIUM CHLORIDE 9 MG/ML
INJECTION, SOLUTION INTRAVENOUS CONTINUOUS
Status: ACTIVE | OUTPATIENT
Start: 2021-01-05 | End: 2021-01-05

## 2021-01-05 RX ORDER — SODIUM CHLORIDE 9 MG/ML
INJECTION, SOLUTION INTRAVENOUS CONTINUOUS
Status: CANCELLED | OUTPATIENT
Start: 2021-06-08

## 2021-01-05 RX ORDER — METHYLPREDNISOLONE SODIUM SUCCINATE 125 MG/2ML
100 INJECTION, POWDER, LYOPHILIZED, FOR SOLUTION INTRAMUSCULAR; INTRAVENOUS ONCE
Status: CANCELLED | OUTPATIENT
Start: 2021-06-08

## 2021-01-05 RX ORDER — ACETAMINOPHEN 325 MG/1
650 TABLET ORAL ONCE
Status: CANCELLED | OUTPATIENT
Start: 2021-06-08

## 2021-01-05 RX ORDER — SODIUM CHLORIDE 0.9 % (FLUSH) 0.9 %
10 SYRINGE (ML) INJECTION PRN
Status: DISCONTINUED | OUTPATIENT
Start: 2021-01-05 | End: 2021-01-06 | Stop reason: HOSPADM

## 2021-01-05 RX ORDER — METHYLPREDNISOLONE SODIUM SUCCINATE 125 MG/2ML
INJECTION, POWDER, LYOPHILIZED, FOR SOLUTION INTRAMUSCULAR; INTRAVENOUS
Status: COMPLETED
Start: 2021-01-05 | End: 2021-01-05

## 2021-01-05 RX ORDER — METHYLPREDNISOLONE SODIUM SUCCINATE 125 MG/2ML
50 INJECTION, POWDER, LYOPHILIZED, FOR SOLUTION INTRAMUSCULAR; INTRAVENOUS ONCE
Status: CANCELLED
Start: 2021-06-08 | End: 2021-06-08

## 2021-01-05 RX ORDER — METHYLPREDNISOLONE SODIUM SUCCINATE 125 MG/2ML
100 INJECTION, POWDER, LYOPHILIZED, FOR SOLUTION INTRAMUSCULAR; INTRAVENOUS ONCE
Status: COMPLETED | OUTPATIENT
Start: 2021-01-05 | End: 2021-01-05

## 2021-01-05 RX ADMIN — DIPHENHYDRAMINE HYDROCHLORIDE 25 MG: 50 INJECTION, SOLUTION INTRAMUSCULAR; INTRAVENOUS at 12:03

## 2021-01-05 RX ADMIN — METHYLPREDNISOLONE SODIUM SUCCINATE 50 MG: 125 INJECTION, POWDER, FOR SOLUTION INTRAMUSCULAR; INTRAVENOUS at 14:39

## 2021-01-05 RX ADMIN — SODIUM CHLORIDE: 9 INJECTION, SOLUTION INTRAVENOUS at 08:49

## 2021-01-05 RX ADMIN — METHYLPREDNISOLONE SODIUM SUCCINATE 50 MG: 125 INJECTION, POWDER, FOR SOLUTION INTRAMUSCULAR; INTRAVENOUS at 12:08

## 2021-01-05 RX ADMIN — DIPHENHYDRAMINE HYDROCHLORIDE 25 MG: 50 INJECTION, SOLUTION INTRAMUSCULAR; INTRAVENOUS at 08:59

## 2021-01-05 RX ADMIN — SODIUM CHLORIDE, PRESERVATIVE FREE 10 ML: 5 INJECTION INTRAVENOUS at 09:28

## 2021-01-05 RX ADMIN — DIPHENHYDRAMINE HYDROCHLORIDE 25 MG: 50 INJECTION INTRAMUSCULAR; INTRAVENOUS at 12:03

## 2021-01-05 RX ADMIN — SODIUM CHLORIDE 600 MG: 9 INJECTION, SOLUTION INTRAVENOUS at 10:12

## 2021-01-05 RX ADMIN — ACETAMINOPHEN 650 MG: 325 TABLET ORAL at 08:50

## 2021-01-05 RX ADMIN — METHYLPREDNISOLONE SODIUM SUCCINATE 100 MG: 125 INJECTION, POWDER, FOR SOLUTION INTRAMUSCULAR; INTRAVENOUS at 09:17

## 2021-01-05 RX ADMIN — METHYLPREDNISOLONE SODIUM SUCCINATE 50 MG: 125 INJECTION, POWDER, LYOPHILIZED, FOR SOLUTION INTRAMUSCULAR; INTRAVENOUS at 12:08

## 2021-01-05 RX ADMIN — SODIUM CHLORIDE, PRESERVATIVE FREE 10 ML: 5 INJECTION INTRAVENOUS at 09:27

## 2021-01-05 NOTE — PROGRESS NOTES
Patient had immediate relief of itching of her ears and itching of throat. Symptoms did diminish once IV infusion was stopped. Patient talked with her mother and sister and felt decrease in her anxiety level. Blood pressure slowly coming down. Call placed to Dr. Chata Angeles but office closed for lunch.

## 2021-01-05 NOTE — PROGRESS NOTES
Called to patient room. Patient reacting again with ears itching, thick throat, sinus drainage and chest heaviness with sweating. Oxygen saturation is 98 percent. Eneida Foil again stopped. Blood pressure elevated again  160/97. Dr. Venkat Day notified and orders received to stop infusion and give additional 50 mg of solumedrol. Emergency solumedrol given and symptoms are quickly subsiding. Blood pressure coming down.

## 2021-01-05 NOTE — PROGRESS NOTES
Symptoms of  Infusion reaction now gone. Patient received approximately 240 cc before stopping infusion altogether. Assisted up to bathroom twice today via wheelchair. Patient instructed to call Dr. Demetris Curtis office tomorrow for evaluation on moving forward. Blood pressure stable at 153/86. Patient declined discharge instructions but side effects reviewed with patient.

## 2021-01-05 NOTE — TELEPHONE ENCOUNTER
Heidi Duran from [facility] called stating that patient was having a possible reaction to her infusion Ocrevus. Pt is currently getting Ocrevus 600mg  @120 an hour . This is when patient complained of itching in throat, ears, and thickening in throat. BP increased to 154/124. Pt seemed to be having a panic attack. Benedryl and Solu Medrol was given and Ocrevus was stopped. Sarahann Mellow was resumed  45 min later. Blood pressure is again elevated at 160/97. Infusion rate is 80 .    Please advise

## 2021-01-05 NOTE — PROGRESS NOTES
Called to room after drip increased to 160. Patient complaining of her throat feeling itching and and her ears. Also complaining of her throat feeling congested. Blood pressure up and patient very anxious. Drip stopped immediately and rescue medications given. Patient is crying and emotional support given.

## 2021-01-13 ENCOUNTER — OFFICE VISIT (OUTPATIENT)
Dept: NEUROLOGY | Age: 41
End: 2021-01-13
Payer: COMMERCIAL

## 2021-01-13 VITALS
RESPIRATION RATE: 18 BRPM | BODY MASS INDEX: 43.35 KG/M2 | TEMPERATURE: 97.8 F | SYSTOLIC BLOOD PRESSURE: 136 MMHG | HEIGHT: 68 IN | OXYGEN SATURATION: 98 % | HEART RATE: 93 BPM | WEIGHT: 286 LBS | DIASTOLIC BLOOD PRESSURE: 80 MMHG

## 2021-01-13 DIAGNOSIS — G35 MULTIPLE SCLEROSIS (HCC): Primary | ICD-10-CM

## 2021-01-13 PROCEDURE — G8427 DOCREV CUR MEDS BY ELIG CLIN: HCPCS | Performed by: PSYCHIATRY & NEUROLOGY

## 2021-01-13 PROCEDURE — 1036F TOBACCO NON-USER: CPT | Performed by: PSYCHIATRY & NEUROLOGY

## 2021-01-13 PROCEDURE — G8417 CALC BMI ABV UP PARAM F/U: HCPCS | Performed by: PSYCHIATRY & NEUROLOGY

## 2021-01-13 PROCEDURE — G8484 FLU IMMUNIZE NO ADMIN: HCPCS | Performed by: PSYCHIATRY & NEUROLOGY

## 2021-01-13 PROCEDURE — 99215 OFFICE O/P EST HI 40 MIN: CPT | Performed by: PSYCHIATRY & NEUROLOGY

## 2021-01-13 RX ORDER — TIZANIDINE 4 MG/1
8 TABLET ORAL 3 TIMES DAILY
Qty: 180 TABLET | Refills: 5 | Status: SHIPPED
Start: 2021-01-13 | End: 2021-04-29

## 2021-01-13 NOTE — PROGRESS NOTES
This 71-year-old right-handed [age] [employer] woman was referred for evaluation and management of a thoracic spinal cord lesion. She is ultimately diagnosed with multiple sclerosis. She remained a good historian. Her medications were now tizanidine 4 mg 4 times daily, Ocrevus and duloxetine. She was no longer on baclofen. She returned early to this office due to difficulty tolerating her second Ocrevus infusion. Her past medical history was not remarkable for hypertension, diabetes mellitus, strokes, seizures, lung disease, heart disorders, gastrointestinal issues, cancers, skin abnormalities or depression. Her neurological problems began on March of last year. She awoke with numbness starting from the right chest wall, down her flank and into her right leg and foot. She denied right arm involvement or left-sided affection. She also noted weakness of the right leg. 1 day later, there was bladder urgency, without double incontinence. She reported no double vision, loss of vision, swallowing or chewing difficulties, speech deficits, poor memories, headaches, other pains, clumsiness, seizure-like activity or loss of consciousness. An MRI of her thoracic spine revealed an intrinsic spinal cord lesion from T6-T8. This lesion was suspicious for demyelinating disease. Spinal fluid analysis revealed a slightly increased IgG index. Unfortunately, oligoclonal bands were not performed. An MRI of her lumbosacral spine was unrevealing. She received IV Solu-Medrol therapy, with a steroid taper. She denied any improvement or worsening. MRIs of her brain revealed minimal to moderate lesion load, consistent with multiple sclerosis. The MRI of her cervical spine was unremarkable. She was diagnosed with multiple sclerosis and prescribed Ocrevus. She tolerated the first 2 loading doses, without issues.   However, 3 weeks ago, while receiving another Ocrevus infusion, she developed tightness in her throat, itching all over and elevated blood pressures. That infusion was discontinued prematurely. She did not wish to take that medication again. Fortunately, she recovered well and within hours after that infusion. She still noted numbness and weakness of her right leg. She felt no improvement or worsening with her first 2 doses of Ocrevus. She felt tizanidine 4 mg 4 times daily was minimally effective. She continued on duloxetine 30 mg at night as well. She then reported a brain fog or slow thinking, but she was sleeping erratically. She continued using a walker, claiming she could not walk well without one. She had not fallen or injured herself. Her family was helping with all transfers. She claimed her bracing's were too heavy. There were no cognitive issues, visual abnormalities, speech deficits or swallowing chewing problems. She denied any medical issues. She requested medical marijuana. I informed her I did not feel this drug would be helpful. Review of systems was otherwise unremarkable. Physical examination displayed stable vital signs. She was young woman in no acute distress, who was alert, cooperative and oriented. She remained pleasant. She continued moderately overweight. Head examination revealed a small posterior pharyngeal aperture. Her neck was supple without thyromegaly. Her lungs were clear to auscultation. Cardiac testing was unremarkable. Peripheral pulses were +1 without bruits. Her limbs displayed no abnormalities. Neurological examination produced an intact mental status. There was no dysarthria or aphasia. Cranial nerve testing was also unrevealing. I again found no Jeramie pupil or red desaturation. There was no hyperactive gag, jaw jerk or facial jerks. There were normal tone and bulk, with 5/5 strength in both arms and the left leg. In the right leg, she again displayed minimal efforts on strength testing. Reflexes were +1 throughout.   I found no Babinskis or other pathological reflexes. Light touch, pinprick and vibration were decreased in the right leg, extending up the abdomen and chest wall to the T6, T7 levels. There was a T8 sensory level on the spinous processes, on the right side, at T8. Finger-to-nose testing was unremarkable, as well as left heel-to-shin testing. Rapid alternating movements and fine finger movements were intact. She did not stand today. Laboratory data were noted above. In addition, NMO antibodies were negative. VDRL was negative. Her IgG index was slightly increased. Hepatitis panel was negative, with a negative SPIKE and a rheumatoid factor of 10. INR was 1.1. This individual displays a thoracic cord lesion at T6-T8 on MRI imaging. Additional studies confirmed the diagnosis of multiple sclerosis. She will c stay off Ocrevus. Hopefully, she will respond to Clarise Hortensia, which she was willing to take. However, she again displays histrionic feature. I will increase tizanidine to 8 mg 3 times daily. We must avoid narcotics in this individual.      She is stable medically. She will return in 3 months. Roger Litten was discontinued; hopefully, she can obtain Clarise Hortensia. Additional blood work was ordered, including serum IgM levels. Tizanidine was increased. She report back in 3 to 4 weeks. She will call at any time if problems arise. I spent 40 minutes with the patient with over 50 % spent in counseling and disease management. All patient issues were addressed and all questions were answered.

## 2021-01-15 ENCOUNTER — HOSPITAL ENCOUNTER (OUTPATIENT)
Age: 41
Discharge: HOME | End: 2021-01-15
Payer: COMMERCIAL

## 2021-01-15 DIAGNOSIS — G35 MULTIPLE SCLEROSIS (HCC): ICD-10-CM

## 2021-01-15 LAB
ALBUMIN SERPL-MCNC: 3.8 G/DL (ref 3.5–5.2)
ALP BLD-CCNC: 95 U/L (ref 35–104)
ALT SERPL-CCNC: 12 U/L (ref 0–32)
ANION GAP SERPL CALCULATED.3IONS-SCNC: 9 MMOL/L (ref 7–16)
ANISOCYTOSIS: ABNORMAL
AST SERPL-CCNC: 15 U/L (ref 0–31)
BASOPHILS ABSOLUTE: 0.05 E9/L (ref 0–0.2)
BASOPHILS RELATIVE PERCENT: 0.3 % (ref 0–2)
BILIRUB SERPL-MCNC: 0.4 MG/DL (ref 0–1.2)
BUN BLDV-MCNC: 10 MG/DL (ref 6–20)
CALCIUM SERPL-MCNC: 8.9 MG/DL (ref 8.6–10.2)
CHLORIDE BLD-SCNC: 103 MMOL/L (ref 98–107)
CO2: 25 MMOL/L (ref 22–29)
CREAT SERPL-MCNC: 0.7 MG/DL (ref 0.5–1)
EOSINOPHILS ABSOLUTE: 0.59 E9/L (ref 0.05–0.5)
EOSINOPHILS RELATIVE PERCENT: 3.7 % (ref 0–6)
GFR AFRICAN AMERICAN: >60
GFR NON-AFRICAN AMERICAN: >60 ML/MIN/1.73
GLUCOSE BLD-MCNC: 99 MG/DL (ref 74–99)
HCT VFR BLD CALC: 31.9 % (ref 34–48)
HEMOGLOBIN: 9.1 G/DL (ref 11.5–15.5)
HYPOCHROMIA: ABNORMAL
IMMATURE GRANULOCYTES #: 0.17 E9/L
IMMATURE GRANULOCYTES %: 1.1 % (ref 0–5)
LYMPHOCYTES ABSOLUTE: 1.66 E9/L (ref 1.5–4)
LYMPHOCYTES RELATIVE PERCENT: 10.3 % (ref 20–42)
MCH RBC QN AUTO: 21.4 PG (ref 26–35)
MCHC RBC AUTO-ENTMCNC: 28.5 % (ref 32–34.5)
MCV RBC AUTO: 74.9 FL (ref 80–99.9)
MONOCYTES ABSOLUTE: 1.31 E9/L (ref 0.1–0.95)
MONOCYTES RELATIVE PERCENT: 8.2 % (ref 2–12)
NEUTROPHILS ABSOLUTE: 12.27 E9/L (ref 1.8–7.3)
NEUTROPHILS RELATIVE PERCENT: 76.4 % (ref 43–80)
PDW BLD-RTO: 18.9 FL (ref 11.5–15)
PLATELET # BLD: 279 E9/L (ref 130–450)
PMV BLD AUTO: 11.1 FL (ref 7–12)
POLYCHROMASIA: ABNORMAL
POTASSIUM SERPL-SCNC: 3.8 MMOL/L (ref 3.5–5)
RBC # BLD: 4.26 E12/L (ref 3.5–5.5)
SODIUM BLD-SCNC: 137 MMOL/L (ref 132–146)
STOMATOCYTES: ABNORMAL
TOTAL PROTEIN: 7 G/DL (ref 6.4–8.3)
WBC # BLD: 16.1 E9/L (ref 4.5–11.5)

## 2021-01-15 PROCEDURE — 36415 COLL VENOUS BLD VENIPUNCTURE: CPT

## 2021-01-15 PROCEDURE — 80053 COMPREHEN METABOLIC PANEL: CPT

## 2021-01-15 PROCEDURE — 80074 ACUTE HEPATITIS PANEL: CPT

## 2021-01-15 PROCEDURE — 87798 DETECT AGENT NOS DNA AMP: CPT

## 2021-01-15 PROCEDURE — 82784 ASSAY IGA/IGD/IGG/IGM EACH: CPT

## 2021-01-15 PROCEDURE — 85025 COMPLETE CBC W/AUTO DIFF WBC: CPT

## 2021-01-16 LAB — IGM: 114 MG/DL (ref 40–230)

## 2021-01-18 LAB
HAV IGM SER IA-ACNC: NORMAL
HEPATITIS B CORE IGM ANTIBODY: NORMAL
HEPATITIS B SURFACE ANTIGEN INTERPRETATION: NORMAL
HEPATITIS C ANTIBODY INTERPRETATION: NORMAL

## 2021-01-20 ENCOUNTER — TELEPHONE (OUTPATIENT)
Dept: NEUROLOGY | Age: 41
End: 2021-01-20

## 2021-01-22 LAB
Lab: NORMAL
REPORT: NORMAL
THIS TEST SENT TO: NORMAL

## 2021-01-27 ENCOUNTER — TELEPHONE (OUTPATIENT)
Dept: NEUROLOGY | Age: 41
End: 2021-01-27

## 2021-01-28 ENCOUNTER — INITIAL CONSULT (OUTPATIENT)
Dept: BARIATRICS/WEIGHT MGMT | Age: 41
End: 2021-01-28
Payer: COMMERCIAL

## 2021-01-28 VITALS
BODY MASS INDEX: 43.8 KG/M2 | DIASTOLIC BLOOD PRESSURE: 85 MMHG | HEART RATE: 99 BPM | SYSTOLIC BLOOD PRESSURE: 152 MMHG | WEIGHT: 289 LBS | TEMPERATURE: 97.1 F | HEIGHT: 68 IN | RESPIRATION RATE: 20 BRPM

## 2021-01-28 DIAGNOSIS — I10 ESSENTIAL HYPERTENSION: ICD-10-CM

## 2021-01-28 DIAGNOSIS — E66.01 MORBID (SEVERE) OBESITY DUE TO EXCESS CALORIES (HCC): ICD-10-CM

## 2021-01-28 DIAGNOSIS — E66.01 MORBID OBESITY DUE TO EXCESS CALORIES (HCC): ICD-10-CM

## 2021-01-28 DIAGNOSIS — K30 INDIGESTION: Primary | ICD-10-CM

## 2021-01-28 DIAGNOSIS — K21.9 GASTROESOPHAGEAL REFLUX DISEASE WITHOUT ESOPHAGITIS: ICD-10-CM

## 2021-01-28 PROCEDURE — G8484 FLU IMMUNIZE NO ADMIN: HCPCS | Performed by: SURGERY

## 2021-01-28 PROCEDURE — 99204 OFFICE O/P NEW MOD 45 MIN: CPT | Performed by: SURGERY

## 2021-01-28 PROCEDURE — 99202 OFFICE O/P NEW SF 15 MIN: CPT

## 2021-01-28 PROCEDURE — G8417 CALC BMI ABV UP PARAM F/U: HCPCS | Performed by: SURGERY

## 2021-01-28 PROCEDURE — G8427 DOCREV CUR MEDS BY ELIG CLIN: HCPCS | Performed by: SURGERY

## 2021-01-28 RX ORDER — SODIUM CHLORIDE, SODIUM LACTATE, POTASSIUM CHLORIDE, CALCIUM CHLORIDE 600; 310; 30; 20 MG/100ML; MG/100ML; MG/100ML; MG/100ML
INJECTION, SOLUTION INTRAVENOUS CONTINUOUS
Status: CANCELLED | OUTPATIENT
Start: 2021-01-28

## 2021-01-28 NOTE — PROGRESS NOTES
Patient has tried the following programs to lose weight in the past, but none have yielded substantial, long-term success: Yes Atkins   Yes Herbal Life    No Jericho Lisa  Yes [place] Weight Loss    No Liquid protein fast  No Medifast    No Optifast   No Overeaters Anonymous    No Supa Wayne  Yes Slim Fast    No Clementina Powter  No TOPS    Yes Andrew Butler Watchers  No Dexatrim    No Redux   No Fenfluramine    No Meridia   No Phentermine    No Xenical   No Physician Supervised    No Self-Imposed      Other:      Total number of years dieting: Off and on for 4 years

## 2021-01-28 NOTE — PATIENT INSTRUCTIONS
What is the next step to proceed with weight loss surgery? Please be aware that any co-pays or deductibles may be requested prior to testing and / or procedures. You will need to schedule a psychological evaluation for weight loss surgery. Patients will be required to complete all psychological testing as required by the mental health provider. Patients must also follow all of the provider's recommendations before weight loss surgery can be scheduled. The evaluation must be done a standard way for weight loss surgery. We strongly recommend that you contact one of our preferred providers listed below to arrange this:      Jennifer Gerber, [facility]-  [address] [facility], [address] [facility] and [facility] [place], [place]   [phone]    Dr. Susana Bustamante, PhD    Margie Kincaid. [place], [place]    [phone]      You will also need to plan on attending a 2 hour nutrition class at [facility] prior to your surgery. We will schedule this for you when we schedule your surgery. Please remember to have your labs drawn 10 days prior to your first scheduled dietary appointment. Please remember, that while we will submit your case to insurance for surgery authorization, it is your responsibility to know if your plan covers weight loss surgery and keep up-to-date with changes to your insurance coverage. We will do everything possible to help you get approved for weight loss surgery, but cannot guarantee an approval.     Please note that you will not be submitted to your insurance company until all pre-operative testing requirements are met.

## 2021-01-28 NOTE — PROGRESS NOTES
Don Gerard  1/28/2021  [facility]    Initial Evaluation  History and Physical   EGD       CHIEF COMPLAINT: Morbid obesity, malnutrition, Hypertension, acid reflux on Rolaids. HISTORY OF PRESENT ILLNESS: Don Gerard is a morbidly-obese 45 y.o.  female, who weighs 289 lb (131.1 kg). She is 150 pounds over her ideal body weight. The Body mass index is 43.94 kg/m². She has multiple medical problems aggravated by her obesity. She wishes to have bariatric surgery so that she can lose a large amount of weight and keep the weight off. I have met with [facility] in [facility] where we discussed the surgery in great detail and went over the risks and benefits. She has watched our informational video so she understands all of the extensive risks involved. She states that she understands all of the risks and wishes to proceed with the evaluation. Weight:  289 lb (131.1 kg) 1/28/2021 initial    Patient Active Problem List   Diagnosis Code    Complete lesion at unspecified level of thoracic spinal cord, initial encounter ([facility]) S24.119A    Numbness and tingling of right lower extremity R20.0, R20.2    Abnormal MRI, spinal cord R90.89    Multiple sclerosis ([facility] [address] G35     Past Surgical History:   Procedure Laterality Date    CHOLECYSTECTOMY      TUBAL LIGATION      TYMPANOSTOMY TUBE PLACEMENT        Allergies: Patient has no known allergies. Home Medications  Prior to Visit Medications    Medication Sig Taking?  Authorizing Provider   tiZANidine (ZANAFLEX) 4 MG tablet Take 2 tablets by mouth 3 times daily  Don Reich MD   traZODone (DESYREL) 50 MG tablet Take 50 mg by mouth nightly  Historical Provider, MD   Ocrelizumab (OCREVUS IV) Infuse 600 mg intravenously Every 6 months  Historical Provider, MD   DULoxetine (CYMBALTA) 30 MG extended release capsule Take 30 mg by mouth nightly  Historical Provider, MD     Social History:   TOBACCO:   reports that she quit smoking about 3 years ago. Her smoking use included cigarettes. She smoked 0.50 packs per day. She has never used smokeless tobacco.  All smokers must join the free smoking cessation program and stop smoking for 3 months before having any Bariatric surgery. ETOH:    reports previous alcohol use. Family History   Problem Relation Age of Onset    Heart Disease Mother     High Blood Pressure Mother     Heart Attack Father      Review of Systems:  Psychiatric:  depression and anxiety  Respiratory: negative  Cardiovascular: negative  Gastrointestinal: negative  Musculoskeletal:negative  All others reviewed, negative    Physical Exam:   VITALS: Blood pressure (!) 152/85, pulse 99, temperature 97.1 °F (36.2 °C), temperature source Temporal, resp. rate 20, height 5' 8\" (1.727 m), weight 289 lb (131.1 kg). General appearance: alert, appears stated age and cooperative, does not ambulate easily  Head: Normocephalic, without obvious abnormality, atraumatic  Eyes: PERRL  Ears/mouth/throat:  Ears clear, mouth normal, throat no redness  Neck: no adenopathy, no JVD, supple, symmetrical, trachea midline and thyroid not enlarged  Lungs: clear to auscultation bilaterally  Heart: regular rate and rhythm  Abdomen: soft, non-tender; bowel sounds normal; no masses,  no organomegaly  Extremities: extremities normal, atraumatic, no cyanosis or edema  Skin: no open wounds    Assessment:  Morbid obesity with inability to keep the weight off with diet and exercise. She is interested in Laparoscopic Evonne-en- Y Gastric Bypass or Sleeve Gastrectomy. We discussed that our goal is to ameliorate the medical problems and not to obtain a specific body mass index. She understands the risks and benefits, and wishes to proceed with the evaluation. Plan:  Psych evaluation, mammogram, pap test, medical and cardiac clearance. These patients often have vitamin deficiencies so I will do screening labs for malnutrition.  I will do an

## 2021-01-29 NOTE — TELEPHONE ENCOUNTER
Joycelyn Dage was denied, however an Appeal was filed. Pt called stating that she is hesitant  about starting Joycelyn Dage, and would like to speak with Dr Amanda Dash regarding other MS medications.  Phone visit scheduled for

## 2021-01-29 NOTE — TELEPHONE ENCOUNTER
Please inform the patient that there are few other options available. She requires aggressive therapies such as the new drug or Ocrevus. Since she did not tolerate Ocrevus, we have few other options. If she wishes, we can use less aggressive disease modifying therapies; however, these increase her risk of recurrent disease.   Thank you

## 2021-02-01 NOTE — TELEPHONE ENCOUNTER
Informed the patient that there are few other options available. She requires aggressive therapies such as the new drug or Ocrevus. Since she did not tolerate Ocrevus, we have few other options. Also told patient there are  less aggressive disease modifying therapies; however, these increase her risk of recurrent disease.  Pt is scheduled for a follow up appt 2/10/21

## 2021-02-02 ENCOUNTER — HOSPITAL ENCOUNTER (OUTPATIENT)
Age: 41
Discharge: HOME | End: 2021-02-02
Payer: COMMERCIAL

## 2021-02-02 DIAGNOSIS — E66.01 MORBID OBESITY DUE TO EXCESS CALORIES (HCC): ICD-10-CM

## 2021-02-02 LAB
ALBUMIN SERPL-MCNC: 3.7 G/DL (ref 3.5–5.2)
ALP BLD-CCNC: 80 U/L (ref 35–104)
ALT SERPL-CCNC: 12 U/L (ref 0–32)
ANION GAP SERPL CALCULATED.3IONS-SCNC: 12 MMOL/L (ref 7–16)
AST SERPL-CCNC: 15 U/L (ref 0–31)
BILIRUB SERPL-MCNC: <0.2 MG/DL (ref 0–1.2)
BUN BLDV-MCNC: 12 MG/DL (ref 6–20)
CALCIUM SERPL-MCNC: 8.6 MG/DL (ref 8.6–10.2)
CHLORIDE BLD-SCNC: 102 MMOL/L (ref 98–107)
CHOLESTEROL, TOTAL: 208 MG/DL (ref 0–199)
CO2: 26 MMOL/L (ref 22–29)
CREAT SERPL-MCNC: 0.7 MG/DL (ref 0.5–1)
FERRITIN: 9 NG/ML
FOLATE: 15.9 NG/ML (ref 4.8–24.2)
GFR AFRICAN AMERICAN: >60
GFR NON-AFRICAN AMERICAN: >60 ML/MIN/1.73
GLUCOSE BLD-MCNC: 79 MG/DL (ref 74–99)
HBA1C MFR BLD: 5 % (ref 4–5.6)
HCT VFR BLD CALC: 32.5 % (ref 34–48)
HEMOGLOBIN: 9.3 G/DL (ref 11.5–15.5)
MCH RBC QN AUTO: 21.2 PG (ref 26–35)
MCHC RBC AUTO-ENTMCNC: 28.6 % (ref 32–34.5)
MCV RBC AUTO: 74.2 FL (ref 80–99.9)
PDW BLD-RTO: 18.6 FL (ref 11.5–15)
PLATELET # BLD: 269 E9/L (ref 130–450)
PMV BLD AUTO: 11.8 FL (ref 7–12)
POTASSIUM SERPL-SCNC: 3.6 MMOL/L (ref 3.5–5)
RBC # BLD: 4.38 E12/L (ref 3.5–5.5)
SODIUM BLD-SCNC: 140 MMOL/L (ref 132–146)
TOTAL PROTEIN: 6.6 G/DL (ref 6.4–8.3)
TRIGL SERPL-MCNC: 183 MG/DL (ref 0–149)
VITAMIN B-12: 464 PG/ML (ref 211–946)
VITAMIN D 25-HYDROXY: 15 NG/ML (ref 30–100)
WBC # BLD: 12.9 E9/L (ref 4.5–11.5)

## 2021-02-02 PROCEDURE — 85027 COMPLETE CBC AUTOMATED: CPT

## 2021-02-02 PROCEDURE — 84255 ASSAY OF SELENIUM: CPT

## 2021-02-02 PROCEDURE — 84478 ASSAY OF TRIGLYCERIDES: CPT

## 2021-02-02 PROCEDURE — 82746 ASSAY OF FOLIC ACID SERUM: CPT

## 2021-02-02 PROCEDURE — 82525 ASSAY OF COPPER: CPT

## 2021-02-02 PROCEDURE — 82728 ASSAY OF FERRITIN: CPT

## 2021-02-02 PROCEDURE — 84425 ASSAY OF VITAMIN B-1: CPT

## 2021-02-02 PROCEDURE — 80053 COMPREHEN METABOLIC PANEL: CPT

## 2021-02-02 PROCEDURE — 82306 VITAMIN D 25 HYDROXY: CPT

## 2021-02-02 PROCEDURE — 82465 ASSAY BLD/SERUM CHOLESTEROL: CPT

## 2021-02-02 PROCEDURE — 82607 VITAMIN B-12: CPT

## 2021-02-02 PROCEDURE — 36415 COLL VENOUS BLD VENIPUNCTURE: CPT

## 2021-02-02 PROCEDURE — 83036 HEMOGLOBIN GLYCOSYLATED A1C: CPT

## 2021-02-02 PROCEDURE — 84630 ASSAY OF ZINC: CPT

## 2021-02-05 LAB
COPPER: 155.1 UG/DL (ref 80–155)
SELENIUM: 118.7 UG/L (ref 23–190)
ZINC: 71.2 UG/DL (ref 60–120)

## 2021-02-06 LAB — VITAMIN B1 WHOLE BLOOD: 111 NMOL/L (ref 70–180)

## 2021-02-10 ENCOUNTER — VIRTUAL VISIT (OUTPATIENT)
Dept: NEUROLOGY | Age: 41
End: 2021-02-10
Payer: COMMERCIAL

## 2021-02-10 ENCOUNTER — TELEPHONE (OUTPATIENT)
Dept: NEUROLOGY | Age: 41
End: 2021-02-10

## 2021-02-10 DIAGNOSIS — G35 MULTIPLE SCLEROSIS (HCC): ICD-10-CM

## 2021-02-10 PROCEDURE — 99443 PR PHYS/QHP TELEPHONE EVALUATION 21-30 MIN: CPT | Performed by: PSYCHIATRY & NEUROLOGY

## 2021-02-10 RX ORDER — DIMETHYL FUMARATE 120 MG/1
120 CAPSULE ORAL 2 TIMES DAILY
Qty: 60 CAPSULE | Refills: 5 | Status: SHIPPED
Start: 2021-02-10 | End: 2021-02-18 | Stop reason: SDUPTHER

## 2021-02-10 RX ORDER — ERGOCALCIFEROL 1.25 MG/1
1 CAPSULE ORAL WEEKLY
COMMUNITY
Start: 2021-01-22 | End: 2022-07-22

## 2021-02-10 NOTE — TELEPHONE ENCOUNTER
Spoke with pt and notified her that she did not show for her appointment with Dr Pramod Jones. Phone number for office was given to patient.  She will call and set up appointment

## 2021-02-10 NOTE — PROGRESS NOTES
Ulises Ortiz was read the following message We want to confirm that, for purposes of billing, this is a virtual visit with your provider for which we will submit a claim for reimbursement with your insurance company. You will be responsible for any copays, coinsurance amounts or other amounts not covered by your insurance company. If you do not accept this, unfortunately we will not be able to schedule or proceed with a virtual visit with the provider. Do you accept? Justina Wei responded Yes .

## 2021-02-10 NOTE — PROGRESS NOTES
This 64-year-old right-handed 1999 [employer] woman was referred for evaluation and management of a thoracic spinal cord lesion. She was then diagnosed with multiple sclerosis. She remained a good historian. This visit was per telephone. Her medications were now tizanidine 16 mg daily, Ocrevus and vitamin D. She returned early to this office due to difficulty tolerating her second Ocrevus infusion. She was now reluctant to use Rissa Iqra as well. However, she was willing to use oral agents. Her past medical history was not remarkable for hypertension, diabetes mellitus, strokes, seizures, lung disease, heart disorders, gastrointestinal issues, cancers, skin abnormalities or depression. Her neurological problems began 2 years ago. She awoke with numbness starting from the right chest wall, down her flank, into her right leg and foot. She denied right arm involvement or left-sided affection. She also noted weakness of the right leg. 1 day later, there was bladder urgency, without double incontinence. She denied double vision, loss of vision, swallowing or chewing difficulties, speech deficits, poor memories, headaches, other pains, clumsiness, seizure-like activity or loss of consciousness. An MRI of her thoracic spine revealed an intramedullary lesion from T6-T8, suspicious for demyelinating disease. Spinal fluid analysis produced a slightly increased IgG index. Unfortunately, oligoclonal bands were not performed. An MRI of her lumbosacral spine was unrevealing. She received IV Solu-Medrol therapy, with a steroid taper. She denied any improvement or worsening with this treatment. MRIs of her brain revealed minimal to moderate lesion load, consistent with multiple sclerosis. The MRI of her cervical spine was unremarkable. She was diagnosed with multiple sclerosis and prescribed Ocrevus. She tolerated the first 2 loading doses, without issues. However, 3 weeks ago, while receiving the next Ocrevus infusion, she developed tightness in her throat, itching all over and elevated blood pressures. That infusion was discontinued prematurely. She did not wish to take that medication again. Fortunately, she recovered well and within hours after that infusion. I recommended the alternative to Ocrevus. She now refused to take Kesimpta injections    She still noted numbness and weakness of her right leg. She felt no improvement or worsening with her first 2 doses of Ocrevus. She now noted cramping in her hands despite taking tizanidine 4 mg twice daily and 8 mg at night. She is no longer on duloxetine. Fortunately, she denied any brain fog or slow thinking; however she was still sleeping erratically. She continued using a walker, claiming she could not walk well without one. She had not fallen or injured herself. Her family was helping with all transfers. There were no cognitive issues, visual abnormalities, speech deficits or swallowing chewing problems. She denied medical issues. She requested medical marijuana. I informed her I did not feel this drug would be helpful. She is practicing proper pandemic precautions. She was anxious awaiting a COVID-19 vaccine. Review of systems was otherwise unremarkable. She was afebrile and in no acute distress. She was breathing comfortably, without chest pain or shortness of breath. She denied any palpitations. She was alert, cooperative and oriented. She remained pleasant. Her skin was unremarkable. Her abdomen was nontender. Her limbs displayed no abnormalities. Neurological examination produced an intact mental status. There was no dysarthria or aphasia. Cranial nerve testing was unrevealing per the patient. She found normal bulk, with presumed preserved strength. Reflexes were +1 throughout. Light touch was intact in all limbs. She was not clumsy. She did not stand today. Per the patient, her neurological examination was unchanged. Laboratory data included a negative MICHELLE virus assay, CMP, CBC with differential, but a vitamin D level of 15. This individual displays a thoracic cord lesion at T6-T8 on MRI imaging. Additional studies confirmed the diagnosis of multiple sclerosis. She will stay off any infusions, per her request.  Therefore, I started Tecfidera at 120 mg twice daily. She she was informed she must obtain routine blood work every 6 months. She was informed of the risk of gastrointestinal upset and the rare risk of progressive multifocal leukoencephalopathy. I question spasms in both hands with high doses of tizanidine. She remains stable medically. She will return in 5 months. Tecfidera was started 120 mg twice daily. No additional blood work was necessary at this time. She report back in 4 weeks. She will call at any time if problems arise. I spent 30 minutes with the patient with over 50 % spent in counseling and disease management. All patient issues were addressed and all questions were answered. Ani Real was a 45year old individual who was evaluated via telephone on 2/10/2021. Consent:  She and/or health care decision maker is aware that that she may receive a bill for this telephone service, depending on her insurance coverage, and has provided verbal consent to proceed--- yes. Documentation:  I communicated with the patient and/or health care decision maker about her multiple sclerosis. Details of this discussion including any medical advice provided per telephone.

## 2021-02-12 ENCOUNTER — TELEPHONE (OUTPATIENT)
Dept: NEUROLOGY | Age: 41
End: 2021-02-12

## 2021-02-12 ENCOUNTER — TELEPHONE (OUTPATIENT)
Dept: BARIATRICS/WEIGHT MGMT | Age: 41
End: 2021-02-12

## 2021-02-12 NOTE — TELEPHONE ENCOUNTER
Pt was scheduled for her initial nutrition appt today but did not show. Called and spoke with pt and rescheduled her appt for Friday, 2/19/21.

## 2021-02-12 NOTE — TELEPHONE ENCOUNTER
Spoke with Omar Mcclain @ [facility] ,patient , and notified her that the patient has chosen to go with an oral product instead of Thelda Purchase. Also spoke with patient who states that she has not gotten her Tecfidera yet. Her pharmacy has to special order the medication.

## 2021-02-16 RX ORDER — EPINEPHRINE 0.3 MG/.3ML
0.3 INJECTION SUBCUTANEOUS ONCE
Qty: 1 EACH | Refills: 1 | Status: SHIPPED
Start: 2021-02-16 | End: 2022-01-06

## 2021-02-18 ENCOUNTER — TELEPHONE (OUTPATIENT)
Dept: NEUROLOGY | Age: 41
End: 2021-02-18

## 2021-02-18 RX ORDER — DIMETHYL FUMARATE 120 MG/1
120 CAPSULE ORAL 2 TIMES DAILY
Qty: 60 CAPSULE | Refills: 5 | Status: SHIPPED
Start: 2021-02-18 | End: 2021-07-28 | Stop reason: ALTCHOICE

## 2021-02-18 NOTE — TELEPHONE ENCOUNTER
Spoke with patient who states that [facility] speciality pharmacy is out of network and that [address] is in her network. Phoned [facility] @ [phone]. Escribed prescription for Tecferda to [facility]. [facility] to reach out to patient by 2/23, also instructed patient to call [facility] if she did not hear anything by 2/23.

## 2021-02-18 NOTE — TELEPHONE ENCOUNTER
Spoke with pt who stated that she  picked up her epipen. Also notified her that her tecfedera was approved. Also spoke with [facility] speciality pharmacy and notified them that Shashank Steen ws approved. They ran patients insurance. Medication will be shipped when they reach patient and set up a delivery date.

## 2021-02-19 ENCOUNTER — TELEPHONE (OUTPATIENT)
Dept: BARIATRICS/WEIGHT MGMT | Age: 41
End: 2021-02-19

## 2021-02-19 NOTE — TELEPHONE ENCOUNTER
Pt was scheduled for her initial diet consult but did not show. Called pt and spoke with family member who said pt is not feeling well and is sleeping and he will have [address] call back when she is able to reschedule.

## 2021-02-23 ENCOUNTER — TELEPHONE (OUTPATIENT)
Dept: BARIATRICS/WEIGHT MGMT | Age: 41
End: 2021-02-23

## 2021-03-17 ENCOUNTER — TELEPHONE (OUTPATIENT)
Dept: BARIATRICS/WEIGHT MGMT | Age: 41
End: 2021-03-17

## 2021-03-17 NOTE — TELEPHONE ENCOUNTER
Pt was a no show for her initial appt on 2/19/21. Called at that time to reschedule pt but received no return call. Called today but pt was not available. Left messages on both of her listed phone numbers asking pt to please call me at [phone] to reschedule.

## 2021-04-09 ENCOUNTER — TELEPHONE (OUTPATIENT)
Dept: BARIATRICS/WEIGHT MGMT | Age: 41
End: 2021-04-09

## 2021-04-19 ENCOUNTER — TELEPHONE (OUTPATIENT)
Dept: NEUROLOGY | Age: 41
End: 2021-04-19

## 2021-04-19 NOTE — TELEPHONE ENCOUNTER
States last 2 weeks she has had the diarrhea (lost 20lbs in 2 weeks), nausea, forgetfulness.  On Tecfidera since Feb. 2021  Please advise  Electronically signed by Trev Washington on 4/19/21 at 2:13 PM EDT

## 2021-04-20 NOTE — TELEPHONE ENCOUNTER
Her diarrhea may be the result of her Taxotere administration. Please have her reduce that medication to once daily for now and have her report back in 3 to 4 weeks.   Thank you

## 2021-04-20 NOTE — TELEPHONE ENCOUNTER
Pt was instructed to decrease her Tecfidera  120mg to once daily. Pt verbalized understanding. Will update office in 3- 4 weeks.

## 2021-04-29 RX ORDER — TIZANIDINE 4 MG/1
TABLET ORAL
Qty: 540 TABLET | Refills: 1 | Status: SHIPPED
Start: 2021-04-29 | End: 2021-08-03

## 2021-04-29 NOTE — TELEPHONE ENCOUNTER
Pharmacy requesting 90-day supply. Forwarding to provider as Dr. Elias Pi is out of office.   Electronically signed by Elisa Caruso on 4/29/21 at 11:16 AM EDT

## 2021-07-20 ENCOUNTER — HOSPITAL ENCOUNTER (EMERGENCY)
Age: 41
Discharge: HOME | End: 2021-07-20
Payer: COMMERCIAL

## 2021-07-20 VITALS
SYSTOLIC BLOOD PRESSURE: 134 MMHG | HEART RATE: 92 BPM | TEMPERATURE: 98 F | OXYGEN SATURATION: 92 % | RESPIRATION RATE: 18 BRPM | WEIGHT: 280 LBS | DIASTOLIC BLOOD PRESSURE: 106 MMHG | BODY MASS INDEX: 42.57 KG/M2

## 2021-07-20 DIAGNOSIS — M62.838 SPASM OF MUSCLE: Primary | ICD-10-CM

## 2021-07-20 DIAGNOSIS — G35 PERSONAL HISTORY OF MULTIPLE SCLEROSIS (HCC): ICD-10-CM

## 2021-07-20 PROCEDURE — 6370000000 HC RX 637 (ALT 250 FOR IP): Performed by: PHYSICIAN ASSISTANT

## 2021-07-20 PROCEDURE — 99283 EMERGENCY DEPT VISIT LOW MDM: CPT

## 2021-07-20 RX ORDER — DIAZEPAM 5 MG/1
5 TABLET ORAL EVERY 8 HOURS PRN
Qty: 12 TABLET | Refills: 0 | Status: SHIPPED | OUTPATIENT
Start: 2021-07-20 | End: 2021-07-24

## 2021-07-20 RX ORDER — OXYCODONE HYDROCHLORIDE AND ACETAMINOPHEN 5; 325 MG/1; MG/1
1 TABLET ORAL EVERY 6 HOURS PRN
Qty: 16 TABLET | Refills: 0 | Status: SHIPPED | OUTPATIENT
Start: 2021-07-20 | End: 2021-07-24

## 2021-07-20 RX ORDER — DIAZEPAM 5 MG/1
5 TABLET ORAL ONCE
Status: COMPLETED | OUTPATIENT
Start: 2021-07-20 | End: 2021-07-20

## 2021-07-20 RX ORDER — OXYCODONE HYDROCHLORIDE AND ACETAMINOPHEN 5; 325 MG/1; MG/1
1 TABLET ORAL ONCE
Status: COMPLETED | OUTPATIENT
Start: 2021-07-20 | End: 2021-07-20

## 2021-07-20 RX ADMIN — DIAZEPAM 5 MG: 5 TABLET ORAL at 12:09

## 2021-07-20 RX ADMIN — OXYCODONE AND ACETAMINOPHEN 1 TABLET: 5; 325 TABLET ORAL at 12:09

## 2021-07-20 ASSESSMENT — PAIN SCALES - GENERAL
PAINLEVEL_OUTOF10: 9
PAINLEVEL_OUTOF10: 9

## 2021-07-20 NOTE — ED NOTES
FIRST PROVIDER CONTACT ASSESSMENT NOTE      Department of Emergency Medicine   Admit Date: No admission date for patient encounter. Chief Complaint: Spasms (in back and head   Hx of MS)      History of Present Illness:    Amber Briceno is a 45 y.o. female who presents to the ED for back pain and spasming. Pt has MS and states her home medications are not working for her.  Has not seen her neurologist.         -----------------END OF FIRST PROVIDER CONTACT ASSESSMENT NOTE--------------  Electronically signed by Nilesh Youssef PA-C   DD: 7/20/21               Nilesh Youssef PA-C  07/20/21 1159

## 2021-07-20 NOTE — ED PROVIDER NOTES
[facility]     Department of Emergency Medicine   ED  Provider Note  Admit Date/RoomTime: 7/20/2021 11:59 AM  ED Room: [facility]   Chief Complaint:   Spasms (in back and head   Hx of MS)    History of Present Illness      Eagle Jones is a 45 y.o. old female who presents to the ED for back spasms. Patient has a history of MS. She has been taking 8 mg Zanaflex three times daily but states it is not working anymore. She states she also feels pinpricks to her back. She states the spasms start in her lower back and radiate up into her neck and head. She has not followed with neurologist in quite some time. She is denying any trauma or injury. She denies any loss of bowel or bladder, chest pain, shortness of breath, abdominal pain, nausea, vomiting, or limb swelling. Patient is alert and oriented x3 and in no apparent distress at this exam.  She is nontoxic-appearing. Neurology: Dr. Ricci Presume     ROS   Pertinent positives and negatives are stated within HPI, all other systems reviewed and are negative. Past Medical History:  has a past medical history of Hypertension, Morbid (severe) obesity, and Numbness and tingling of right lower extremity. Past Surgical History:  has a past surgical history that includes Tubal ligation; Tympanostomy tube placement; and Cholecystectomy. Social History:  reports that she quit smoking about 3 years ago. Her smoking use included cigarettes. She smoked 0.50 packs per day. She has never used smokeless tobacco. She reports previous alcohol use. She reports current drug use. Drug: Marijuana. Family History: family history includes Heart Attack in her father; Heart Disease in her mother; High Blood Pressure in her mother. Allergies: Patient has no known allergies.     Physical Exam   VS:  BP (!) 134/106   Pulse 92   Temp 98 °F (36.7 °C)   Resp 18   Wt 280 lb (127 kg)   SpO2 92%   BMI 42.57 kg/m²      Oxygen Saturation Interpretation: Normal.    Constitutional:  Alert and oriented x4, development consistent with age, NAD  HEENT:  NC/NT. No bruising or lacerations, Airway patent. Neck:  Normal ROM. Supple. No meningeal signs. No midline tenderness, bilateral paravertebral tenderness and trapezius tenderness   Respiratory:  Clear to auscultation and breath sounds equal.  CV:  Regular rate and rhythm  GI:  Abdomen soft, nontender, non-distended, No firm or pulsatile mass. Back: upper, middle and lower thoracic spine and lumbar spine bilateral.             Tenderness: Moderate. Swelling: no.              Range of Motion: diminished range with pain. Skin:  no erythema, rash or swelling noted. Distal Function:              Motor deficit: none. Sensory deficit: none. Pulse deficit: none. Extremities: Full ROM x4,  No edema or tenderness, no calf tenderness   Integument:  Normal turgor. Warm, dry, without visible rash. Neurological: CN II-XII grossly intact, Motor functions intact   Gait: steady with wheeled walker     Lab / Imaging Results   (All laboratory and radiology results have been personally reviewed by myself)  Labs:  No results found for this visit on 07/20/21. Imaging: All Radiology results interpreted by Radiologist unless otherwise noted. No orders to display     ED Course / Medical Decision Making     Medications   oxyCODONE-acetaminophen (PERCOCET) 5-325 MG per tablet 1 tablet (1 tablet Oral Given 7/20/21 1209)   diazePAM (VALIUM) tablet 5 mg (5 mg Oral Given 7/20/21 1209)     Re-examination:  Patients symptoms are improving. She feels comfortable returning home     Consult(s):  None    Procedure(s):  None    Medical Decision Making: At this time the patient is without objective evidence of an acute process requiring inpatient management. Patient understands she must call neurologist to schedule follow-up appointment    Counseling:     The emergency provider has spoken with the patient/caregiver and discussed todays results, in addition to providing specific details for the plan of care and counseling regarding the diagnosis and prognosis. Questions are answered at this time and they are agreeable with the plan. I discussed the differential, results and discharge plan with the patient and/or family/friend/caregiver if present. I emphasized the importance of follow-up with the physician I referred them to in the timeframe recommended. I explained reasons for the patient to return to the Emergency Department. Additional verbal discharge instructions were also given and discussed with the patient to supplement those generated by the EMR. We also discussed medications that were prescribed (if any) including common side effects and interactions. The patient was advised to abstain from driving, operating heavy machinery or making significant decisions while taking medications such as opiates and muscle relaxers that may impair this. All questions were addressed. They understand return precautions and discharge instructions. The patient and/or family/friend/caregiver expressed understanding. Vitals were stable and they were in no distress at discharge. Assessment      1. Spasm of muscle    2. Personal history of multiple sclerosis ([facility] [address]      Plan   Discharge to home  Patient condition is good    New Medications     New Prescriptions    DIAZEPAM (VALIUM) 5 MG TABLET    Take 1 tablet by mouth every 8 hours as needed (muscle spasm) for up to 4 days. OXYCODONE-ACETAMINOPHEN (PERCOCET) 5-325 MG PER TABLET    Take 1 tablet by mouth every 6 hours as needed for Pain for up to 4 days. Electronically signed by Ileana Brunson PA-C   DD: 7/20/21  **This report was transcribed using voice recognition software. Every effort was made to ensure accuracy; however, inadvertent computerized transcription errors may be present.     END OF ED PROVIDER NOTE     Ileana Brunson PA-C  07/20/21 [address]

## 2021-07-23 ENCOUNTER — TELEPHONE (OUTPATIENT)
Dept: NEUROLOGY | Age: 41
End: 2021-07-23

## 2021-07-23 NOTE — TELEPHONE ENCOUNTER
Pt had called stating that she had  Increased spasms in her spine. She went to the emergency room. Appointment for a follow up was made 7/28.  Pt continues on Tecfidera

## 2021-07-28 ENCOUNTER — OFFICE VISIT (OUTPATIENT)
Dept: NEUROLOGY | Age: 41
End: 2021-07-28
Payer: COMMERCIAL

## 2021-07-28 VITALS
HEART RATE: 82 BPM | RESPIRATION RATE: 18 BRPM | SYSTOLIC BLOOD PRESSURE: 140 MMHG | DIASTOLIC BLOOD PRESSURE: 86 MMHG | OXYGEN SATURATION: 100 % | TEMPERATURE: 97.9 F

## 2021-07-28 DIAGNOSIS — G35 MULTIPLE SCLEROSIS (HCC): Primary | ICD-10-CM

## 2021-07-28 PROCEDURE — 99215 OFFICE O/P EST HI 40 MIN: CPT | Performed by: PSYCHIATRY & NEUROLOGY

## 2021-07-28 PROCEDURE — G8428 CUR MEDS NOT DOCUMENT: HCPCS | Performed by: PSYCHIATRY & NEUROLOGY

## 2021-07-28 PROCEDURE — G8417 CALC BMI ABV UP PARAM F/U: HCPCS | Performed by: PSYCHIATRY & NEUROLOGY

## 2021-07-28 PROCEDURE — 1036F TOBACCO NON-USER: CPT | Performed by: PSYCHIATRY & NEUROLOGY

## 2021-07-28 RX ORDER — DANTROLENE SODIUM 25 MG/1
25 CAPSULE ORAL NIGHTLY
Qty: 30 CAPSULE | Refills: 5 | Status: SHIPPED | OUTPATIENT
Start: 2021-07-28 | End: 2022-01-24

## 2021-07-28 RX ORDER — TRAZODONE HYDROCHLORIDE 100 MG/1
100 TABLET ORAL
COMMUNITY
End: 2021-10-20

## 2021-07-28 NOTE — PROGRESS NOTES
This 69-year-old right-handed [address] woman was referred for evaluation and management of a thoracic spinal cord lesion. She was then diagnosed with multiple sclerosis. She remained a good historian. Her medications were now tizanidine 4 mg 4 times daily, dimethyl fumarate, cholecalciferol and trazodone. She was no longer on baclofen or ocrelizumab. She had taken only 2 doses ocrelizumab. She was reportedly on dimethyl fumarate. Her medical history was not remarkable for hypertension, diabetes mellitus, strokes, seizures, lung disease, heart disorders, gastrointestinal issues, cancers, skin abnormalities or depression. Her neurological problems began on 17 months ago. She awoke with numbness from the right chest wall, down her flank and into her right leg and foot. She denied right arm involvement or left-sided affection. She observed weakness of the right leg. 1 day later, there was bladder urgency, without double incontinence. She denied double vision, loss of vision, swallowing or chewing difficulties, speech deficits, poor memories, headaches, other pains, clumsiness, seizure-like activity or loss of consciousness. An MRI of her thoracic spine revealed an intrinsic spinal cord lesion from T6-T8. This lesion was suspicious for demyelinating disease. Spinal fluid analysis revealed a slightly increased IgG index. Oligoclonal bands were not performed. An MRI of her lumbosacral spine was unrevealing. She received IV Solu-Medrol therapy, with a steroid taper. She denied improvement or worsening. MRIs of her brain revealed minimal to moderate lesion load, consistent with multiple sclerosis. The MRI of her cervical spine was unremarkable. She was diagnosed with multiple sclerosis and prescribed ocrelizumab. She tolerated the first 2 loading doses, without issues. While receiving her third infusion, she developed tightness in her throat, itching all over and elevated blood pressures. That infusion was discontinued. She did not wish to take that medication again. She underwent January last with limited skin on catheter did not put her on have been requested/follow-up note we will consider consent the there is nothing about I think she was still on she was now with [facility] Hearing when she got back on Tecfidera and you might to go back on 6 3. Hopefully she can obtain She recovered well and within hours after that infusion. She still noted numbness and weakness of her right leg. She felt no improvement or worsening. Tizanidine 4 mg 4 times daily remained only minimally effective. She was now on trazodone 100 mg twice daily to improve sleep. She still slept erratically. She continued using a walker; she had not fallen or injured herself. She was able to transfer on her own now. There were no cognitive issues, visual abnormalities, speech deficits or swallowing chewing problems. She denied any medical issues. She was not using medical marijuana. She had received her COVID-19 vaccinations. Review of systems was otherwise unremarkable. Physical examination displayed stable vital signs. She was a young woman in no acute distress, who was alert, cooperative and oriented. She remained pleasant. She remained moderately overweight. Head examination revealed a small posterior pharyngeal aperture. Her neck was supple without thyromegaly. Her lungs were clear to auscultation. Cardiac testing was unremarkable. Peripheral pulses were +1 without bruits. Her limbs displayed no abnormalities. Neurological examination produced an intact mental status. There was no dysarthria or aphasia. Cranial nerve testing was also unrevealing. I found no Jeramie pupil or red desaturation. There was no hyperactive gag, jaw jerk or facial jerks. There were normal tone and bulk, with 5/5 strength in both arms and the left leg.   In the right leg, she again displayed minimal efforts on strength

## 2021-07-29 ENCOUNTER — TELEPHONE (OUTPATIENT)
Dept: NEUROLOGY | Age: 41
End: 2021-07-29

## 2021-07-29 DIAGNOSIS — G35 MULTIPLE SCLEROSIS (HCC): Primary | ICD-10-CM

## 2021-08-03 RX ORDER — TIZANIDINE 4 MG/1
TABLET ORAL
Qty: 120 TABLET | Refills: 0 | Status: SHIPPED
Start: 2021-08-03 | End: 2021-12-10

## 2021-08-03 NOTE — TELEPHONE ENCOUNTER
Pt notified that Dr Garrison Sunshine ordered labs , and to have them completed within the next week . Pt verbalized understanding. She also needs a refill on her Zanaflex. Dr Garrison Sunshine is out of the office.

## 2021-08-23 ENCOUNTER — HOSPITAL ENCOUNTER (OUTPATIENT)
Age: 41
Discharge: HOME | End: 2021-08-23
Payer: COMMERCIAL

## 2021-08-23 DIAGNOSIS — G35 MULTIPLE SCLEROSIS (HCC): ICD-10-CM

## 2021-08-23 LAB
ALBUMIN SERPL-MCNC: 4 G/DL (ref 3.5–5.2)
ALP BLD-CCNC: 87 U/L (ref 35–104)
ALT SERPL-CCNC: 7 U/L (ref 0–32)
ANION GAP SERPL CALCULATED.3IONS-SCNC: 11 MMOL/L (ref 7–16)
ANISOCYTOSIS: ABNORMAL
AST SERPL-CCNC: 9 U/L (ref 0–31)
BASOPHILS ABSOLUTE: 0 E9/L (ref 0–0.2)
BASOPHILS RELATIVE PERCENT: 0.3 % (ref 0–2)
BILIRUB SERPL-MCNC: <0.2 MG/DL (ref 0–1.2)
BUN BLDV-MCNC: 10 MG/DL (ref 6–20)
CALCIUM SERPL-MCNC: 8.8 MG/DL (ref 8.6–10.2)
CHLORIDE BLD-SCNC: 104 MMOL/L (ref 98–107)
CO2: 23 MMOL/L (ref 22–29)
CREAT SERPL-MCNC: 0.7 MG/DL (ref 0.5–1)
EOSINOPHILS ABSOLUTE: 0.21 E9/L (ref 0.05–0.5)
EOSINOPHILS RELATIVE PERCENT: 1.7 % (ref 0–6)
GFR AFRICAN AMERICAN: >60
GFR NON-AFRICAN AMERICAN: >60 ML/MIN/1.73
GLUCOSE BLD-MCNC: 76 MG/DL (ref 74–99)
HCT VFR BLD CALC: 28.3 % (ref 34–48)
HEMOGLOBIN: 7.8 G/DL (ref 11.5–15.5)
HYPOCHROMIA: ABNORMAL
LYMPHOCYTES ABSOLUTE: 0.98 E9/L (ref 1.5–4)
LYMPHOCYTES RELATIVE PERCENT: 7.8 % (ref 20–42)
MCH RBC QN AUTO: 18.2 PG (ref 26–35)
MCHC RBC AUTO-ENTMCNC: 27.6 % (ref 32–34.5)
MCV RBC AUTO: 66 FL (ref 80–99.9)
MONOCYTES ABSOLUTE: 0.85 E9/L (ref 0.1–0.95)
MONOCYTES RELATIVE PERCENT: 6.9 % (ref 2–12)
MYELOCYTE PERCENT: 0.9 % (ref 0–0)
NEUTROPHILS ABSOLUTE: 10.25 E9/L (ref 1.8–7.3)
NEUTROPHILS RELATIVE PERCENT: 82.8 % (ref 43–80)
PDW BLD-RTO: 18 FL (ref 11.5–15)
PLATELET # BLD: 279 E9/L (ref 130–450)
PMV BLD AUTO: 10.8 FL (ref 7–12)
POIKILOCYTES: ABNORMAL
POLYCHROMASIA: ABNORMAL
POTASSIUM SERPL-SCNC: 3.9 MMOL/L (ref 3.5–5)
RBC # BLD: 4.29 E12/L (ref 3.5–5.5)
SEDIMENTATION RATE, ERYTHROCYTE: 29 MM/HR (ref 0–20)
SODIUM BLD-SCNC: 138 MMOL/L (ref 132–146)
TARGET CELLS: ABNORMAL
TOTAL PROTEIN: 7 G/DL (ref 6.4–8.3)
VITAMIN D 25-HYDROXY: 23 NG/ML (ref 30–100)
WBC # BLD: 12.2 E9/L (ref 4.5–11.5)

## 2021-08-23 PROCEDURE — 87798 DETECT AGENT NOS DNA AMP: CPT

## 2021-08-23 PROCEDURE — 85025 COMPLETE CBC W/AUTO DIFF WBC: CPT

## 2021-08-23 PROCEDURE — 85651 RBC SED RATE NONAUTOMATED: CPT

## 2021-08-23 PROCEDURE — 80053 COMPREHEN METABOLIC PANEL: CPT

## 2021-08-23 PROCEDURE — 80074 ACUTE HEPATITIS PANEL: CPT

## 2021-08-23 PROCEDURE — 82306 VITAMIN D 25 HYDROXY: CPT

## 2021-08-23 PROCEDURE — 36415 COLL VENOUS BLD VENIPUNCTURE: CPT

## 2021-08-23 PROCEDURE — 86592 SYPHILIS TEST NON-TREP QUAL: CPT

## 2021-08-24 LAB
HAV IGM SER IA-ACNC: NORMAL
HEPATITIS B CORE IGM ANTIBODY: NORMAL
HEPATITIS B SURFACE ANTIGEN INTERPRETATION: NORMAL
HEPATITIS C ANTIBODY INTERPRETATION: NORMAL
RPR: NORMAL

## 2021-08-29 LAB
Lab: NORMAL
REPORT: NORMAL
THIS TEST SENT TO: NORMAL

## 2021-09-09 ENCOUNTER — TELEPHONE (OUTPATIENT)
Dept: NEUROLOGY | Age: 41
End: 2021-09-09

## 2021-09-09 NOTE — TELEPHONE ENCOUNTER
Kayla Cano and they will call patients pharmacy [facility] to see if medication is approved. Spoke with patient who has received a call for Nena Jaocbo, who states that Nena Jacobo is approved.  She  is waiting for [facility] [address] [place] [address] to call with delivery time and cost.

## 2021-09-15 ENCOUNTER — TELEPHONE (OUTPATIENT)
Dept: NEUROLOGY | Age: 41
End: 2021-09-15

## 2021-09-15 NOTE — TELEPHONE ENCOUNTER
[facility] needed a new script they could not read previous order date    Electronically signed by Ty Cheek MA on 9/15/2021 at 1:42 PM

## 2021-09-17 RX ORDER — OFATUMUMAB 20 MG/.4ML
20 INJECTION, SOLUTION SUBCUTANEOUS
Qty: 1 PEN | Refills: 12 | Status: CANCELLED | OUTPATIENT
Start: 2021-09-17

## 2021-09-17 RX ORDER — OFATUMUMAB 20 MG/.4ML
INJECTION, SOLUTION SUBCUTANEOUS
Qty: 3 PEN | Refills: 0 | Status: CANCELLED | OUTPATIENT
Start: 2021-09-17

## 2021-10-04 ENCOUNTER — TELEPHONE (OUTPATIENT)
Dept: NEUROLOGY | Age: 41
End: 2021-10-04

## 2021-10-05 NOTE — TELEPHONE ENCOUNTER
Spoke with [facility] at [phone] . Updated pharmacist on date of order written. [facility] states that they will contact patient in 1 or 2 days. LM for patient regarding above.

## 2021-10-08 ENCOUNTER — TELEPHONE (OUTPATIENT)
Dept: NEUROLOGY | Age: 41
End: 2021-10-08

## 2021-10-08 NOTE — TELEPHONE ENCOUNTER
Chloe Clayton Approved from 10/1 to 10/06/2022. Auth # [id number].      LM for pt to call office with update/status of delivery date for [facility]

## 2021-10-12 ENCOUNTER — TELEPHONE (OUTPATIENT)
Dept: NEUROLOGY | Age: 41
End: 2021-10-12

## 2021-10-12 NOTE — TELEPHONE ENCOUNTER
Patient called stating that she had recently had labs drawn at her PCP [facility] and her lab work was [facility]". [facility] called and will fax labs over to office. Pt want to know if she should begin her Kesimpta?

## 2021-10-12 NOTE — TELEPHONE ENCOUNTER
Her only low levels are her iron studies. She requires iron supplements. This difficulty is no contraindication to using Kesimpta. Please have her start on those injections.

## 2021-10-13 NOTE — TELEPHONE ENCOUNTER
LM for pt that Dr Barbara Pretty  Reviewed patients labs. She has low levels of iron. Pt will need iron supplements by her PCP.  Its ok to use Kesimpta

## 2021-10-20 VITALS
WEIGHT: 250 LBS | DIASTOLIC BLOOD PRESSURE: 77 MMHG | BODY MASS INDEX: 37.89 KG/M2 | HEIGHT: 68 IN | HEART RATE: 76 BPM | OXYGEN SATURATION: 99 % | RESPIRATION RATE: 20 BRPM | TEMPERATURE: 98.7 F | SYSTOLIC BLOOD PRESSURE: 153 MMHG

## 2021-10-20 LAB
ABO/RH: NORMAL
ANION GAP SERPL CALCULATED.3IONS-SCNC: 10 MMOL/L (ref 7–16)
ANISOCYTOSIS: ABNORMAL
ANTIBODY SCREEN: NORMAL
BASOPHILS ABSOLUTE: 0 E9/L (ref 0–0.2)
BASOPHILS RELATIVE PERCENT: 0.4 % (ref 0–2)
BILIRUBIN URINE: NEGATIVE
BLOOD, URINE: NEGATIVE
BUN BLDV-MCNC: 7 MG/DL (ref 6–20)
CALCIUM SERPL-MCNC: 9.5 MG/DL (ref 8.6–10.2)
CHLORIDE BLD-SCNC: 105 MMOL/L (ref 98–107)
CLARITY: CLEAR
CO2: 25 MMOL/L (ref 22–29)
COLOR: YELLOW
CREAT SERPL-MCNC: 0.7 MG/DL (ref 0.5–1)
EOSINOPHILS ABSOLUTE: 0.29 E9/L (ref 0.05–0.5)
EOSINOPHILS RELATIVE PERCENT: 2.6 % (ref 0–6)
FERRITIN: 42 NG/ML
FOLATE: >20 NG/ML (ref 4.8–24.2)
GFR AFRICAN AMERICAN: >60
GFR NON-AFRICAN AMERICAN: >60 ML/MIN/1.73
GLUCOSE BLD-MCNC: 79 MG/DL (ref 74–99)
GLUCOSE URINE: NEGATIVE MG/DL
HCT VFR BLD CALC: 30.4 % (ref 34–48)
HEMOGLOBIN: 8.2 G/DL (ref 11.5–15.5)
HYPOCHROMIA: ABNORMAL
IRON SATURATION: 10 % (ref 15–50)
IRON: 34 MCG/DL (ref 37–145)
KETONES, URINE: NEGATIVE MG/DL
LACTIC ACID: 1.1 MMOL/L (ref 0.5–2.2)
LEUKOCYTE ESTERASE, URINE: NEGATIVE
LYMPHOCYTES ABSOLUTE: 1.34 E9/L (ref 1.5–4)
LYMPHOCYTES RELATIVE PERCENT: 12.2 % (ref 20–42)
MCH RBC QN AUTO: 18.9 PG (ref 26–35)
MCHC RBC AUTO-ENTMCNC: 27 % (ref 32–34.5)
MCV RBC AUTO: 70 FL (ref 80–99.9)
MONOCYTES ABSOLUTE: 0.45 E9/L (ref 0.1–0.95)
MONOCYTES RELATIVE PERCENT: 3.5 % (ref 2–12)
NEUTROPHILS ABSOLUTE: 9.18 E9/L (ref 1.8–7.3)
NEUTROPHILS RELATIVE PERCENT: 81.7 % (ref 43–80)
NITRITE, URINE: NEGATIVE
OVALOCYTES: ABNORMAL
PDW BLD-RTO: 23.6 FL (ref 11.5–15)
PH UA: 5.5 (ref 5–9)
PLATELET # BLD: 251 E9/L (ref 130–450)
PMV BLD AUTO: 10.7 FL (ref 7–12)
POIKILOCYTES: ABNORMAL
POLYCHROMASIA: ABNORMAL
POTASSIUM SERPL-SCNC: 3.7 MMOL/L (ref 3.5–5)
PROTEIN UA: NEGATIVE MG/DL
RBC # BLD: 4.34 E12/L (ref 3.5–5.5)
SODIUM BLD-SCNC: 140 MMOL/L (ref 132–146)
SPECIFIC GRAVITY UA: >=1.03 (ref 1–1.03)
STOMATOCYTES: ABNORMAL
TEAR DROP CELLS: ABNORMAL
TOTAL IRON BINDING CAPACITY: 338 MCG/DL (ref 250–450)
UROBILINOGEN, URINE: 0.2 E.U./DL
VITAMIN B-12: 570 PG/ML (ref 211–946)
WBC # BLD: 11.2 E9/L (ref 4.5–11.5)

## 2021-10-20 PROCEDURE — 86850 RBC ANTIBODY SCREEN: CPT

## 2021-10-20 PROCEDURE — 81003 URINALYSIS AUTO W/O SCOPE: CPT

## 2021-10-20 PROCEDURE — 82607 VITAMIN B-12: CPT

## 2021-10-20 PROCEDURE — 86900 BLOOD TYPING SEROLOGIC ABO: CPT

## 2021-10-20 PROCEDURE — 81240 F2 GENE: CPT

## 2021-10-20 PROCEDURE — 82728 ASSAY OF FERRITIN: CPT

## 2021-10-20 PROCEDURE — 83540 ASSAY OF IRON: CPT

## 2021-10-20 PROCEDURE — 80048 BASIC METABOLIC PNL TOTAL CA: CPT

## 2021-10-20 PROCEDURE — 85025 COMPLETE CBC W/AUTO DIFF WBC: CPT

## 2021-10-20 PROCEDURE — 83550 IRON BINDING TEST: CPT

## 2021-10-20 PROCEDURE — 81291 MTHFR GENE: CPT

## 2021-10-20 PROCEDURE — 81241 F5 GENE: CPT

## 2021-10-20 PROCEDURE — 81400 MOPATH PROCEDURE LEVEL 1: CPT

## 2021-10-20 PROCEDURE — 83605 ASSAY OF LACTIC ACID: CPT

## 2021-10-20 PROCEDURE — 86901 BLOOD TYPING SEROLOGIC RH(D): CPT

## 2021-10-20 PROCEDURE — 4500000002 HC ER NO CHARGE

## 2021-10-20 PROCEDURE — 82746 ASSAY OF FOLIC ACID SERUM: CPT

## 2021-10-20 PROCEDURE — 87088 URINE BACTERIA CULTURE: CPT

## 2021-10-20 RX ORDER — LANOLIN ALCOHOL/MO/W.PET/CERES
325 CREAM (GRAM) TOPICAL DAILY
COMMUNITY
End: 2022-08-02

## 2021-10-20 RX ORDER — TRAZODONE HYDROCHLORIDE 150 MG/1
150 TABLET ORAL NIGHTLY
COMMUNITY

## 2021-10-20 NOTE — ED NOTES
[facility] CONTACT ASSESSMENT NOTE                                                                                                Department of Emergency Medicine                                                      First Provider Note  10/20/21  10:19 AM EDT  NAME: Ten Blevins  : 1980  MRN: [id number]    Chief Complaint: Generalized Body Aches (Patient states she has body pains, and that her iron is low states just doesnt feel well )      History of Present Illness:   Ten Blevins is a 45 y.o. female who presents to the ED for low Hgb and Iron from recent labs taken at [facility]. Also having right sided low back pains. Has Hx of MS     Focused Physical Exam:  VS:    ED Triage Vitals   BP Temp Temp Source Pulse Resp SpO2 Height Weight   10/20/21 1017 10/20/21 1017 10/20/21 1017 10/20/21 1013 10/20/21 1017 10/20/21 1013 10/20/21 1017 10/20/21 1017   (!) 153/77 98.7 °F (37.1 °C) Oral 72 20 96 % 5' 8\" (1.727 m) 250 lb (113.4 kg)        General: Alert and in no apparent distress. Medical History:  has a past medical history of Hypertension, Morbid (severe) obesity, and Numbness and tingling of right lower extremity. Surgical History:  has a past surgical history that includes Tubal ligation; Tympanostomy tube placement; and Cholecystectomy. Social History:  reports that she quit smoking about 3 years ago. Her smoking use included cigarettes. She smoked 0.50 packs per day. She has never used smokeless tobacco. She reports previous alcohol use. She reports current drug use. Drug: Marijuana. Family History: family history includes Heart Attack in her father; Heart Disease in her mother; High Blood Pressure in her mother. Allergies: Patient has no known allergies.      Initial Plan of Care:  Initiate Treatment-Testing, Proceed toTreatment Area When Bed Available for ED Attending/MLP to Continue Care    -------------------------------------------------END OF FIRST PROVIDER CONTACT

## 2021-10-21 ENCOUNTER — HOSPITAL ENCOUNTER (EMERGENCY)
Age: 41
Discharge: HOME | End: 2021-10-21
Payer: COMMERCIAL

## 2021-10-21 ENCOUNTER — NURSE TRIAGE (OUTPATIENT)
Dept: OTHER | Facility: CLINIC | Age: 41
End: 2021-10-21

## 2021-10-21 LAB — URINE CULTURE, ROUTINE: NORMAL

## 2021-10-21 NOTE — TELEPHONE ENCOUNTER
Received call from [address]  at [facility] with Red Flag Complaint. Brief description of triage: 46 y/o MS  Pt is having new weakness on the left side , period of disorientation she states she forgets a lot she has to write everything down. Pt reports new  Left side weakness  And tingling  Onset x 3 weeks  Pt states she  Has had incontinence  With Ms     Pt was in the ER x 14 hours yesterday and was never evaluated. Triage indicates for patient to  Seen in office in  Office today or tomorrow     Care advice provided, patient verbalizes understanding; denies any other questions or concerns; instructed to call back for any new or worsening symptoms. Writer provided warm transfer to [facility]  at [facility] for appointment scheduling. Attention Provider: Thank you for allowing me to participate in the care of your patient. The patient was connected to triage in response to information provided to the ECC/PSC. Please do not respond through this encounter as the response is not directed to a shared pool. Reason for Disposition   Patient wants to be seen    Answer Assessment - Initial Assessment Questions  1. SYMPTOM: \"What is the main symptom you are concerned about? \" (e.g., weakness, numbness)      Tingling on left leg     2. ONSET: \"When did this start? \" (minutes, hours, days; while sleeping)     3 weeks    3. LAST NORMAL: \"When was the last time you were normal (no symptoms)? \"     1 month     4. PATTERN \"Does this come and go, or has it been constant since it started? \"  \"Is it present now? \"     Intermittent,  Its present now     5. CARDIAC SYMPTOMS: \"Have you had any of the following symptoms: chest pain, difficulty breathing, palpitations? \"     No chest pain. Pt reports  Trouble breathing, feeling  Wiped out  She has been using her sons inhaler       6.  NEUROLOGIC SYMPTOMS: \"Have you had any of the following symptoms: headache, dizziness, vision loss, double vision, changes in speech, unsteady on your feet? \"      Headaches and tingling in head, back spasms , leg and finger spasms, blurry vision , hard to walk using a wheel chair       7. OTHER SYMPTOMS: \"Do you have any other symptoms? \"     History of ms , pt not able to walk like she use to  [place] ,    8. PREGNANCY: \"Is there any chance you are pregnant? \" \"When was your last menstrual period? \"     LMP- 3 wks ago    Protocols used: NEUROLOGIC DEFICIT-ADULT-[place]

## 2021-11-02 LAB — THROMBOPHILIA DNA ASSAY: NORMAL

## 2021-11-19 ENCOUNTER — HOSPITAL ENCOUNTER (EMERGENCY)
Age: 41
Discharge: HOME | End: 2021-11-19
Payer: COMMERCIAL

## 2021-11-19 ENCOUNTER — APPOINTMENT (OUTPATIENT)
Dept: GENERAL RADIOLOGY | Age: 41
End: 2021-11-19
Payer: COMMERCIAL

## 2021-11-19 VITALS
BODY MASS INDEX: 39.53 KG/M2 | DIASTOLIC BLOOD PRESSURE: 76 MMHG | OXYGEN SATURATION: 99 % | RESPIRATION RATE: 16 BRPM | WEIGHT: 260 LBS | SYSTOLIC BLOOD PRESSURE: 126 MMHG | TEMPERATURE: 98.3 F | HEART RATE: 72 BPM

## 2021-11-19 DIAGNOSIS — R05.9 COUGH: Primary | ICD-10-CM

## 2021-11-19 LAB
ALBUMIN SERPL-MCNC: 3.8 G/DL (ref 3.5–5.2)
ALP BLD-CCNC: 82 U/L (ref 35–104)
ALT SERPL-CCNC: 8 U/L (ref 0–32)
ANION GAP SERPL CALCULATED.3IONS-SCNC: 11 MMOL/L (ref 7–16)
ANISOCYTOSIS: ABNORMAL
AST SERPL-CCNC: 13 U/L (ref 0–31)
BASOPHILS ABSOLUTE: 0 E9/L (ref 0–0.2)
BASOPHILS RELATIVE PERCENT: 0.4 % (ref 0–2)
BILIRUB SERPL-MCNC: 0.3 MG/DL (ref 0–1.2)
BUN BLDV-MCNC: 8 MG/DL (ref 6–20)
CALCIUM SERPL-MCNC: 9.1 MG/DL (ref 8.6–10.2)
CHLORIDE BLD-SCNC: 107 MMOL/L (ref 98–107)
CO2: 22 MMOL/L (ref 22–29)
CREAT SERPL-MCNC: 0.7 MG/DL (ref 0.5–1)
EOSINOPHILS ABSOLUTE: 0.57 E9/L (ref 0.05–0.5)
EOSINOPHILS RELATIVE PERCENT: 5.3 % (ref 0–6)
GFR AFRICAN AMERICAN: >60
GFR NON-AFRICAN AMERICAN: >60 ML/MIN/1.73
GLUCOSE BLD-MCNC: 100 MG/DL (ref 74–99)
HCT VFR BLD CALC: 37 % (ref 34–48)
HEMOGLOBIN: 10.4 G/DL (ref 11.5–15.5)
HYPOCHROMIA: ABNORMAL
LYMPHOCYTES ABSOLUTE: 0.54 E9/L (ref 1.5–4)
LYMPHOCYTES RELATIVE PERCENT: 5.3 % (ref 20–42)
MCH RBC QN AUTO: 21.9 PG (ref 26–35)
MCHC RBC AUTO-ENTMCNC: 28.1 % (ref 32–34.5)
MCV RBC AUTO: 77.9 FL (ref 80–99.9)
MONOCYTES ABSOLUTE: 0.32 E9/L (ref 0.1–0.95)
MONOCYTES RELATIVE PERCENT: 2.6 % (ref 2–12)
NEUTROPHILS ABSOLUTE: 9.31 E9/L (ref 1.8–7.3)
NEUTROPHILS RELATIVE PERCENT: 86.8 % (ref 43–80)
OVALOCYTES: ABNORMAL
PDW BLD-RTO: 26.8 FL (ref 11.5–15)
PLATELET # BLD: 204 E9/L (ref 130–450)
PMV BLD AUTO: ABNORMAL FL (ref 7–12)
POIKILOCYTES: ABNORMAL
POLYCHROMASIA: ABNORMAL
POTASSIUM REFLEX MAGNESIUM: 4 MMOL/L (ref 3.5–5)
RBC # BLD: 4.75 E12/L (ref 3.5–5.5)
SODIUM BLD-SCNC: 140 MMOL/L (ref 132–146)
TOTAL PROTEIN: 6.7 G/DL (ref 6.4–8.3)
WBC # BLD: 10.7 E9/L (ref 4.5–11.5)

## 2021-11-19 PROCEDURE — 85025 COMPLETE CBC W/AUTO DIFF WBC: CPT

## 2021-11-19 PROCEDURE — 80053 COMPREHEN METABOLIC PANEL: CPT

## 2021-11-19 PROCEDURE — 71046 X-RAY EXAM CHEST 2 VIEWS: CPT

## 2021-11-19 PROCEDURE — 99283 EMERGENCY DEPT VISIT LOW MDM: CPT

## 2021-11-19 RX ORDER — ALBUTEROL SULFATE 90 UG/1
2 AEROSOL, METERED RESPIRATORY (INHALATION) 4 TIMES DAILY PRN
Qty: 18 G | Refills: 0 | Status: SHIPPED | OUTPATIENT
Start: 2021-11-19 | End: 2022-08-10

## 2021-11-19 RX ORDER — OFATUMUMAB 20 MG/.4ML
20 INJECTION, SOLUTION SUBCUTANEOUS
COMMUNITY
End: 2022-01-06

## 2021-11-19 RX ORDER — BENZONATATE 100 MG/1
100 CAPSULE ORAL 3 TIMES DAILY PRN
Qty: 12 CAPSULE | Refills: 0 | Status: SHIPPED | OUTPATIENT
Start: 2021-11-19 | End: 2021-11-23

## 2021-11-19 NOTE — ED PROVIDER NOTES
[facility]  Department of Emergency Medicine   ED  Encounter Note  Admit Date/RoomTime: 2021 11:47 AM  ED Room: [facility]    NAME: Laurence Rucker  : 1980  MRN: [id number]     Chief Complaint:  Cough (green mucous, with sinus congestion, denies fever, ongoing for 1 mo)    History of Present Illness        Laurence Rucker is a 45 y.o. old female who presents to the emergency department by private vehicle, with sudden onset, persistent congested cough which began 1 month(s) prior to arrival.  The symptoms are associated with nasal congestion and runny nose and there has been no abdominal pain, decreased appetite, chest tightness, conjunctivitis, watery eyes, nausea, vomiting, diarrhea, dizziness, dysuria, urinary frequency, earache, ear pulling, fever, fatigue, headache, hoarseness, irritability, joint swelling, malaise, muscle aches, neck stiffness, rash, sneezing, sore throat, scratchy throat, swollen glands, wheezing, loss of taste, loss of smell or shortness of breath, chest pain or weakness. She has prior history of no prior history of pneumonia or bronchitis in the past.  Since onset the symptoms have been remaining constant and mild-moderate in severity. The symptoms are aggravated by nothing in particular and relieved by nothing in particular. The patient has not received any COVID-19 vaccine. Patient states she went to her PCP when this first started and was given Claritin and Flonase with no relief. ROS   Pertinent positives and negatives are stated within HPI, all other systems reviewed and are negative. Past Medical History:  has a past medical history of Hypertension, Morbid (severe) obesity, and Numbness and tingling of right lower extremity. Surgical History:  has a past surgical history that includes Tubal ligation; Tympanostomy tube placement; and Cholecystectomy. Social History:  reports that she quit smoking about 3 years ago. Her smoking use included cigarettes. She smoked 0.50 packs per day. She has never used smokeless tobacco. She reports previous alcohol use. She reports current drug use. Drug: Marijuana [place] Zoran). Family History: family history includes Heart Attack in her father; Heart Disease in her mother; High Blood Pressure in her mother. Allergies: Patient has no known allergies. Physical Exam   Oxygen Saturation Interpretation: Normal on room air analysis. ED Triage Vitals   BP Temp Temp Source Pulse Resp SpO2 Height Weight   11/19/21 1136 11/19/21 1129 11/19/21 1129 11/19/21 1129 11/19/21 1136 11/19/21 1129 -- 11/19/21 1136   (!) 148/92 98.3 °F (36.8 °C) Oral 87 17 94 %  260 lb (117.9 kg)         Constitutional:  Alert, development consistent with age. HEENT:  NC/NT. Airway patent. normal TM's and external ear canals bilaterally. Neck:  Normal ROM. Supple. Respiratory:  Breath sounds: equal bilaterally. Lung sounds: normal.   CV:  Regular rate and rhythm, normal heart sounds, without pathological murmurs, ectopy, gallops, or rubs. .  GI:  Abdomen Soft, nontender, good bowel sounds. No firm or pulsatile mass. Integument:  Normal turgor. Warm, dry, without visible rash. Lymphatic:  Edema:  none Bilateral lower extremity(s). Neurological:  Oriented. Motor functions intact.     Lab / Imaging Results   (All laboratory and radiology results have been personally reviewed by myself)  Labs:  Results for orders placed or performed during the hospital encounter of 11/19/21   CBC Auto Differential   Result Value Ref Range    WBC 10.7 4.5 - 11.5 E9/L    RBC 4.75 3.50 - 5.50 E12/L    Hemoglobin 10.4 (L) 11.5 - 15.5 g/dL    Hematocrit 37.0 34.0 - 48.0 %    MCV 77.9 (L) 80.0 - 99.9 fL    MCH 21.9 (L) 26.0 - 35.0 pg    MCHC 28.1 (L) 32.0 - 34.5 %    RDW 26.8 (H) 11.5 - 15.0 fL    Platelets 738 890 - 397 E9/L    MPV NOT CALC 7.0 - 12.0 fL    Neutrophils % 86.8 (H) 43.0 - 80.0 %    Lymphocytes % 5.3 (L) 20.0 - 42.0 %    Monocytes % 2.6 2.0 - 12.0 %    Eosinophils % 5.3 0.0 - 6.0 %    Basophils % 0.4 0.0 - 2.0 %    Neutrophils Absolute 9.31 (H) 1.80 - 7.30 E9/L    Lymphocytes Absolute 0.54 (L) 1.50 - 4.00 E9/L    Monocytes Absolute 0.32 0.10 - 0.95 E9/L    Eosinophils Absolute 0.57 (H) 0.05 - 0.50 E9/L    Basophils Absolute 0.00 0.00 - 0.20 E9/L    Anisocytosis 1+     Polychromasia 2+     Hypochromia 1+     Poikilocytes 1+     Ovalocytes 1+    Comprehensive Metabolic Panel w/ Reflex to MG   Result Value Ref Range    Sodium 140 132 - 146 mmol/L    Potassium reflex Magnesium 4.0 3.5 - 5.0 mmol/L    Chloride 107 98 - 107 mmol/L    CO2 22 22 - 29 mmol/L    Anion Gap 11 7 - 16 mmol/L    Glucose 100 (H) 74 - 99 mg/dL    BUN 8 6 - 20 mg/dL    CREATININE 0.7 0.5 - 1.0 mg/dL    GFR Non-African American >60 >=60 mL/min/1.73    GFR African American >60     Calcium 9.1 8.6 - 10.2 mg/dL    Total Protein 6.7 6.4 - 8.3 g/dL    Albumin 3.8 3.5 - 5.2 g/dL    Total Bilirubin 0.3 0.0 - 1.2 mg/dL    Alkaline Phosphatase 82 35 - 104 U/L    ALT 8 0 - 32 U/L    AST 13 0 - 31 U/L     Imaging: All Radiology results interpreted by Radiologist unless otherwise noted. XR CHEST (2 VW)   Final Result   No acute process. ED Course / Medical Decision Making   Medications - No data to display     Consult(s):   None    Procedure(s):   none    Medical Decision Making:   Patient presents to the ED for a cough that is been ongoing for a month. Differential diagnoses included but not limited to cough versus pneumonia. Workup in the ED revealed chest x-ray revealed no acute processes. CBC revealed a hemoglobin of 10.4 which is been greater than patient's previous hemoglobins. Platelet count was normal.  CMP was unremarkable. She denies any shortness breath, chest pain, back pain, lightness or dizziness. She not hypoxic or tachycardic. Patient discharged home with albuterol inhaler and Tessalon Perles.   Discussed appropriate use and potnetial side effects of starting these medications. She states verbal understanding. Patient continues to be non-toxic on re-evaluation. Findings were discussed with the patient and reasons to immediately return to the ED were articulated to them. They will follow-up with their PMD.      Assessment      1. Cough      Plan   Discharged home. Patient condition is stable    New Medications     Discharge Medication List as of 11/19/2021  2:30 PM      START taking these medications    Details   albuterol sulfate HFA (VENTOLIN HFA) 108 (90 Base) MCG/ACT inhaler Inhale 2 puffs into the lungs 4 times daily as needed for Wheezing, Disp-18 g, R-0Print      benzonatate (TESSALON) 100 MG capsule Take 1 capsule by mouth 3 times daily as needed for Cough, Disp-12 capsule, R-0Print           Electronically signed by RAUL Obrien CNP   DD: 11/19/21  **This report was transcribed using voice recognition software. Every effort was made to ensure accuracy; however, inadvertent computerized transcription errors may be present.   END OF ED PROVIDER NOTE        RAUL Obrien CNP  11/19/21 1108

## 2021-11-19 NOTE — ED TRIAGE NOTES
FIRST PROVIDER CONTACT ASSESSMENT NOTE           Department of Emergency Medicine                 First Provider Note            21  11:35 AM EST    Date of Encounter: No admission date for patient encounter. Patient Name: Concetta Bronson  : 1980  MRN: [id number]    Chief Complaint: Cough (green mucous, with sinus congestion, denies fever, ongoing for 1 mo)      History of Present Illness:   Concetta Bronson is a 45 y.o. female who presents to the ED for cough (green phlegm) x several weeks. Reports she has been using her son's inhaler with minimal relief of symptoms but has been having to use it more. Afebrile. Focused Physical Exam:  VS:    ED Triage Vitals [21 1129]   BP Temp Temp Source Pulse Resp SpO2 Height Weight   -- 98.3 °F (36.8 °C) Oral 87 -- 94 % -- --        Physical Ex: Constitutional: Alert and non-toxic. Medical History:  has a past medical history of Hypertension, Morbid (severe) obesity, and Numbness and tingling of right lower extremity. Surgical History:  has a past surgical history that includes Tubal ligation; Tympanostomy tube placement; and Cholecystectomy. Social History:  reports that she quit smoking about 3 years ago. Her smoking use included cigarettes. She smoked 0.50 packs per day. She has never used smokeless tobacco. She reports previous alcohol use. She reports current drug use. Drug: Marijuana [place] Blow). Family History: family history includes Heart Attack in her father; Heart Disease in her mother; High Blood Pressure in her mother. Allergies: Patient has no known allergies.      Initial Plan of Care: Initiate Treatment-Testing, Proceed toTreatment Area When Bed Available for ED Attending/MLP to Continue Care      ---END OF FIRST PROVIDER CONTACT ASSESSMENT NOTE---  Electronically signed by BARBARA Lazar   DD: 21

## 2021-12-03 ENCOUNTER — HOSPITAL ENCOUNTER (EMERGENCY)
Age: 41
Discharge: HOME | End: 2021-12-03
Attending: EMERGENCY MEDICINE
Payer: COMMERCIAL

## 2021-12-03 ENCOUNTER — APPOINTMENT (OUTPATIENT)
Dept: GENERAL RADIOLOGY | Age: 41
End: 2021-12-03
Payer: COMMERCIAL

## 2021-12-03 ENCOUNTER — APPOINTMENT (OUTPATIENT)
Dept: CT IMAGING | Age: 41
End: 2021-12-03
Payer: COMMERCIAL

## 2021-12-03 VITALS
OXYGEN SATURATION: 98 % | BODY MASS INDEX: 38.19 KG/M2 | WEIGHT: 252 LBS | SYSTOLIC BLOOD PRESSURE: 137 MMHG | TEMPERATURE: 98.1 F | RESPIRATION RATE: 14 BRPM | DIASTOLIC BLOOD PRESSURE: 85 MMHG | HEIGHT: 68 IN | HEART RATE: 74 BPM

## 2021-12-03 DIAGNOSIS — T50.905A ADVERSE EFFECT OF DRUG, INITIAL ENCOUNTER: Primary | ICD-10-CM

## 2021-12-03 LAB
ANION GAP SERPL CALCULATED.3IONS-SCNC: 10 MMOL/L (ref 7–16)
BUN BLDV-MCNC: 7 MG/DL (ref 6–20)
CALCIUM SERPL-MCNC: 9.6 MG/DL (ref 8.6–10.2)
CHLORIDE BLD-SCNC: 104 MMOL/L (ref 98–107)
CO2: 25 MMOL/L (ref 22–29)
CREAT SERPL-MCNC: 0.7 MG/DL (ref 0.5–1)
D DIMER: <200 NG/ML DDU
EKG ATRIAL RATE: 75 BPM
EKG P AXIS: 67 DEGREES
EKG P-R INTERVAL: 136 MS
EKG Q-T INTERVAL: 424 MS
EKG QRS DURATION: 88 MS
EKG QTC CALCULATION (BAZETT): 473 MS
EKG R AXIS: 86 DEGREES
EKG T AXIS: 59 DEGREES
EKG VENTRICULAR RATE: 75 BPM
GFR AFRICAN AMERICAN: >60
GFR NON-AFRICAN AMERICAN: >60 ML/MIN/1.73
GLUCOSE BLD-MCNC: 102 MG/DL (ref 74–99)
HCT VFR BLD CALC: 36.6 % (ref 34–48)
HEMOGLOBIN: 10.7 G/DL (ref 11.5–15.5)
MCH RBC QN AUTO: 22.9 PG (ref 26–35)
MCHC RBC AUTO-ENTMCNC: 29.2 % (ref 32–34.5)
MCV RBC AUTO: 78.4 FL (ref 80–99.9)
PDW BLD-RTO: 24.7 FL (ref 11.5–15)
PLATELET # BLD: 211 E9/L (ref 130–450)
PMV BLD AUTO: ABNORMAL FL (ref 7–12)
POTASSIUM SERPL-SCNC: 3.7 MMOL/L (ref 3.5–5)
RBC # BLD: 4.67 E12/L (ref 3.5–5.5)
SARS-COV-2, NAAT: NOT DETECTED
SODIUM BLD-SCNC: 139 MMOL/L (ref 132–146)
TROPONIN, HIGH SENSITIVITY: <6 NG/L (ref 0–9)
WBC # BLD: 11.5 E9/L (ref 4.5–11.5)

## 2021-12-03 PROCEDURE — 87635 SARS-COV-2 COVID-19 AMP PRB: CPT

## 2021-12-03 PROCEDURE — 6360000004 HC RX CONTRAST MEDICATION: Performed by: RADIOLOGY

## 2021-12-03 PROCEDURE — 36415 COLL VENOUS BLD VENIPUNCTURE: CPT

## 2021-12-03 PROCEDURE — 80048 BASIC METABOLIC PNL TOTAL CA: CPT

## 2021-12-03 PROCEDURE — 93005 ELECTROCARDIOGRAM TRACING: CPT | Performed by: PHYSICIAN ASSISTANT

## 2021-12-03 PROCEDURE — 99284 EMERGENCY DEPT VISIT MOD MDM: CPT

## 2021-12-03 PROCEDURE — 85378 FIBRIN DEGRADE SEMIQUANT: CPT

## 2021-12-03 PROCEDURE — 85027 COMPLETE CBC AUTOMATED: CPT

## 2021-12-03 PROCEDURE — 84484 ASSAY OF TROPONIN QUANT: CPT

## 2021-12-03 PROCEDURE — 71275 CT ANGIOGRAPHY CHEST: CPT

## 2021-12-03 PROCEDURE — 71046 X-RAY EXAM CHEST 2 VIEWS: CPT

## 2021-12-03 PROCEDURE — 93010 ELECTROCARDIOGRAM REPORT: CPT | Performed by: INTERNAL MEDICINE

## 2021-12-03 RX ADMIN — IOPAMIDOL 75 ML: 755 INJECTION, SOLUTION INTRAVENOUS at 14:35

## 2021-12-03 ASSESSMENT — PAIN SCALES - GENERAL: PAINLEVEL_OUTOF10: 5

## 2021-12-03 ASSESSMENT — PAIN DESCRIPTION - PAIN TYPE: TYPE: ACUTE PAIN

## 2021-12-03 NOTE — ED PROVIDER NOTES
ED Attending Shared Visit: CC: [id number]  Department of Emergency Medicine   ED  Encounter Note  Admit Date/RoomTime: 12/3/2021  8:04 AM  ED Room: 36/36    NAME: Dionte Richardson  : 1980  MRN: [id number]     Chief Complaint:  Cough (Patient states she has a cough and upper body hurts generalized. States her hair is falling out and had to shave it yesterday . Patient states she does have MS states started new MS drug that causes hair loss per patient )    [address] [place] [address]        Dionte Richardson is a 37 y.o. female with PMH significant for MS and Iron deficiency anemiawho presents to the ED by private vehicle for persistent bodywide pains and cough since mid October. She recently started taking Denny Sierra for MS and has had 3 injections so far. She is followed by Dr Ulises Terrell from neurology. Last night she states her hair fell out. She has had no vomiting or diarrhea. She does note that as of lately she has lost her sense of smell and taste. She has been vaccinated for Covid back in May. She has not been tested at any of her previous ER visits. ROS   Pertinent positives and negatives are stated within HPI, all other systems reviewed and are negative. Past Medical History:  has a past medical history of Hypertension, Morbid (severe) obesity, Multiple sclerosis ([address] and Numbness and tingling of right lower extremity. Surgical History:  has a past surgical history that includes Tubal ligation; Tympanostomy tube placement; and Cholecystectomy. Social History:  reports that she quit smoking about 4 years ago. Her smoking use included cigarettes. She smoked 0.50 packs per day. She has never used smokeless tobacco. She reports previous alcohol use. She reports current drug use. Drug: Marijuana [place] Zoran). Family History: family history includes Heart Attack in her father; Heart Disease in her mother; High Blood Pressure in her mother.      Allergies: Patient has no known allergies. PHYSICAL EXAM   Oxygen Saturation Interpretation: Normal on room air analysis. ED Triage Vitals   BP Temp Temp src Pulse Resp SpO2 Height Weight   12/03/21 0759 12/03/21 0751 -- 12/03/21 0751 -- 12/03/21 0751 12/03/21 0759 12/03/21 0759   128/83 98.1 °F (36.7 °C)  85  96 % 5' 8\" (1.727 m) 252 lb (114.3 kg)         Physical Exam  Constitutional/General: Alert and oriented x3, well appearing, non toxic. HEENT:  NC/NT. PERRLA,  Airway patent. Neck: Supple, full ROM, non tender to palpation in the midline, no stridor, no crepitus, no meningeal signs  Respiratory: Lungs clear to auscultation bilaterally, no wheezes, rales, or rhonchi. Not in respiratory distress  CV:  Regular rate. Regular rhythm. No murmurs, gallops, or rubs. 2+ distal pulses  Chest: No chest wall tenderness  GI:  Abdomen Soft, Non tender, Non distended. +BS. No rebound, guarding, or rigidity. No pulsatile masses. Musculoskeletal: Moves all extremities x 4. Warm and well perfused, no clubbing, cyanosis, or edema. Capillary refill <3 seconds  Integument: skin warm and dry. No rashes.    Lymphatic: no lymphadenopathy noted  Neurologic: GCS 15, no focal deficits, symmetric strength 5/5 in the upper and lower extremities bilaterally  Psychiatric: Normal Affect      Lab / Imaging Results   (All laboratory and radiology results have been personally reviewed by myself)  Labs:  Results for orders placed or performed during the hospital encounter of 12/03/21   COVID-19, Rapid    Specimen: Nasopharyngeal Swab; Nasal   Result Value Ref Range    SARS-CoV-2, NAAT Not Detected Not Detected   Basic metabolic panel   Result Value Ref Range    Sodium 139 132 - 146 mmol/L    Potassium 3.7 3.5 - 5.0 mmol/L    Chloride 104 98 - 107 mmol/L    CO2 25 22 - 29 mmol/L    Anion Gap 10 7 - 16 mmol/L    Glucose 102 (H) 74 - 99 mg/dL    BUN 7 6 - 20 mg/dL    CREATININE 0.7 0.5 - 1.0 mg/dL    GFR Non-African American >60 >=60 mL/min/1.73    GFR African American >60     Calcium 9.6 8.6 - 10.2 mg/dL   CBC   Result Value Ref Range    WBC 11.5 4.5 - 11.5 E9/L    RBC 4.67 3.50 - 5.50 E12/L    Hemoglobin 10.7 (L) 11.5 - 15.5 g/dL    Hematocrit 36.6 34.0 - 48.0 %    MCV 78.4 (L) 80.0 - 99.9 fL    MCH 22.9 (L) 26.0 - 35.0 pg    MCHC 29.2 (L) 32.0 - 34.5 %    RDW 24.7 (H) 11.5 - 15.0 fL    Platelets 265 063 - 950 E9/L    MPV NOT CALC 7.0 - 12.0 fL   Troponin I   Result Value Ref Range    Troponin, High Sensitivity <6 0 - 9 ng/L   D-Dimer, Quantitative   Result Value Ref Range    D-Dimer, Quant <200 ng/mL DDU   EKG 12 Lead   Result Value Ref Range    Ventricular Rate 75 BPM    Atrial Rate 75 BPM    P-R Interval 136 ms    QRS Duration 88 ms    Q-T Interval 424 ms    QTc Calculation (Bazett) 473 ms    P Axis 67 degrees    R Axis 86 degrees    T Axis 59 degrees     Imaging: All Radiology results interpreted by Radiologist unless otherwise noted. CTA PULMONARY W CONTRAST   Final Result   Limited evaluation of the distal most pulmonary arterial branches, otherwise   no evidence of pulmonary embolism or acute pulmonary abnormality. XR CHEST (2 VW)   Final Result   No radiographic evidence of acute cardiopulmonary disease process           EKG #1:  Interpreted by emergency department attending physician unless otherwise noted. 12/3/21  Time: 0828    Rhythm: normal sinus   Rate: normal  Axis: normal  Conduction: normal  ST Segments: no acute change  T Waves: non specific changes    Clinical Impression: non-specific EKG  Comparison to Prior tracings:  Today's ECG is slightly changed from previous tracings.     ED Course / Medical Decision Making     Medications   iopamidol (ISOVUE-370) 76 % injection 75 mL (75 mLs IntraVENous Given 12/3/21 1435)          Consultations:             Case discussed with Dr. Mirtha Clark who will make notification of possible adverse reaction to MS meds and recommends that she contact him to arrange f/u and med change. MDM:  Poss adverse reaction to MS meds. No evidence of PE,  All tests neg. Not hypoxic. Stable for d/c    Plan of Care/Counseling:  Henrry Costa reviewed today's visit with the patient in addition to providing specific details for the plan of care and counseling regarding the diagnosis and prognosis. Questions are answered at this time and are agreeable with the plan. ASSESSMENT     1. Adverse effect of drug, initial encounter      This patient's ED course included: a personal history and physicial examination, re-evaluation prior to disposition and multiple bedside re-evaluations  This patient has remained hemodynamically stable during their ED course. PLAN   Discharged home. Patient condition is good. New Medications     Discharge Medication List as of 12/3/2021  3:04 PM        Electronically signed by BARBARA Costa   DD: 12/3/21  **This report was transcribed using voice recognition software. Every effort was made to ensure accuracy; however, inadvertent computerized transcription errors may be present.   END OF PROVIDER NOTE       BARBARA Red  12/09/21 7653

## 2021-12-03 NOTE — ED NOTES
Pt ambulated in hallway, pulse ox remained 99% but patient was very winded with increased coughing     [facility] GOOD Lutheran, RN  12/03/21 8833

## 2021-12-03 NOTE — ED NOTES
Pty arrives via wheelchair, from triage. Pt states since October she has been in and out of the ER for SOB, chest pain and cough. Pt states now chest pain is worse. It is midsternal and radiates to the back. Cough is productive at times with green/yellow/brown sputum. States she has been on a few courses of antibiotics without relief. [facility] and monitor shows SR. Shu Kerr, [facility]  12/03/21 9436

## 2021-12-03 NOTE — ED NOTES
Pt resting comfortably, VSS, continue to await CT     [facility] KENY BLEVINS, RN  12/03/21 1358

## 2021-12-03 NOTE — ED NOTES
Pt dischraged to home with .   Copy of CT sent home with patient to take to her doctors appointment     [facility] KENY BLEVINS, YASMIN  12/03/21 3643

## 2021-12-03 NOTE — ED TRIAGE NOTES
Radiology Procedure Waiver   Name: Linda Yoder  : 1980  MRN: [id number]    Date:  12/3/21    Time: 12:48 PM EST    Benefits of immediately proceeding with Radiology exam(s) without pre-testing outweigh the risks or are not indicated as specified below and therefore the following is/are being waived:    [x] Pregnancy test   [] Patients LMP on-time and regular.   [] Patient had Tubal Ligation or has other Contraception Device. [] Patient  is Menopausal or Premenarcheal.    [] Patient had Full or Partial Hysterectomy. [] Protocol for Iodine allergy    [] MRI Questionnaire     [] BUN/Creatinine   [] Patient age w/no hx of renal dysfunction. [] Patient on Dialysis. [] Recent Normal Labs.   Electronically signed by BARBARA Bates on 12/3/21 at 12:48 PM EST

## 2021-12-03 NOTE — ED NOTES
Pt returned from [facility], [address] [place] [address]  12/03/21 1929

## 2022-01-06 ENCOUNTER — TELEPHONE (OUTPATIENT)
Dept: BARIATRICS/WEIGHT MGMT | Age: 42
End: 2022-01-06

## 2022-01-06 ENCOUNTER — OFFICE VISIT (OUTPATIENT)
Dept: BARIATRICS/WEIGHT MGMT | Age: 42
End: 2022-01-06
Payer: COMMERCIAL

## 2022-01-06 VITALS
WEIGHT: 266 LBS | HEART RATE: 70 BPM | BODY MASS INDEX: 40.32 KG/M2 | SYSTOLIC BLOOD PRESSURE: 149 MMHG | RESPIRATION RATE: 20 BRPM | TEMPERATURE: 98.1 F | HEIGHT: 68 IN | DIASTOLIC BLOOD PRESSURE: 93 MMHG

## 2022-01-06 DIAGNOSIS — K21.9 GASTROESOPHAGEAL REFLUX DISEASE WITHOUT ESOPHAGITIS: ICD-10-CM

## 2022-01-06 DIAGNOSIS — E66.01 MORBID OBESITY DUE TO EXCESS CALORIES (HCC): Primary | ICD-10-CM

## 2022-01-06 PROCEDURE — G8484 FLU IMMUNIZE NO ADMIN: HCPCS | Performed by: SURGERY

## 2022-01-06 PROCEDURE — G8427 DOCREV CUR MEDS BY ELIG CLIN: HCPCS | Performed by: SURGERY

## 2022-01-06 PROCEDURE — 99212 OFFICE O/P EST SF 10 MIN: CPT

## 2022-01-06 PROCEDURE — 1036F TOBACCO NON-USER: CPT | Performed by: SURGERY

## 2022-01-06 PROCEDURE — G8417 CALC BMI ABV UP PARAM F/U: HCPCS | Performed by: SURGERY

## 2022-01-06 PROCEDURE — 99214 OFFICE O/P EST MOD 30 MIN: CPT | Performed by: SURGERY

## 2022-01-06 RX ORDER — SODIUM CHLORIDE, SODIUM LACTATE, POTASSIUM CHLORIDE, CALCIUM CHLORIDE 600; 310; 30; 20 MG/100ML; MG/100ML; MG/100ML; MG/100ML
INJECTION, SOLUTION INTRAVENOUS CONTINUOUS
Status: CANCELLED | OUTPATIENT
Start: 2022-01-06

## 2022-01-06 NOTE — PROGRESS NOTES
Junior Pyle  1/6/2022  [facility]    History and Physical   EGD       CHIEF COMPLAINT: Morbid obesity, malnutrition, Hypertension, acid reflux on Rolaids. HISTORY OF PRESENT ILLNESS: Junior Pyle is a morbidly-obese 37 y.o.  female, who weighs 266 lb (120.7 kg). She is 150 pounds over her ideal body weight. The Body mass index is 40.45 kg/m². She has multiple medical problems aggravated by her obesity, walks little but uses a wheel chair most of the time. She was seen last year but did not proceed, now she wishes to have bariatric surgery so that she can lose a large amount of weight and keep the weight off. I have met with [facility] in the [facility] where we discussed the surgery in great detail and went over the risks and benefits. She has watched our informational video so she understands all of the extensive risks involved. She states that she understands all of the risks and wishes to proceed with the evaluation. Weight:  266 lb 1/6/2022   289 lb 1/28/21    Patient Active Problem List   Diagnosis Code    Numbness and tingling of right lower extremity R20.0, R20.2    Multiple sclerosis ([address] G35    Morbid (severe) obesity E66.01    Hypertension I10     Past Surgical History:   Procedure Laterality Date    CHOLECYSTECTOMY      TUBAL LIGATION      TYMPANOSTOMY TUBE PLACEMENT        Allergies: Patient has no known allergies. Home Medications  Prior to Visit Medications    Medication Sig Taking?  Authorizing Provider   tiZANidine (ZANAFLEX) 4 MG tablet TAKE 2 TABLETS BY MOUTH 3 TIMES A DAY Yes RAUL Segura - CNP   albuterol sulfate HFA (VENTOLIN HFA) 108 (90 Base) MCG/ACT inhaler Inhale 2 puffs into the lungs 4 times daily as needed for Wheezing Yes RAUL Hubbard - CNP   [employer] D3 50 MCG (2000 UT) CAPS TAKE 1 CAPSULE BY MOUTH ONCE Yes Wendy Nunez MD   traZODone (DESYREL) 100 MG tablet Take 100 mg by mouth daily Yes Historical Provider, MD   ferrous sulfate 300 (60 Fe) MG/5ML syrup Take 300 mg by mouth daily Yes Historical Provider, MD   dantrolene (DANTRIUM) 25 MG capsule Take 1 capsule by mouth nightly Yes Alonzo Taylor MD   vitamin D (ERGOCALCIFEROL) 1.25 MG (05956 UT) CAPS capsule 1 capsule once a week  Yes Historical Provider, MD     Social History:   TOBACCO:   reports that she quit smoking about 4 years ago. Her smoking use included cigarettes. She smoked 0.50 packs per day. She has never used smokeless tobacco.  All smokers must join the free smoking cessation program and stop smoking for 3 months before having any Bariatric surgery. ETOH:    reports previous alcohol use. Family History   Problem Relation Age of Onset    Heart Disease Mother     High Blood Pressure Mother     Heart Attack Father      Review of Systems:  Psychiatric:  depression and anxiety  Respiratory: negative  Cardiovascular: negative  Gastrointestinal: negative  Musculoskeletal:negative  All others reviewed, negative    Physical Exam:   VITALS: Blood pressure (!) 149/93, pulse 70, temperature 98.1 °F (36.7 °C), temperature source Temporal, resp. rate 20, height 5' 8\" (1.727 m), weight 266 lb (120.7 kg). General appearance: alert, appears stated age and cooperative, does not ambulate easily, uses a wheel chair. Head: Normocephalic, without obvious abnormality, atraumatic  Eyes: PERRL  Ears/mouth/throat:  Ears clear, mouth normal, throat no redness  Neck: no adenopathy, no JVD, supple, symmetrical, trachea midline and thyroid not enlarged  Lungs: clear to auscultation bilaterally  Heart: regular rate and rhythm  Abdomen: soft, non-tender; bowel sounds normal; no masses,  no organomegaly  Extremities: extremities normal, atraumatic, no cyanosis or edema  Skin: no open wounds    Assessment:  Morbid obesity with inability to keep the weight off with diet and exercise.  She is interested in Laparoscopic Evonne-en- Y Gastric Bypass or Sleeve Gastrectomy. We discussed that our goal is to ameliorate the medical problems and not to obtain a specific body mass index. She understands the risks and benefits, and wishes to proceed with the evaluation. Plan:  Psych evaluation, mammogram, pap test, medical and cardiac clearance. These patients often have vitamin deficiencies so I will do screening labs for malnutrition. I will do an upper endoscopy to check for hiatal hernias, ulcers and H. Pylori while we wait for insurance approval for the surgery.     Physician Signature: Electronically signed by Dr. Nigel Holder MD    Send copy of H&P to PCP, Maximino Duncan, RAUL - CNP

## 2022-01-06 NOTE — PATIENT INSTRUCTIONS
What is the next step to proceed with weight loss surgery? Please be aware that any co-pays or deductibles may be requested prior to testing and / or procedures. You will need to schedule a psychological evaluation for weight loss surgery. Patients will be required to complete all psychological testing as required by the mental health provider. Patients must also follow all of the provider's recommendations before weight loss surgery can be scheduled. The evaluation must be done a standard way for weight loss surgery. We strongly recommend that you contact one of our preferred providers listed below to arrange this:      Jennifer Whatley, [facility]  [address] [place], [place]   [phone]    [facility] [address]   [phone]    Dr. Prudencio Mclaughlin, PhD    Matt Castillo. [place], [place]    [phone]      You will also need to plan on attending a 2 hour nutrition class at the [facility] prior to your surgery. We will schedule this for you when we schedule your surgery. Please remember to have your labs drawn 10 days prior to your first scheduled dietary appointment. Please remember, that while we will submit your case to insurance for surgery authorization, it is your responsibility to know if your plan covers weight loss surgery and keep up-to-date with changes to your insurance coverage. We will do everything possible to help you get approved for weight loss surgery, but cannot guarantee an approval.     Please note that you will not be submitted to your insurance company until all pre-operative testing requirements are met.

## 2022-01-25 ENCOUNTER — ANESTHESIA EVENT (OUTPATIENT)
Dept: ENDOSCOPY | Age: 42
End: 2022-01-25
Payer: COMMERCIAL

## 2022-01-25 ASSESSMENT — LIFESTYLE VARIABLES: SMOKING_STATUS: 0

## 2022-01-25 NOTE — PROGRESS NOTES
[address]                                                                                                                     PRE OP INSTRUCTIONS FOR  Jermain Olsen        Date: 1/25/2022    Date and time of surgery: 1/16/22  Arrival Time: [facility] will call with time between 4:30-5. 1. Do not eat or drink anything after 12 midnight prior to surgery. This includes no water, chewing gum, mints or ice chips. 2. Take the following pills with a small sip of water on the morning of Surgery: Bring inhaler      3. Diabetics may take evening dose of insulin but none after midnight. If you feel symptomatic or low blood sugar take 1-2 ounces of apple juice only. 4. Aspirin, Ibuprofen, Advil, Naproxen, Vitamin E and other Anti-inflammatory products should be stopped  before surgery  as directed by your physician. 5. Check with your Doctor regarding stopping Plavix, Coumadin, Lovenox, Fragmin or other blood thinners. 6. Do not smoke,use illicit drugs and do not drink any alcoholic beverages 24 hours prior to surgery. 7. You may brush your teeth and gargle the morning of surgery. DO NOT SWALLOW WATER    8. You MUST make arrangements for a responsible adult to take you home after your surgery. You will not be allowed to leave alone or drive yourself home. It is strongly suggested someone stay with you the first 24 hrs. Your surgery will be cancelled if you do not have a ride home. 9. A parent/legal guardian must accompany a child scheduled for surgery and plan to stay at the hospital until the child is discharged. Please do not bring other children with you. 10. Please wear simple, loose fitting clothing to the hospital.  Verla Smoke not bring valuables (money, credit cards, checkbooks, etc.) Do not wear any makeup (including no eye makeup) or nail polish on your fingers or toes. 11. DO NOT wear any jewelry or piercings on day of surgery. All body piercing jewelry must be removed. 12.  Shower the night before surgery with _X__Antibacterial soap ___Hibiclens. 15. Remember to bring Blood Bank bracelet to the hospital on the day of surgery. 14. If you have a Living Will and Durable Power of  for Healthcare, please bring in a copy. 15. If appropriate bring crutches, inspirex, etc... 12. Notify your Surgeon if you develop any illness between now and surgery time, cough, cold, fever, sore throat, nausea, vomiting, etc.  Please notify your surgeon if you experience dizziness, shortness of breath or blurred vision between now & the time of your surgery. 17. If you have ___dentures, they will be removed before going to the OR; we will provide you a container. If you wear ___contact lenses or _X__glasses, they will be removed; please bring a case for them. 18. To provide excellent care visitors will be limited to one in the room at any given time. 19. Please bring picture ID and insurance card. 20. Sleep apnea patients need to bring CPAP to hospital on day of surgery. 21. Visit our web site for additional information: [email or web address]    22. During flu season no children under the age of 15 are permitted in the hospital for the safety of all patients. 23. Other Come in through main lobby, go to information desk. Please call pre admission testing if you have any further questions.    [facility]     [address]

## 2022-01-25 NOTE — ANESTHESIA PRE PROCEDURE
Department of Anesthesiology  Preprocedure Note       Name:  Jenni Arcos   Age:  37 y.o.  :  1980                                          MRN:  [id number]         Date:  2022      Surgeon: Juliet Bowser):  Aliya Saravia MD    Procedure: Procedure(s):  EGD ESOPHAGOGASTRODUODENOSCOPY    Medications prior to admission:   Prior to Admission medications    Medication Sig Start Date End Date Taking?  Authorizing Provider   tiZANidine (ZANAFLEX) 4 MG tablet TAKE 2 TABLETS BY MOUTH 3 TIMES A DAY 12/10/21  Yes Heena Livingston APRN - CNP   albuterol sulfate HFA (VENTOLIN HFA) 108 (90 Base) MCG/ACT inhaler Inhale 2 puffs into the lungs 4 times daily as needed for Wheezing 21  Yes RAUL Barnard - CNP   [employer] D3 50 MCG (2000 UT) CAPS TAKE 1 CAPSULE BY MOUTH ONCE 21  Yes Can Haas MD   traZODone (DESYREL) 100 MG tablet Take 100 mg by mouth daily   Yes Historical Provider, MD   ferrous sulfate 300 (60 Fe) MG/5ML syrup Take 300 mg by mouth daily   Yes Historical Provider, MD   vitamin D (ERGOCALCIFEROL) 1.25 MG (50296 UT) CAPS capsule 1 capsule once a week  21  Yes Historical Provider, MD       Current medications:    Current Facility-Administered Medications   Medication Dose Route Frequency Provider Last Rate Last Admin    lactated ringers infusion   IntraVENous Continuous Aliya Saravia MD           Allergies:  No Known Allergies    Problem List:    Patient Active Problem List   Diagnosis Code    Numbness and tingling of right lower extremity R20.0, R20.2    Multiple sclerosis ([address] G35    Morbid (severe) obesity E66.01    Hypertension I10       Past Medical History:        Diagnosis Date    Hypertension     Morbid (severe) obesity     Morbid obesity due to excess calories ([address]     Multiple sclerosis ([address]     Numbness and tingling of right lower extremity        Past Surgical History:        Procedure Laterality Date    CHOLECYSTECTOMY      ENDOSCOPY, COLON, DIAGNOSTIC      TUBAL LIGATION      TYMPANOSTOMY TUBE PLACEMENT         Social History:    Social History     Tobacco Use    Smoking status: Former Smoker     Packs/day: 0.50     Types: Cigarettes     Quit date: 2017     Years since quittin.1    Smokeless tobacco: Never Used    Tobacco comment: only when socially drinking   Substance Use Topics    Alcohol use: Not Currently                                Counseling given: Not Answered  Comment: only when socially drinking      Vital Signs (Current):   Vitals:    22 0719   BP: (!) 159/98   Pulse: 66   Resp: 14   Temp: 97.5 °F (36.4 °C)   TempSrc: Temporal   SpO2: 100%   Weight: 271 lb 8 oz (123.2 kg)   Height: 5' 8\" (1.727 m)                                              BP Readings from Last 3 Encounters:   22 (!) 159/98   22 (!) 149/93   21 137/85       NPO Status:                                                                                 BMI:   Wt Readings from Last 3 Encounters:   22 271 lb 8 oz (123.2 kg)   22 266 lb (120.7 kg)   21 252 lb (114.3 kg)     Body mass index is 41.28 kg/m². CBC:   Lab Results   Component Value Date    WBC 9.9 2022    RBC 4.23 2022    HGB 9.8 2022    HCT 34.0 2022    MCV 80.4 2022    RDW 15.7 2022     2022       CMP:   Lab Results   Component Value Date     2022    K 4.1 2022    K 4.0 2021     2022    CO2 24 2022    BUN 13 2022    CREATININE 0.7 2022    GFRAA >60 2022    LABGLOM >60 2022    GLUCOSE 85 2022    PROT 6.7 2022    CALCIUM 8.4 2022    BILITOT <0.2 2022    ALKPHOS 92 2022    AST 13 2022    ALT 9 2022       POC Tests: No results for input(s): POCGLU, POCNA, POCK, POCCL, POCBUN, POCHEMO, POCHCT in the last 72 hours.     Coags:   Lab Results   Component Value Date    PROTIME 12.1 2020    INR 1.1 2020 HCG (If Applicable):   Lab Results   Component Value Date    PREGTESTUR NEGATIVE 01/26/2022        ABGs: No results found for: PHART, PO2ART, TQX7JQP, AGD0SRG, BEART, Q5LKYEKI     Type & Screen (If Applicable):  No results found for: LABABO, LABRH    Drug/Infectious Status (If Applicable):  No results found for: HIV, HEPCAB    COVID-19 Screening (If Applicable):   Lab Results   Component Value Date    COVID19 Not Detected 12/03/2021           Anesthesia Evaluation  Patient summary reviewed no history of anesthetic complications:   Airway: Mallampati: II  TM distance: >3 FB   Neck ROM: full  Mouth opening: > = 3 FB Dental: normal exam         Pulmonary: breath sounds clear to auscultation      (-) not a current smoker                           Cardiovascular:    (+) hypertension:,         Rhythm: regular  Rate: normal           Beta Blocker:  Not on Beta Blocker         Neuro/Psych:   (+) neuromuscular disease: multiple sclerosis,             GI/Hepatic/Renal:   (+) morbid obesity          Endo/Other: Negative Endo/Other ROS                    Abdominal:   (+) obese,           Vascular: negative vascular ROS. Other Findings:           Anesthesia Plan      MAC     ASA 3       Induction: intravenous. Anesthetic plan and risks discussed with patient. Plan discussed with CRNA. PAT Chart Review:  Chart reviewed per routine by Valeriy Huffman MD.  Above represents information available via shared medical record including previous anesthesia history, drug and allergy history. Confirmation of above and final plan per Day of Surgery (DOS) anesthesiologist.    DOS STAFF ADDENDUM:    Pt seen and examined, chart reviewed (including anesthesia, drug and allergy history). Anesthetic plan, risks, benefits, alternatives, and personnel involved discussed with patient. Patient verbalized an understanding and agrees to proceed. Plan discussed with care team members and agreed upon.     Valeriy Huffman, MD  Staff Anesthesiologist  7:46 AM    Ganesh Greer MD   1/26/2022

## 2022-01-26 ENCOUNTER — HOSPITAL ENCOUNTER (OUTPATIENT)
Age: 42
Setting detail: OUTPATIENT SURGERY
Discharge: HOME | End: 2022-01-26
Attending: SURGERY | Admitting: SURGERY
Payer: COMMERCIAL

## 2022-01-26 ENCOUNTER — ANESTHESIA (OUTPATIENT)
Dept: ENDOSCOPY | Age: 42
End: 2022-01-26
Payer: COMMERCIAL

## 2022-01-26 VITALS
OXYGEN SATURATION: 97 % | RESPIRATION RATE: 21 BRPM | SYSTOLIC BLOOD PRESSURE: 140 MMHG | DIASTOLIC BLOOD PRESSURE: 90 MMHG

## 2022-01-26 VITALS
HEIGHT: 68 IN | SYSTOLIC BLOOD PRESSURE: 142 MMHG | DIASTOLIC BLOOD PRESSURE: 90 MMHG | WEIGHT: 271.5 LBS | OXYGEN SATURATION: 97 % | RESPIRATION RATE: 18 BRPM | BODY MASS INDEX: 41.15 KG/M2 | HEART RATE: 64 BPM | TEMPERATURE: 98 F

## 2022-01-26 LAB
ALBUMIN SERPL-MCNC: 3.8 G/DL (ref 3.5–5.2)
ALP BLD-CCNC: 92 U/L (ref 35–104)
ALT SERPL-CCNC: 9 U/L (ref 0–32)
ANION GAP SERPL CALCULATED.3IONS-SCNC: 10 MMOL/L (ref 7–16)
AST SERPL-CCNC: 13 U/L (ref 0–31)
BILIRUB SERPL-MCNC: <0.2 MG/DL (ref 0–1.2)
BUN BLDV-MCNC: 13 MG/DL (ref 6–20)
CALCIUM SERPL-MCNC: 8.4 MG/DL (ref 8.6–10.2)
CHLORIDE BLD-SCNC: 107 MMOL/L (ref 98–107)
CHOLESTEROL, TOTAL: 202 MG/DL (ref 0–199)
CO2: 24 MMOL/L (ref 22–29)
CREAT SERPL-MCNC: 0.7 MG/DL (ref 0.5–1)
FERRITIN: 7 NG/ML
FOLATE: 16.4 NG/ML (ref 4.8–24.2)
GFR AFRICAN AMERICAN: >60
GFR NON-AFRICAN AMERICAN: >60 ML/MIN/1.73
GLUCOSE BLD-MCNC: 85 MG/DL (ref 74–99)
HBA1C MFR BLD: 5.2 % (ref 4–5.6)
HCG(URINE) PREGNANCY TEST: NEGATIVE
HCT VFR BLD CALC: 34 % (ref 34–48)
HEMOGLOBIN: 9.8 G/DL (ref 11.5–15.5)
MCH RBC QN AUTO: 23.2 PG (ref 26–35)
MCHC RBC AUTO-ENTMCNC: 28.8 % (ref 32–34.5)
MCV RBC AUTO: 80.4 FL (ref 80–99.9)
PDW BLD-RTO: 15.7 FL (ref 11.5–15)
PLATELET # BLD: 232 E9/L (ref 130–450)
PMV BLD AUTO: 11.7 FL (ref 7–12)
POTASSIUM SERPL-SCNC: 4.1 MMOL/L (ref 3.5–5)
RBC # BLD: 4.23 E12/L (ref 3.5–5.5)
SODIUM BLD-SCNC: 141 MMOL/L (ref 132–146)
TOTAL PROTEIN: 6.7 G/DL (ref 6.4–8.3)
TRIGL SERPL-MCNC: 162 MG/DL (ref 0–149)
VITAMIN B-12: 434 PG/ML (ref 211–946)
WBC # BLD: 9.9 E9/L (ref 4.5–11.5)

## 2022-01-26 PROCEDURE — 2580000003 HC RX 258: Performed by: NURSE ANESTHETIST, CERTIFIED REGISTERED

## 2022-01-26 PROCEDURE — 7100000010 HC PHASE II RECOVERY - FIRST 15 MIN: Performed by: SURGERY

## 2022-01-26 PROCEDURE — 82607 VITAMIN B-12: CPT

## 2022-01-26 PROCEDURE — 81025 URINE PREGNANCY TEST: CPT

## 2022-01-26 PROCEDURE — 84630 ASSAY OF ZINC: CPT

## 2022-01-26 PROCEDURE — 3609012400 HC EGD TRANSORAL BIOPSY SINGLE/MULTIPLE: Performed by: SURGERY

## 2022-01-26 PROCEDURE — 84425 ASSAY OF VITAMIN B-1: CPT

## 2022-01-26 PROCEDURE — 7100000011 HC PHASE II RECOVERY - ADDTL 15 MIN: Performed by: SURGERY

## 2022-01-26 PROCEDURE — 84478 ASSAY OF TRIGLYCERIDES: CPT

## 2022-01-26 PROCEDURE — 83036 HEMOGLOBIN GLYCOSYLATED A1C: CPT

## 2022-01-26 PROCEDURE — 82465 ASSAY BLD/SERUM CHOLESTEROL: CPT

## 2022-01-26 PROCEDURE — 82746 ASSAY OF FOLIC ACID SERUM: CPT

## 2022-01-26 PROCEDURE — 82728 ASSAY OF FERRITIN: CPT

## 2022-01-26 PROCEDURE — 36415 COLL VENOUS BLD VENIPUNCTURE: CPT

## 2022-01-26 PROCEDURE — 85027 COMPLETE CBC AUTOMATED: CPT

## 2022-01-26 PROCEDURE — 3700000000 HC ANESTHESIA ATTENDED CARE: Performed by: SURGERY

## 2022-01-26 PROCEDURE — 2709999900 HC NON-CHARGEABLE SUPPLY: Performed by: SURGERY

## 2022-01-26 PROCEDURE — 43239 EGD BIOPSY SINGLE/MULTIPLE: CPT | Performed by: SURGERY

## 2022-01-26 PROCEDURE — 87081 CULTURE SCREEN ONLY: CPT

## 2022-01-26 PROCEDURE — 6360000002 HC RX W HCPCS: Performed by: NURSE ANESTHETIST, CERTIFIED REGISTERED

## 2022-01-26 PROCEDURE — 80053 COMPREHEN METABOLIC PANEL: CPT

## 2022-01-26 RX ORDER — PROPOFOL 10 MG/ML
INJECTION, EMULSION INTRAVENOUS PRN
Status: DISCONTINUED | OUTPATIENT
Start: 2022-01-26 | End: 2022-01-26 | Stop reason: SDUPTHER

## 2022-01-26 RX ORDER — SODIUM CHLORIDE, SODIUM LACTATE, POTASSIUM CHLORIDE, CALCIUM CHLORIDE 600; 310; 30; 20 MG/100ML; MG/100ML; MG/100ML; MG/100ML
INJECTION, SOLUTION INTRAVENOUS CONTINUOUS PRN
Status: DISCONTINUED | OUTPATIENT
Start: 2022-01-26 | End: 2022-01-26 | Stop reason: SDUPTHER

## 2022-01-26 RX ORDER — SODIUM CHLORIDE, SODIUM LACTATE, POTASSIUM CHLORIDE, CALCIUM CHLORIDE 600; 310; 30; 20 MG/100ML; MG/100ML; MG/100ML; MG/100ML
INJECTION, SOLUTION INTRAVENOUS CONTINUOUS
Status: DISCONTINUED | OUTPATIENT
Start: 2022-01-26 | End: 2022-01-26 | Stop reason: HOSPADM

## 2022-01-26 RX ADMIN — SODIUM CHLORIDE, POTASSIUM CHLORIDE, SODIUM LACTATE AND CALCIUM CHLORIDE: 600; 310; 30; 20 INJECTION, SOLUTION INTRAVENOUS at 09:09

## 2022-01-26 RX ADMIN — PROPOFOL 230 MG: 10 INJECTION, EMULSION INTRAVENOUS at 09:12

## 2022-01-26 ASSESSMENT — PAIN SCALES - GENERAL: PAINLEVEL_OUTOF10: 0

## 2022-01-26 ASSESSMENT — PAIN - FUNCTIONAL ASSESSMENT: PAIN_FUNCTIONAL_ASSESSMENT: 0-10

## 2022-01-26 NOTE — OP NOTE
[address]    Operative Report      SURGEON: Dr. Louisa Draper MD    Surgical Assistant: Macie Perrin RN     PRE-OP DIAGNOSIS:     Multiple sclerosis [facility])     Morbid (severe) obesity     Hypertension     Acid reflux     POSTOPERATIVE DIAGNOSES:    EGD 1/26/2022 showed no esophagitis, no hiatal hernia, she does not complain of acid reflux, there was old food filling the stomach but no gastritis, biopsy done to check for H.pylori, and the gallbladder is out. OPERATION:    EGD esophagogastroduodenoscopy    with biopsy                 [id number]     ANESTHESIA: LMAC. BLOOD LOSS: none  SPECIMEN: gastric biopsy for SHERLY    CONSENT AND INDICATIONS:  Alyson Graham is a 37y.o. year old female who weighs 271 lb 8 oz (123.2 kg) who needs to have an EGD as part of the work up for bariatric surgery. I have discussed with the patient the indication, alternatives, and the possible risks and/or complications of the planned procedure and the anesthesia methods. The patient and/or patient representative appear to understand and agree to proceed. Monitoring and Safety: Anaesthesia monitored vital signs, BP cuff, pulse oximetry, cardiac monitor and level of consciousness until recovered. Operation: The patient was placed on the table and sedated by Anaesthesia. Bite block was placed. A lubricated scope was easily passed into the upper esophagus and distal esophagus which had no esophagitis. The Z line was measured at 36 cm. The scope was passed into the stomach and down toward the pylorus. The antral mucosa was examined and there was undigested food filling the stomach but no gastritis. Biopsy was taken to check for H. pylori. The scope was passed through the pylorus into the duodenal bulb and distal duodenum which looked normal. The scope was pulled back to the stomach and retroflexed. There was no hiatal hernia, picture was taken. The scope was withdrawn.  The

## 2022-01-26 NOTE — ANESTHESIA POSTPROCEDURE EVALUATION
Department of Anesthesiology  Postprocedure Note    Patient: Yue Silva  MRN: [id number]  YOB: 1980  Date of evaluation: 1/26/2022  Time:  10:37 AM     Procedure Summary     Date: 01/26/22 Room / Location: [address] [place] [address] 01 / [facility]    Anesthesia Start: 1900 Anesthesia Stop: 4756    Procedure: EGD BIOPSY (N/A ) Diagnosis: (GERD)    Surgeons: Billie Corrales MD Responsible Provider: Danay Chapa MD    Anesthesia Type: MAC ASA Status: 3          Anesthesia Type: MAC    Padmini Phase I: Padmini Score: 10    Padmini Phase II: Padmini Score: 10    Last vitals: Reviewed and per EMR flowsheets.        Anesthesia Post Evaluation    Patient location during evaluation: PACU  Patient participation: complete - patient participated  Level of consciousness: awake  Airway patency: patent  Nausea & Vomiting: no nausea and no vomiting  Complications: no  Cardiovascular status: hemodynamically stable  Respiratory status: acceptable  Hydration status: euvolemic

## 2022-01-26 NOTE — H&P
Domenica Esteves  1/26/2022  [facility]    History and Physical   EGD       CHIEF COMPLAINT: Morbid obesity, malnutrition, Hypertension, acid reflux on Rolaids. HISTORY OF PRESENT ILLNESS: Domenica Esteves is a morbidly-obese 37 y.o.  female, who weighs 271 lb 8 oz (123.2 kg). She is 150 pounds over her ideal body weight. The Body mass index is 41.28 kg/m². She has multiple medical problems aggravated by her obesity, walks little but uses a wheel chair most of the time. She was seen last year but did not proceed, now she wishes to have bariatric surgery so that she can lose weight and keep the weight off. I have met with her in the [facility] where we discussed the surgery in great detail and went over the risks and benefits. She has watched our informational video so she understands all of the extensive risks involved. She states that she understands all of the risks and wishes to proceed with the evaluation. Weight:  266 lb 1/6/2022   289 lb 1/28/21    Patient Active Problem List   Diagnosis Code    Numbness and tingling of right lower extremity R20.0, R20.2    Multiple sclerosis ([address] G35    Morbid (severe) obesity E66.01    Hypertension I10     Past Surgical History:   Procedure Laterality Date    CHOLECYSTECTOMY      ENDOSCOPY, COLON, DIAGNOSTIC      TUBAL LIGATION      TYMPANOSTOMY TUBE PLACEMENT        Allergies: Patient has no known allergies. Home Medications  Prior to Visit Medications    Medication Sig Taking?  Authorizing Provider   tiZANidine (ZANAFLEX) 4 MG tablet TAKE 2 TABLETS BY MOUTH 3 TIMES A DAY Yes RAUL James CNP   albuterol sulfate HFA (VENTOLIN HFA) 108 (90 Base) MCG/ACT inhaler Inhale 2 puffs into the lungs 4 times daily as needed for Wheezing Yes RAUL Blount CNP   [employer]S D3 50 MCG (2000 UT) CAPS TAKE 1 CAPSULE BY MOUTH ONCE Yes Kenneth Grigsby MD   traZODone (DESYREL) 100 MG tablet Take 100 mg by mouth daily Yes Historical Provider, MD   ferrous sulfate 300 (60 Fe) MG/5ML syrup Take 300 mg by mouth daily Yes Historical Provider, MD   vitamin D (ERGOCALCIFEROL) 1.25 MG (03847 UT) CAPS capsule 1 capsule once a week  Yes Historical Provider, MD     Social History:   TOBACCO:   reports that she quit smoking about 4 years ago. Her smoking use included cigarettes. She smoked 0.50 packs per day. She has never used smokeless tobacco.  All smokers must join the free smoking cessation program and stop smoking for 3 months before having any Bariatric surgery. ETOH:    reports previous alcohol use. Family History   Problem Relation Age of Onset    Heart Disease Mother     High Blood Pressure Mother     Heart Attack Father      Review of Systems:  Psychiatric:  depression and anxiety  Respiratory: negative  Cardiovascular: negative  Gastrointestinal: negative  Musculoskeletal:negative  All others reviewed, negative    Physical Exam:   VITALS: Blood pressure (!) 159/98, pulse 66, temperature 97.5 °F (36.4 °C), temperature source Temporal, resp. rate 14, height 5' 8\" (1.727 m), weight 271 lb 8 oz (123.2 kg), last menstrual period 01/14/2022, SpO2 100 %. General appearance: alert, appears stated age and cooperative, does not ambulate easily, uses a wheel chair.   Head: Normocephalic, without obvious abnormality, atraumatic  Eyes: PERRL  Ears/mouth/throat:  Ears clear, mouth normal, throat no redness  Neck: no adenopathy, no JVD, supple, symmetrical, trachea midline and thyroid not enlarged  Lungs: clear to auscultation bilaterally  Heart: regular rate and rhythm  Abdomen: soft, non-tender; bowel sounds normal; no masses,  no organomegaly  Extremities: extremities normal, atraumatic, no cyanosis or edema  Skin: no open wounds    Assessment:  Denies acid reflux    Plan:  EGD    Physician Signature: Electronically signed by Dr. Billie Corrales MD    Send copy of H&P to PCP, RAUL Waters - LETICIA

## 2022-01-27 LAB — CLOTEST: NORMAL

## 2022-01-28 LAB — ZINC: 88.9 UG/DL (ref 60–120)

## 2022-01-30 LAB — VITAMIN B1 WHOLE BLOOD: 148 NMOL/L (ref 70–180)

## 2022-01-31 ENCOUNTER — INITIAL CONSULT (OUTPATIENT)
Dept: BARIATRICS/WEIGHT MGMT | Age: 42
End: 2022-01-31

## 2022-01-31 VITALS — BODY MASS INDEX: 40.77 KG/M2 | WEIGHT: 269 LBS | HEIGHT: 68 IN

## 2022-01-31 DIAGNOSIS — Z71.3 DIETARY COUNSELING: Primary | ICD-10-CM

## 2022-01-31 DIAGNOSIS — E66.01 MORBID OBESITY DUE TO EXCESS CALORIES (HCC): ICD-10-CM

## 2022-01-31 PROCEDURE — 99999 PR OFFICE/OUTPT VISIT,PROCEDURE ONLY: CPT

## 2022-01-31 NOTE — PATIENT INSTRUCTIONS
[facility] and [facility]  Dietary Initial Appointment Patient Instructions    By: Jonas Hurst RD / LOREE  [phone]      At your initial dietary appointment you have received the following information packets:    Please be aware at each visit you have been instructed that in order for your insurance company to approve your surgery you must show a consistent weight loss of 2 lbs or greater at each visit. We can not guarantee an approval by your insurance company we can only provide the information given to us it is up to you the patient to show compliancy to your insurance company. If you do gain weight during your supervised weight loss counseling sessions insurance companies starting in 2018 are denying patients for not showing consistent weight loss results when part of a supervised weight loss counseling program. Pt was instructed on 1/31/22. Pt verbalized understanding. · Low-Fat Diet  - Please be sure to begin your low-fat diet from today's date until your scheduled surgery date. If you are not at goal weight by your history and physical appointment with your surgeon your surgery will be cancelled. · Goal Weight: 257 lbs (BMI of 39.1)  · Low-Fat Diet Contract - Patient Acknowledge and Signed  · Supplement List - Please be sure to be saving to purchase your 3 month supply of supplements. If you do not bring supplements to your history and physical appointment your surgery will be cancelled. · Supplement Contract - Patient Acknowledge and Signed  · Please be sure to take a daily Multi-vitamin from now until surgery to reduce your incidence of infection. · If your insurance company requires additional dietary counseling you will be scheduled to see the dietitian the following month. ·  If your psychological evaluation is completed and all your dietary requirements for your individual insurance company have been completed you will be submitted to insurance.  Please make sure to check with us to see if we have received your psychological evaluation. Please be aware that any co-pays or deductibles may be requested prior to testing and / or procedures. You will need to schedule a psychological evaluation for weight loss surgery. Patients will be required to complete all psychological testing as required by the psychologist. Patients must follow all of the psychologist's recommendations before weight loss surgery can be scheduled. The evaluation must be done a standard way for weight loss surgery. We strongly recommend that you contact one of our preferred providers listed below to arrange this:    Jayne Molina, [address] [facility]                                                              [address] [place] [address]                                                                                      [phone]     Raymundo Renee [address] [place] [address]                                                                                                [phone]        Dr. Trev Molina, PhD                         [address] Thomas. [place], [place]                                                                                              [phone]     You will also need to plan on attending a 2 hour nutrition class at the [facility] prior to your surgery. We will schedule this for you when we schedule your surgery. Please remember to have your labs drawn prior to your scheduled appointment to review the results of your testing. Please remember, that while we will submit your case to insurance for surgery authorization, it is your responsibility to know if your plan covers weight loss surgery and keep up-to-date with changes to your insurance coverage.   We will do everything possible to help you get approved for weight loss surgery, but cannot guarantee an approval. Please note that you will not be submitted to your insurance company until all   pre-operative testing requirements are met. · Please remember you need to schedule to attend one Support Group meeting held at the [facility] before surgery. This one meeting is mandatory. You can attend as many support group meetings before and after surgery. Support group meetings are held at the [facility] from 6:00 - 8:00pm 1st, and 3rd Tuesday of every month. See dates listed below. · Each individual person has his / her own medical requirements that need fulfilled before being able to schedule bariatric surgery . Please finalize these requirements by contacting our Bariatric Nurse at [phone].    1/26/22 Pre-Op Labs  Calcium 8.4L, Hgb 9.8 L, Hct 34.0 WNL, cholesterol 202H, triglycerides 162H, Ferritin 7L  Note:  Ferritin and Hgb were low. Pt reports hx of low iron and currently takes iron per her PCP. Instructed pt to continue to treat with PCP and inquire about possibly increasing supplemental iron to increase Hgb and Ferritin to WNL prior to sx. Pt also instructed to have Hgb and Ferritin levels rechecked prior to surgery, by her PCP. Pt voiced understanding of treatment plan. High Iron Foods  Written by: Nicole De Souza RD/LOREE    What role does Iron play in the body? Iron is an essential part of hemoglobin, which carries oxygen to your cells to make energy. Therefore, if your body doesnt have enough iron, you may lack energy, leaving you feeling tired and not at your best.  Iron also helps to keep your immune system working at peak efficiency. Where can Iron be found? Iron is found in foods of both animal and plant sources. Some iron from animal sources, called, Heme Iron, is much better absorbed by the body than plant, or Non-Heme Iron sources. What occurs when you are deficient in Iron?   Iron deficiency can lead to anemia, fatigue, infections and in general, poor health. The Best Sources of Iron:  The best sources of iron are liver, oysters, shellfish, kidney, heart, lean meat, poultry, and fish. Dried beans and vegetables are the best plant sources. Some other foods that add iron to your diet are egg yolks, dried fruits, dark molasses, whole-grain and enriched breads, van, and cereals. Milk and milk products contain no iron. Iron in Common Foods Ranked Richest to Poorest:     Food   Serving Size   Milligrams of Iron     Cereal, Fortified   1 cup   1 - 16     Clams, canned   ¼ cup   11.2     Malt-o-Meal   1 cup   9.6     Beef, Liver   3oz. 5.3     Baked Beans   1 cup   5.0     Oysters, cooked   1oz. 3.8     Venison, Roasted   3oz. 3.8     Baked Potato w/Skin   1   2.8     Soup, Lentil and Ham   1 cup   2.6     Wheat Germ, Toasted   ¼ cup   2.5     Food   Serving Size   Milligrams of Iron     Cantu Burrito   1    2.5     Soup Beef Noodle   1 cup   2.4     Rice, White, Enriched   1 cup   2.3     Spaghetti W/Tomato Sauce   1 cup   2.3     [employer], [employer]   3oz. 1.8     Apricots, Dried Halves     10   1.7     Almonds, Dry Roasted   ¼ cup   1.3     Chicken, Breast Roasted   1   0.9     Peanuts, Dry Roasted   ¼ cup   0.8     Eggs   1   0.7     Cheese, Cheddar   1 oz   0.2     Blueberries, Frozen   ½ cup   0.5     Milk, skim   1 cup   0.1       Unfortunately, after bariatric surgery, you will not be able to increase your iron with diet alone. Some patients may need to take additional iron for a lifetime due to the bypassing of the first segment of the small intestine, (Duodenum), where iron is stored or bypassing of the stomach in general.  An iron supplement may need to be added in order to improve iron lab values. Along with your iron supplement you may want to take 200-400 mg of Vitamin C, which will increase iron absorption. Taking iron on a full stomach may decrease iron absorption by 50%.   So it is best to take your iron on an empty stomach.

## 2022-01-31 NOTE — PROGRESS NOTES
CARLOS PETERSON [facility]:  Initial Nutrition Consultation    Today's Date: 1/31/2022  Patients Name:Mary Escobar     Height: 5' 8\" (1.727 m)   Weight: 269 lb (122 kg)   IBW: 167 lbs  %IBW: 161%  Excess Weight: 102 lbs   BMI: Body mass index is 40.9 kg/m². Subjective Information:   Patient has tried multiple means of weight loss including diet and exercise in the past and has been unable to maintain a healthy weight. Pt states he / she has tried the following:  [employer], Weight Watchers . Patients overall goal is to be able to lose weight with diet and exercise by changing lifestyle, habits and maintain a healthy weight for a lifetime. Are your currently Diabetic no  Last Blood Glucose Reading  85 WNL on 1/26/22  Last HGBA1C 5.2 WNL on 1/26/22    Patient states the following will be the most difficult change after weight loss surgery? Eating very small meals    Most successful diet in the past? Herbalife   Length of time patient was on the diet listed above? 1 year   Weight lost on the diet listed above? 50 lbs or more  Patient stated he / she maintained his / her weight for the following time? Several months    Patient takes the following vitamins, minerals and dietary supplements? Ferrous sulfate 300 (60 FE) once daily, vitamin D3 2,000 iu daily, MVI gummies twice daily     Patient consumes the following beverage daily? Water    1/26/22 Pre-Op Labs  Calcium 8.4L, Hgb 9.8 L, Hct 34.0 WNL, cholesterol 202H, triglycerides 162H, Ferritin 7L  Note:  Ferritin and Hgb were low. Pt reports hx of low iron and currently takes iron per her PCP. Instructed pt to continue to treat with PCP and inquire about possibly increasing supplemental iron to increase Hgb and Ferritin to WNL prior to sx. Pt also instructed to have Hgb and Ferritin levels rechecked prior to surgery, by her PCP. Pt voiced understanding of treatment plan.       Patient states he / she has the following problems:  no - Eating  no - Chewing  no - Swallowing  no - Nausea  no - Vomiting  no - Diarrhea  no - Constipation  yes - Hair Changes   Lost hair due to an MS medication   no - Skin Changes  no - Tongue Texture    Food Allergies: no    Food Intolerances: yes    Lactose intolerance - mild    Yes - Past medically assisted weight loss history reviewed. Yes - Provided education regarding the basic role of nutrition in junction with Bariatric Surgery. Yes - Provided overview of required dietary and behavioral changes pre and post operatively. Yes - Stated / reinforced that changes in dietary behaviors are critical to successful, long term weight Loss. Patient is aware that in order to proceed with bariatric surgery he / she will need to follow a low-fat diet prior to surgery. Patient is aware that bariatric surgery is a lifetime change that will require the patient to follow a low-fat, low-calorie, low-sugar, portion controlled diet with at least 30 minutes of physical activity daily. Patient is aware that certain foods may not be tolerated after bariatric surgery and certain foods will need avoided all together. Gaylia Koyanagi / LD feels that this patient knowledge / readiness to make appropriate diet / lifestyle changes assessed to be? - Good    BRIONNA / LD feels this patients expected adherence for post surgical diet? - Good    Patient was instructed and signed consents on a low-fat diet and required supplementation at initial consult. Comments: Patient is able to verbalize diet concepts for bariatric surgery. Patient is aware diet concepts will be reinforced at 2-hour nutrition education consult. RD / LD will monitor progress.

## 2022-02-03 ENCOUNTER — VIRTUAL VISIT (OUTPATIENT)
Dept: BARIATRICS/WEIGHT MGMT | Age: 42
End: 2022-02-03
Payer: COMMERCIAL

## 2022-02-03 DIAGNOSIS — D50.8 IRON DEFICIENCY ANEMIA SECONDARY TO INADEQUATE DIETARY IRON INTAKE: Primary | ICD-10-CM

## 2022-02-03 PROCEDURE — 99442 PR PHYS/QHP TELEPHONE EVALUATION 11-20 MIN: CPT | Performed by: SURGERY

## 2022-02-03 NOTE — PROGRESS NOTES
Scott Draper  2/3/2022  [facility] 621 Office phone visit    Consent:  The patient is aware that she may receive a bill for this service, depending on insurance coverage, and has provided verbal consent to proceed: Yes    Pt at home, I'm in the office, no one helped. I affirm this is a Patient Initiated Episode with an Established Patient who has not had a related appointment within my department in the past 7 days or scheduled within the next 24 hours. Patient identification was verified at the start of the visit: Yes    Total Time: minutes: 11 minutes        Scott Draper is a 37 y.o. female post EGD 1/26/2022 showed no esophagitis, no hiatal hernia, she does not complain of acid reflux, there was old food filling the stomach but no gastritis, biopsy done to check for H.pylori negative, and the gallbladder is out. Labs ferritin 7, Hgb 9.8. Weight: 269 lb   2/3/2022. Past Medical History:   Diagnosis Date    Hypertension     Morbid (severe) obesity     Morbid obesity due to excess calories (HCC)     Multiple sclerosis (HCC)     Numbness and tingling of right lower extremity      Past Surgical History:   Procedure Laterality Date    CHOLECYSTECTOMY      ENDOSCOPY, COLON, DIAGNOSTIC      TUBAL LIGATION      TYMPANOSTOMY TUBE PLACEMENT      UPPER GASTROINTESTINAL ENDOSCOPY N/A 1/26/2022    EGD BIOPSY performed by Evelyne Odom MD at [facility]     Prior to Visit Medications    Medication Sig Taking?  Authorizing Provider   tiZANidine (ZANAFLEX) 4 MG tablet TAKE 2 TABLETS BY MOUTH 3 TIMES A DAY  Keila Vegas APRN - CNP   albuterol sulfate HFA (VENTOLIN HFA) 108 (90 Base) MCG/ACT inhaler Inhale 2 puffs into the lungs 4 times daily as needed for Wheezing  RAUL Aguayo - CNP   [employer] D3 50 MCG (2000 UT) CAPS TAKE 1 CAPSULE BY MOUTH ONCE  Theoplis Brunner., MD   traZODone (DESYREL) 100 MG tablet Take 100 mg by mouth daily  Historical Provider, MD   ferrous sulfate 300 (60 Fe) MG/5ML syrup Take 300 mg by mouth daily  Historical Provider, MD   vitamin D (ERGOCALCIFEROL) 1.25 MG (86331 UT) CAPS capsule 1 capsule once a week   Historical Provider, MD      Allergies: No Known Allergies     Physical Exam:   VITALS: Last menstrual period 01/14/2022. Assessment:    Iron def anemia    Plan:   Start iron pills twice a day. Ok to proceed with workup for bariatric surgery, stay on the high protein, low calorie diet while waiting for insurance approval. Start exercising daily.       Physician Signature: Electronically signed by Dr. Evelyne Odom MD  Cc:  RAUL Anders - CNP

## 2022-02-15 ENCOUNTER — INITIAL CONSULT (OUTPATIENT)
Dept: BARIATRICS/WEIGHT MGMT | Age: 42
End: 2022-02-15

## 2022-02-15 VITALS — WEIGHT: 264 LBS | BODY MASS INDEX: 40.01 KG/M2 | HEIGHT: 68 IN

## 2022-02-15 DIAGNOSIS — Z71.3 DIETARY COUNSELING: Primary | ICD-10-CM

## 2022-02-15 DIAGNOSIS — E66.01 MORBID OBESITY DUE TO EXCESS CALORIES (HCC): ICD-10-CM

## 2022-02-15 PROCEDURE — 99999 PR OFFICE/OUTPT VISIT,PROCEDURE ONLY: CPT

## 2022-02-15 NOTE — PROGRESS NOTES
it is up to you the patient to show compliancy to your insurance company.   If you do gain weight during your supervised weight loss counseling sessions insurance companies starting in 2018 are denying patients for not showing consistent weight loss results when part of a supervised weight loss counseling program.

## 2022-03-09 ENCOUNTER — OFFICE VISIT (OUTPATIENT)
Dept: NEUROLOGY | Age: 42
End: 2022-03-09
Payer: COMMERCIAL

## 2022-03-09 VITALS
BODY MASS INDEX: 39.56 KG/M2 | TEMPERATURE: 97.1 F | WEIGHT: 261 LBS | DIASTOLIC BLOOD PRESSURE: 64 MMHG | HEART RATE: 72 BPM | OXYGEN SATURATION: 98 % | SYSTOLIC BLOOD PRESSURE: 121 MMHG | HEIGHT: 68 IN

## 2022-03-09 DIAGNOSIS — G35 MS (MULTIPLE SCLEROSIS) (HCC): Primary | ICD-10-CM

## 2022-03-09 PROCEDURE — 99215 OFFICE O/P EST HI 40 MIN: CPT | Performed by: PSYCHIATRY & NEUROLOGY

## 2022-03-09 PROCEDURE — G8427 DOCREV CUR MEDS BY ELIG CLIN: HCPCS | Performed by: PSYCHIATRY & NEUROLOGY

## 2022-03-09 PROCEDURE — 1036F TOBACCO NON-USER: CPT | Performed by: PSYCHIATRY & NEUROLOGY

## 2022-03-09 PROCEDURE — G8417 CALC BMI ABV UP PARAM F/U: HCPCS | Performed by: PSYCHIATRY & NEUROLOGY

## 2022-03-09 PROCEDURE — G8484 FLU IMMUNIZE NO ADMIN: HCPCS | Performed by: PSYCHIATRY & NEUROLOGY

## 2022-03-09 RX ORDER — OMEPRAZOLE 40 MG/1
CAPSULE, DELAYED RELEASE ORAL
Status: ON HOLD | COMMUNITY
Start: 2022-01-04 | End: 2022-08-02 | Stop reason: HOSPADM

## 2022-03-09 RX ORDER — AMLODIPINE BESYLATE 5 MG/1
TABLET ORAL
COMMUNITY
Start: 2022-02-02

## 2022-03-09 NOTE — PROGRESS NOTES
This 71-year-old right-handed [employer] woman was referred for evaluation and management of a thoracic spinal cord lesion. She was then diagnosed with multiple sclerosis. She was a fair historian. Her medications were now tizanidine 8 mg thrice daily, cholecalciferol and trazodone. She took 1 dose of ofatumumab, claiming she lost her hair from that drug. However, when seen in the emergency room, the physician felt she has shaved her head. In addition, there were no reported cases of hair loss with that disease modifying therapy. Her medical history was not remarkable for hypertension, diabetes mellitus, strokes, seizures, lung disease, heart disorders, gastrointestinal issues, cancers, skin abnormalities or depression. Her neurological problems began 2 years ago. She awoke with numbness from the right chest wall, down her flank and into her right leg and foot. She denied right arm involvement or left-sided affection. She observed weakness of the right leg. 1 day later, there was bladder urgency, without double incontinence. There were no cranial nerve abnormalities, speech deficits, poor memories, headaches, other pains, clumsiness, seizure-like activity or loss of consciousness. An MRI of her thoracic spine revealed an intrinsic spinal cord lesion from T6-T8. This lesion was suspicious for demyelinating disease. Spinal fluid analysis revealed a slightly increased IgG index. Oligoclonal bands were not performed. An MRI of her lumbosacral spine was unrevealing. She received IV Solu-Medrol therapy, with a steroid taper. She denied improvement or worsening. MRIs of her brain revealed minimal to moderate lesion load, consistent with multiple sclerosis. MRI of her cervical spine was unremarkable. She was diagnosed with multiple sclerosis and prescribed ocrelizumab. She tolerated the first 2 loading doses, without issues.   While receiving the third infusion, she developed tightness in her throat, itching all over and elevated blood pressures. That infusion was discontinued. She refused additional doses. She recovered well within hours after that infusion. Once again, there were difficulties with all the disease modifying therapies attempted. She still noted numbness and weakness of her right leg. She felt no improvement or worsening. Tizanidine 8 mg 3 times daily remained moderately effective. She continued on trazodone with improved sleep. She continued using a walker; she had not fallen or injured herself. She was able to transfer on her own. There were no cognitive issues, visual abnormalities, speech deficits or swallowing chewing problems. Her young daughter helped about the home. She denied any medical issues. She was not using medical marijuana. She had received her COVID-19 vaccinations and booster. Review of systems was otherwise unremarkable. Physical examination displayed stable vital signs. Blood pressure was 121/64. She was a young woman in no acute distress, who was alert, cooperative and oriented. She remained pleasant. She remained moderately overweight. Head examination revealed a small posterior pharyngeal aperture and completely grown in hair. Her neck was supple without thyromegaly. Her lungs were clear to auscultation. Cardiac testing was unremarkable. Peripheral pulses were +1 without bruits. Her limbs displayed no abnormalities. Neurological examination produced an intact mental status. There was no dysarthria or aphasia. She split vibration at the midline of her forehead and chin. I found no Jeramie pupil or red desaturation. There were normal tone and bulk, with 5/5 strength in both arms and the left leg. Today, on her left side, she displayed sudden giving away on strength testing. Reflexes were +1 throughout. I found no pathological reflexes.   Light touch, pinprick and vibration were decreased in the right leg, extending to the abdomen and chest wall to the T6, T7 levels. There was a T8 sensory level on the spinous process. Finger-to-nose testing was unremarkable, as well as left heel-to-shin testing. She stood today without support and walked slowly but well, without without support. She displayed nonphysiologic dragging of her left foot and leg. Laboratory data were noted above., NMO antibodies were negative. VDRL was negative. Her IgG index was slightly increased. Hepatitis panel was negative, with a negative SPIKE and a rheumatoid factor of 10. Recent CMP was unrevealing, with hemoglobin A1c of 5.2. CBC was unremarkable, past absolute lymphocyte counts were 570. This individual displays a thoracic cord lesion at T6-T8 on MRI imaging. Additional studies confirmed multiple sclerosis. She again displays nonphysiological findings. I again question her reactions to all disease modifying therapies. We will refer her to [facility] for additional evaluation and management. She remains stable medically. She will return as needed. A [facility] referral was made. We will proceed accordingly. .    I spent 40 minutes with the patient with over 50 % spent in counseling and disease management. All patient issues were addressed and all questions were answered.

## 2022-03-14 RX ORDER — TIZANIDINE 4 MG/1
TABLET ORAL
Qty: 540 TABLET | Refills: 0 | Status: SHIPPED
Start: 2022-03-14 | End: 2022-06-21

## 2022-03-15 ENCOUNTER — INITIAL CONSULT (OUTPATIENT)
Dept: BARIATRICS/WEIGHT MGMT | Age: 42
End: 2022-03-15

## 2022-03-15 VITALS — BODY MASS INDEX: 40.77 KG/M2 | HEIGHT: 68 IN | WEIGHT: 269 LBS

## 2022-03-15 DIAGNOSIS — Z71.3 DIETARY COUNSELING: Primary | ICD-10-CM

## 2022-03-15 DIAGNOSIS — E66.01 MORBID OBESITY DUE TO EXCESS CALORIES (HCC): ICD-10-CM

## 2022-03-15 PROCEDURE — 99999 PR OFFICE/OUTPT VISIT,PROCEDURE ONLY: CPT

## 2022-03-15 NOTE — PROGRESS NOTES
WEIGHT:  269 lb (122 kg)    Pt was in th office for his/her 3rd weight Loss appointment. Pt is aware he/she must meet his/her pre-op weight loss goal in order to proceed with weight loss surgery. Rafat / Star faxed a copy of what was reviewed with the pt to her PCP. Pt verbalized understanding. Rafat// Star enc the following at today's visit for successful post-surgical Weight Maintenance    Education:  Patient has been educated on the importance of reading food labels for the content of sugar and the content of fat that is in the foods serving size contributing to overall calorie consumption. Patient is aware how to identify hidden sugars and hidden fats found in food and reduce calorie intake of empty calories and high fat foods. Patient can verbalize the importance of label reading. Demonstrate the difference between a healthy food label and unhealthy food label. Real life food replicas demonstrating hidden sugars and hidden fats, and actual food labels were part of the patients education to help understand healthy eating habits and determine healthy food choices. 1. Weigh yourself daily and record it. 2. Keep documented food records daily   3. Just be more active in day to day routine   4. Higher protein intake and a higher fiber intake. Not a high protein or a high fiber diet just a higher intake. 5.Eliminate empty calorie consumption    Rafat Graves addressed the following with the pt:  - Rafat Graves enc pt to comply with nutrition recommendations  - Rd / Ld enc Participation in support group meetings  - Rafat Graves enc pt to go back to maintenance of food records and weight monitoring records   - Rafat Graves reviewed the importance of adequate sleep and stress management  - Rafat Graves reviewed nonfood strategies to cope with emotions and stress  - Rafat Graves encouraged pt to practice the following: Mindful eating: Eating slowly:  Focusing   on the eating experience without distraction  - Rafat Graves enc. pt to pay attention to hunger and fullness  - Rd / Ld enc meal planning  - Rd / Ld  Enc pt to chose nutrient dense whole foods instead of soft, high calorie foods  - Rd / Ld enc not dr Beryle Poncho large amounts of fluids with or immediately after meals    Portion control ,meal planning and avoiding empty calorie consumption. Rd / Ld reviewed insurance company weight loss requirement on 3/15/22. Pt verbalized understanding. Please be aware at each visit you have been instructed that in order for your insurance company to approve your surgery you must show a consistent weight loss of 2 lbs or greater at each visit. We can not guarantee an approval by your insurance company we can only provide the information given to us it is up to you the patient to show compliancy to your insurance company.   If you do gain weight during your supervised weight loss counseling sessions insurance companies starting in 2018 are denying patients for not showing consistent weight loss results when part of a supervised weight loss counseling program.

## 2022-03-23 ENCOUNTER — INITIAL CONSULT (OUTPATIENT)
Dept: BARIATRICS/WEIGHT MGMT | Age: 42
End: 2022-03-23
Payer: COMMERCIAL

## 2022-03-23 DIAGNOSIS — F50.81 BINGE-EATING DISORDER, MODERATE: ICD-10-CM

## 2022-03-23 DIAGNOSIS — Z71.89 ENCOUNTER FOR PSYCHOLOGICAL ASSESSMENT PRIOR TO BARIATRIC SURGERY: Primary | ICD-10-CM

## 2022-03-23 PROCEDURE — 90791 PSYCH DIAGNOSTIC EVALUATION: CPT | Performed by: SOCIAL WORKER

## 2022-03-23 PROCEDURE — 1036F TOBACCO NON-USER: CPT | Performed by: SOCIAL WORKER

## 2022-03-23 NOTE — PATIENT INSTRUCTIONS
Assignments/Recommendations: 1-2 follow-up sessions with SW for further education/evaluation. Complete entries in \"Why We Eat\", \"Reality Journal\" and \"Identifying and Handling Cravings\" to discuss next session. Attend [facility] support group on April 5 at 5:00pm.  Follow-up with: referrals/present providers/all scheduled appointments at [facility]. Handouts provided  In office.
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
## 2022-04-11 ENCOUNTER — TELEPHONE (OUTPATIENT)
Dept: NEUROLOGY | Age: 42
End: 2022-04-11

## 2022-04-11 DIAGNOSIS — G35 MULTIPLE SCLEROSIS (HCC): Primary | ICD-10-CM

## 2022-04-11 NOTE — TELEPHONE ENCOUNTER
Patient can not get to [facility] [place] and would like to stay in [place]  She would like a call back.       Electronically signed by Fern Haley MA on 4/11/2022 at 8:22 AM

## 2022-04-12 ENCOUNTER — INITIAL CONSULT (OUTPATIENT)
Dept: BARIATRICS/WEIGHT MGMT | Age: 42
End: 2022-04-12

## 2022-04-12 VITALS — BODY MASS INDEX: 39.56 KG/M2 | HEIGHT: 68 IN | WEIGHT: 261 LBS

## 2022-04-12 DIAGNOSIS — Z71.3 DIETARY COUNSELING: Primary | ICD-10-CM

## 2022-04-12 DIAGNOSIS — E66.01 MORBID OBESITY DUE TO EXCESS CALORIES (HCC): ICD-10-CM

## 2022-04-12 PROCEDURE — 99999 PR OFFICE/OUTPT VISIT,PROCEDURE ONLY: CPT

## 2022-04-12 NOTE — PROGRESS NOTES
WEIGHT:  261 lb (118.4 kg)    Pt was in th office for his/her 4th weight Loss appointment. Pt is aware he/she must meet his/her pre-op weight loss goal in order to proceed with weight loss surgery. Rafat / Star faxed a copy of what was reviewed with the pt to her PCP. Pt verbalized understanding. Rd// Ld enc the following at today's visit for successful pre/post surgical weight loss. Education:  Pt has been educated on the dangers of dining out when trying to lose weight as part of a medically / surgically supervised weight loss program.  Rafat Graves reviewed how restaurants add extra calories, fat and sugars in food preparation to make the food more palatable and enjoyable to the consumer. Rafat Graves reviewed strategies and coping skills for preparing meals at home and avoiding dinning out all together. 1. Weigh yourself daily and record it. 2. Keep documented food records daily. 3. Just be more active in day to day routine. 4. Higher protein intake and a higher fiber intake. Not a high protein or a high fiber diet     just a higher intake. 5.Eliminate empty calorie consumption. Rafat Graves addressed the following with the pt:  - Rafat Graves encouraged pt to comply with nutrition recommendations.  - Rafat / Star encouraged participation in support group meetings.  - Rafat Graves encouraged pt to go back to maintenance of food records and weight monitoring   records. - Rafat Graves reviewed the importance of adequate sleep and stress management.  - Rafat Graves reviewed non-food strategies to cope with emotions and stress.  - Rafat Graves encouraged pt to practice the following: Mindful eating: Eating slowly: Focusing     on the eating experience without distraction.  - Rafat Graves encouraged pt to pay attention to hunger and fullness cues.   - Rafat / Ld encouraged meal planning.  - Rafat / Ld  encouraged pt to chose nutrient dense whole foods instead of soft, high     calorie foods.  - Rafat / Ld encouraged not drinking large amounts of fluids with or immediately after      meals and avoiding high caloric empty beverage consumption. Portion control ,meal planning and avoiding empty calorie consumption was reviewed. Pt was encouraged to practice this daily for weight loss success. Rafat / Star reviewed insurance company weight loss requirement on 4/12/22. Pt verbalized understanding. Please be aware at each visit you have been instructed that in order for your insurance company to approve your surgery you must show a consistent weight loss of 2 lbs or greater at each visit. We can not guarantee an approval by your insurance company we can only provide the information given to us it is up to you the patient to show compliancy to your insurance company. If you do gain weight during your supervised weight loss counseling sessions insurance companies starting in 2018 are denying patients for not showing consistent weight loss results when part of a supervised weight loss counseling program.     Pt spent 120 minutes with the Rafat Graves today preparing the patient for pre/post surgical dietary changes.

## 2022-04-13 NOTE — TELEPHONE ENCOUNTER
Spoke with patient who states that it is too far for her to go to the [facility] clinic and would like someone around here.  Referral sent to Dr Warren Harley

## 2022-04-19 DIAGNOSIS — Z01.812 PRE-OPERATIVE LABORATORY EXAMINATION: Primary | ICD-10-CM

## 2022-05-06 ENCOUNTER — TELEPHONE (OUTPATIENT)
Dept: NEUROLOGY | Age: 42
End: 2022-05-06

## 2022-05-06 NOTE — TELEPHONE ENCOUNTER
Fyi patient stopped taking Meredith Ruben, want to know if can closed out request  Electronically signed by Kalyani Lazcano MA on 5/6/22 at 3:51 PM EDT

## 2022-05-10 ENCOUNTER — INITIAL CONSULT (OUTPATIENT)
Dept: BARIATRICS/WEIGHT MGMT | Age: 42
End: 2022-05-10

## 2022-05-10 VITALS — HEIGHT: 68 IN | WEIGHT: 256 LBS | BODY MASS INDEX: 38.8 KG/M2

## 2022-05-10 DIAGNOSIS — Z71.3 DIETARY COUNSELING: Primary | ICD-10-CM

## 2022-05-10 PROCEDURE — 99999 PR OFFICE/OUTPT VISIT,PROCEDURE ONLY: CPT

## 2022-05-10 NOTE — PROGRESS NOTES
WEIGHT:  256 lb (116.1 kg)    Pt was in th office for his/her 5th weight Loss appointment. Pt is aware he/she must meet his/her pre-op weight loss goal in order to proceed with weight loss surgery. Rafat / Star faxed a copy of what was reviewed with the pt to her PCP. Pt verbalized understanding. Rd// Ld enc the following at today's visit for successful pre/post surgical weight loss. Education:  Pt has been educated on the importance of weighing and measuring food for adequate portion control and to avoid extra calorie consumption from eating large portions. Rafat / Ld instructed the pt on the use and the importance of using a scale and measuring cups in order to achieve adequate measurements of common portion sizes both pre-op and post-op. Rafat Graves used actual food model replica's to show what an actual portion and serving size would look like 3 month's post-surgical after weight loss sx. Rafat Graves completed an exercise in which they had to weigh and measure foods for adequate portion control. Rafat / Star also reviewed the use of a portion controlled plate after weight loss surgery. 1. Weigh yourself daily and record it. 2. Keep documented food records daily. 3. Just be more active in day to day routine. 4. Higher protein intake and a higher fiber intake. Not a high protein or a high fiber diet     just a higher intake. 5.Eliminate empty calorie consumption. Rafat Graves addressed the following with the pt:  - Rafat Graves encouraged pt to comply with nutrition recommendations.  - Rafat / Star encouraged participation in support group meetings.  - Rafat Graves encouraged pt to go back to maintenance of food records and weight monitoring   records. - Rafat Graves reviewed the importance of adequate sleep and stress management.  - Rafat Graves reviewed non-food strategies to cope with emotions and stress.  - Rafat Graves encouraged pt to practice the following: Mindful eating: Eating slowly: Focusing     on the eating experience without distraction.   - Rafat Graves encouraged pt to pay attention to hunger and fullness cues. - Rafat / Ld encouraged meal planning.  - Rafat / Ld  encouraged pt to chose nutrient dense whole foods instead of soft, high     calorie foods.  - Rd / Ld encouraged not drinking large amounts of fluids with or immediately after      meals and avoiding high caloric empty beverage consumption. Portion control ,meal planning and avoiding empty calorie consumption was reviewed. Pt was encouraged to practice this daily for weight loss success. Rafat / Star reviewed insurance company weight loss requirement on 5/10/22. Pt verbalized understanding. Please be aware at each visit you have been instructed that in order for your insurance company to approve your surgery you must show a consistent weight loss of 2 lbs or greater at each visit. We can not guarantee an approval by your insurance company we can only provide the information given to us it is up to you the patient to show compliancy to your insurance company. If you do gain weight during your supervised weight loss counseling sessions insurance companies starting in 2018 are denying patients for not showing consistent weight loss results when part of a supervised weight loss counseling program.     Pt spent 120 minutes with the Rafat Graves today preparing the patient for pre/post surgical dietary changes.

## 2022-05-20 ENCOUNTER — HOSPITAL ENCOUNTER (OUTPATIENT)
Age: 42
Discharge: HOME | End: 2022-05-20
Payer: COMMERCIAL

## 2022-05-20 DIAGNOSIS — Z01.812 PRE-OPERATIVE LABORATORY EXAMINATION: ICD-10-CM

## 2022-05-20 LAB
AMPHETAMINE SCREEN, URINE: NOT DETECTED
BARBITURATE SCREEN URINE: NOT DETECTED
BENZODIAZEPINE SCREEN, URINE: NOT DETECTED
CANNABINOID SCREEN URINE: POSITIVE
COCAINE METABOLITE SCREEN URINE: NOT DETECTED
FENTANYL SCREEN, URINE: NOT DETECTED
Lab: ABNORMAL
METHADONE SCREEN, URINE: NOT DETECTED
OPIATE SCREEN URINE: NOT DETECTED
OXYCODONE URINE: NOT DETECTED
PHENCYCLIDINE SCREEN URINE: NOT DETECTED

## 2022-05-20 PROCEDURE — G0480 DRUG TEST DEF 1-7 CLASSES: HCPCS

## 2022-05-20 PROCEDURE — 80307 DRUG TEST PRSMV CHEM ANLYZR: CPT

## 2022-05-25 LAB
3-OH-COTININE: <2 NG/ML
COTININE: <2 NG/ML
NICOTINE: <2 NG/ML

## 2022-05-27 ENCOUNTER — INITIAL CONSULT (OUTPATIENT)
Dept: BARIATRICS/WEIGHT MGMT | Age: 42
End: 2022-05-27
Payer: COMMERCIAL

## 2022-05-27 DIAGNOSIS — F50.81 BINGE-EATING DISORDER, MODERATE: Primary | ICD-10-CM

## 2022-05-27 PROCEDURE — 1036F TOBACCO NON-USER: CPT | Performed by: SOCIAL WORKER

## 2022-05-27 PROCEDURE — 90837 PSYTX W PT 60 MINUTES: CPT | Performed by: SOCIAL WORKER

## 2022-05-27 NOTE — PROGRESS NOTES
INDIVIDUAL SESSION:  SUMMARY/PSYCH CLEARANCE     Mireya Mcarthur is a 37 y.o. , -American  female, referred by Self  for evaluation and treatment. Patient identify verified by Name and . Those attending session : patient    DX:   Encounter Diagnosis   Name Primary?  Binge-eating disorder, moderate Yes       Chief Complaint   Patient presents with    Consultation     2nd vist final clearance         Wt Readings from Last 3 Encounters:   05/10/22 256 lb (116.1 kg)   22 261 lb (118.4 kg)   03/15/22 269 lb (122 kg)        Narrative: Felecia Jiménez stated that she completed the handouts and stated that she has been focusing on healthy eating patterns in order to prepare for surgery. She stated that she is still eating things that she should not eat. She stated that scheduling issues are leading to problems with food. She stated that the \"Identifying and  Handling Cravings\" handout was interesting and she was able to identify things that trigger thoughts about food and eating including sounds, smells, watching the food channel, places, and peoples faces. She state that many of her patterns with food originated with her family. She stated that her biggest challenge will be learning the discipline needed to follow a healthy diet. She is eating healthier food, lean meats and vegetables. She has eliminated salt and sugar from her diet and making an effort to get more physical activity. Felecia Jiménez stated that her daughter is her main support.        Mental Status Exam: appearance:  appropriately dressed and appropriately groomed, behavior:  normal, attitude:  cooperative, speech:  appropriate, mood:  euthymic, affect:  congruent with mood, thought content:  no evidence of psychosis, thought process:  logical and coherent, orientation:  oriented in all spheres, memory:  recent:  good and remote:  good, insight:  fair , judgment:  fair  and cognitive:  intact and intelligent    RISK ASSESSMENT    Suicide screen: denies current suicidal ideation, plan and intent    Self Injurious Behavior: denies    Homicide screen: denies current homicidal ideation, plan and intent    TREATMENT PLAN:  Goal: Increase understanding of role of emotional factors contributing to issues with food and obesity and strategies to cope. Interventions in session:  Reviewed previous information provided to the patient and reinforced the need for continued engagement in support group and other resources of the [facility]. Provided Psychoeducational regarding physical, emotional, and behavioral changes that might occur for the patient post WLS. Assignments/Recommendations: Follow-up with SW as needed. Attend [facility] support group on June 7 at 5:00pm. Follow up with present providers/all scheduled appointment at [facility]. Next steps: Follow up with SW post surgery as needed. Bariatric Surgery: Final visit:  The patient has completed a Biopsychosocial assessment and 1 educational sessions to evaluate her appropriateness for bariatric surgery. This patient has been compliant with all scheduled sessions and completed all assignments/recommendations including attendance at least one support group meeting. She does not present with mental health issues and appears able to adapt to the EBC changes that will be necessary for success. The patient appears well motivated to make necessary changes and achieve weight loss goals. Based on the information gathered during assessment and subsequent session it appears that she is a fair candidate for Bariatric surgery.      Patient and/or family/guardian verbalizes understanding of and agreement with treatment recommendations and plan: yes    Start time: 10:44 am         End time: 11:40 am    Visit Time: [id number]

## 2022-05-27 NOTE — PATIENT INSTRUCTIONS
Assignments/Recommendations: Follow-up with SW as needed. Attend [facility] support group on June 7 at 5:00pm. Follow up with present providers/all scheduled appointment at [facility].

## 2022-06-06 ENCOUNTER — INITIAL CONSULT (OUTPATIENT)
Dept: BARIATRICS/WEIGHT MGMT | Age: 42
End: 2022-06-06

## 2022-06-06 ENCOUNTER — TELEPHONE (OUTPATIENT)
Dept: BARIATRICS/WEIGHT MGMT | Age: 42
End: 2022-06-06

## 2022-06-06 ENCOUNTER — TELEPHONE (OUTPATIENT)
Dept: ADMINISTRATIVE | Age: 42
End: 2022-06-06

## 2022-06-06 VITALS — WEIGHT: 250 LBS | HEIGHT: 68 IN | BODY MASS INDEX: 37.89 KG/M2

## 2022-06-06 DIAGNOSIS — Z01.818 PREOPERATIVE CLEARANCE: Primary | ICD-10-CM

## 2022-06-06 DIAGNOSIS — Z71.3 NUTRITIONAL COUNSELING: ICD-10-CM

## 2022-06-06 DIAGNOSIS — Z00.8 NUTRITIONAL ASSESSMENT: Primary | ICD-10-CM

## 2022-06-06 PROCEDURE — 99999 PR OFFICE/OUTPT VISIT,PROCEDURE ONLY: CPT | Performed by: SURGERY

## 2022-06-06 NOTE — PROGRESS NOTES
Weight Loss Assessment: Completed by Nutrition Services RD/LOREE Certified in Adult Weight Management:  Phone Number:  [phone]  Fax Number:   [phone]    Anne Alvarado   6/6/22  Weight Loss Appointment: 6th Weight Loss Appointment      Wt Readings from Last 3 Encounters:   06/06/22 250 lb (113.4 kg)   05/10/22 256 lb (116.1 kg)   04/12/22 261 lb (118.4 kg)        250 lb (113.4 kg)  IBW: 167 lbs         % IBW: 150%       % EBWL: 16%          ABW: 188 lbs  % ABW: 133%       BMI: Body mass index is 38.01 kg/m². Patient's 24 Hour Recall:  Breakfast: Coffee and Veggie Ophelia and Eggs  Snack: None  Lunch: Meat and Cheese  Snack: None  Dinner: Grilled [place]  Snack: None  Water Intake: 9 - 16.9 oz bottles plus  Other Beverages: None  Exercise: ADL's - Walking - 7 Day's A Week - Duration: 30 Minutes      Education:     1. Weigh yourself daily and record it. 2. Keep documented food records daily   3. 220-225 minutes a week of moderate physical activity   4. Just be more active in day to day routine   5. Higher protein intake and a higher fiber intake. Not a high protein or a high fiber diet just a higher intake. Rafat Graves addressed the following with the pt:  - Rafat Graves enc pt to comply with nutrition recommendations  - Rafat Graves enc to go back to maintenance of regular physical activity  - Periodic assessment to prevent and treat eating or other psychiatric disorders   - Rafat / Ld enc participation in support group meetings  - Rafat Graves enc pt to go back to maintenance of daily food records and weight monitoring records   - Rafat Graves reviewed the importance of adequate sleep and stress management  - Rafat Graves reviewed nonfood strategies to cope with emotions and stress  - Rafat Graves encouraged pt to practice the following: Mindful eating: Eating slowly:  Focusing on the eating experience without distraction  - Rafat Graves enc. pt to pay attention to hunger and fullness cues  - Rafat / Loree enc meal planning  - Rafat Graves  enc pt to chose Face Mask nutrient dense whole foods instead of soft, high calorie foods  - Rd / Ld enc not drinking large amounts of fluids with or immediately after meals    Portion control, meal planning and avoiding empty calorie consumption. Weight loss goal for next follow-up appointment: 10 lbs or more    Patient has established the following three goals for the next follow-up appointment. 1. Pt states she wants to consume more fresher foods and fresher vegetables. 2. Pt states she wants to increase water intake. Patient was instructed on the importance of increasing water intake to 48 - 64 oz. of water total daily. Pt. was also instructed he / she is allowed an additional 30 oz. of sugar free caffeine free clear liquid beverages for a total of 90 oz. of fluid total daily. Pt. was able to verbalize how he / she can get more water and fluids within the diet. Pt. verbalized understanding. 3. Pt states she wants to become a Pescatarian    Patient has established the following exercise goal for next follow-up appointment:  ADL's - Walking - 7 Day's A Week - Duration: 30 Minutes      Did the patient keep food records:Yes    Pt. is aware if they do not comply with [facility] Guidelines that this can lead to the patient being dismissed from the program.    The registered dietitian spent the following time 60 minutes educating the patient and providing the patient with nutritional handouts to follow. __________________________________________________________________________________  Primary Care Physician Follow-up:  Pt. was seen by Cynthia Noland RD/LOREE regarding weight loss education and follow-up on 6/6/22. This was the patients 6th appointment with the registered dietitian. The registered dietitian spent the following amount of time with the patient 60 minutes. Please Rampart the following:   The Primary Care Physician reviewed the above nutrition assessment and patient education and agrees with current diet plan. The Primary Care Physician wants the current diet plan changed to the following:_____________________________________________________________________________________________________________________________________________________________________________________________________________. Physician Signature:__________________________ Date:______________  Once signed please fax back to the [facility] [phone]. We thank you for allowing us to participate in your patients care.

## 2022-06-06 NOTE — TELEPHONE ENCOUNTER
Last Dietary Appointment Notes: 22    Syl Hyman 84: [facility]    Surgery Requested by Patient: As of 22 - LSG     Date: 2 Hour Nutrition Class: Once all testing is complete    Rd / Ld reviewed the following with the patient:    Rd / Ld at the [facility] reviewed with the patient that he / she has not completed the following in order to proceed with bariatric surgery:     - Initial Appt with Surgeon was 22. Initial Appt is only good until 23. Testing will be required again after this date per your insurance company policy. Please remember just because you finished all of your requirements if you did not finish the requirements in a timely manner they can  and you can be required to complete these requirements over again. Each [employer] has its own set of requirements with its own set of deadlines. Once everything listed below is completed you will need to complete the following to proceed with sx. The [facility] will contact you to complete this process. 1. You will be called in order to select your surgery date for insurance submission. 2. You will be called and scheduled to attend a 3 Hour Nutrition Class on the type of surgery you are having completed. These are always scheduled on  from 10:00 am to 1:00 pm and dates vary depending on the type of surgery you are having completed. You will need to purchase your bariatric supplements at this appointment cost is $240.00 to $290.00. Failure to purchase supplements or attend the class will lead to your surgery being cancelled. 3. You will need final Medical Clearance from your Primary Care Physician. Failure to complete will lead to your surgery being cancelled. 4. You will be scheduled for a H&P appointment with your surgeon . It is at this appointment you will need to make goal weight. Failure to complete will lead to your surgery being cancelled.     5. You will be scheduled for PAT at [address] [place] [address] Hospital usually the week before your scheduled surgery. Failure to complete will lead to your surgery being cancelled. Remember after all testing that is required it is your responsibility as a patient to call The [facility] to review that we received any testing results or requirements that you had completed. The [facility] is not responsible for tracking of results and testing. Your phone call will help facilitate if what is required was received and completed. - PCP to recheck HGB and Ferritin  - Positive UDS -Medical Card In Chart  - Referral for Cardic Clearance placed  -  Rd / Ld at the [facility] reviewed with the patient that he / she is at his / her goal weight for surgery and can not gain weight from now until surgery:  Yes. Patient is aware weight gain at H&P will cancel the patients surgery date. Pre-Op Weight Loss Goal: 257 lbs. Pt weighs 250 lbs. Rd / Ld reviewed with the patient that he / she must purchase a 3 month supply of supplements before his / her surgery or at the time of his / her H&P appointment and the patient states he / she is going to purchase the 3 month supply of supplements on H&P. Patient is aware that failure to purchase the supplements at this appointment will cancel the patients surgery date. Patient states at this time from the time of his / her initial consult here at the [facility] there has been no changes in his / her medical history. Patient is aware failure to disclose information can lead to his / her surgery being cancelled. Patient received a copy of this at the time of his / her final dietary consult.

## 2022-06-06 NOTE — TELEPHONE ENCOUNTER
NP scheduled from the [facility]. Patient Appointment Form:      PCP: Dr. Trae Swift  Referring: Dr. Min Chaudhry    Has the Patient:    Seen a Cardiologist? Yes 2018 Evie Luis - pt req, 1st available apt    Had a heart catheterization? No    Had heart surgery? No    Had a stress test or nuclear stress test? Yes 2018 Epic     Had an echocardiogram? Yes 2018 Epic     Had a vascular ultrasound? No    Had a 24/48 heart monitor or extended cardiac event monitor? No    Had recent blood work in the last 6 months? Yes Epic     Had a pacemaker/ICD/ILR implant? No    Seen an Electrophysiologist? No        Will send records via: All prior recs in Epic       Date & time of appointment:  6/15/22 @ 8:00 with Dr. Raleigh Sylvester.

## 2022-06-15 ENCOUNTER — OFFICE VISIT (OUTPATIENT)
Dept: CARDIOLOGY CLINIC | Age: 42
End: 2022-06-15
Payer: COMMERCIAL

## 2022-06-15 VITALS
HEART RATE: 55 BPM | SYSTOLIC BLOOD PRESSURE: 110 MMHG | DIASTOLIC BLOOD PRESSURE: 82 MMHG | BODY MASS INDEX: 38.49 KG/M2 | WEIGHT: 254 LBS | RESPIRATION RATE: 12 BRPM | HEIGHT: 68 IN

## 2022-06-15 DIAGNOSIS — E66.01 MORBID (SEVERE) OBESITY DUE TO EXCESS CALORIES (HCC): ICD-10-CM

## 2022-06-15 DIAGNOSIS — I49.3 PVC'S (PREMATURE VENTRICULAR CONTRACTIONS): ICD-10-CM

## 2022-06-15 DIAGNOSIS — Z01.818 PRE-OP EXAM: Primary | ICD-10-CM

## 2022-06-15 PROCEDURE — G8427 DOCREV CUR MEDS BY ELIG CLIN: HCPCS | Performed by: INTERNAL MEDICINE

## 2022-06-15 PROCEDURE — 93000 ELECTROCARDIOGRAM COMPLETE: CPT | Performed by: INTERNAL MEDICINE

## 2022-06-15 PROCEDURE — 1036F TOBACCO NON-USER: CPT | Performed by: INTERNAL MEDICINE

## 2022-06-15 PROCEDURE — 99204 OFFICE O/P NEW MOD 45 MIN: CPT | Performed by: INTERNAL MEDICINE

## 2022-06-15 PROCEDURE — G8417 CALC BMI ABV UP PARAM F/U: HCPCS | Performed by: INTERNAL MEDICINE

## 2022-06-15 RX ORDER — CETIRIZINE HYDROCHLORIDE 10 MG/1
TABLET ORAL
COMMUNITY
Start: 2022-05-29

## 2022-06-15 RX ORDER — DANTROLENE SODIUM 25 MG/1
1 CAPSULE ORAL
COMMUNITY
End: 2022-07-22

## 2022-06-15 NOTE — PROGRESS NOTES
Chief Complaint   Patient presents with    Cardiac Clearance       Patient Active Problem List    Diagnosis Date Noted    PVC's (premature ventricular contractions) 06/15/2022     Priority: Medium     Overview Note:     A. Holter 2018:  17.5% PVCs  B. Normal exercise stress and echo 2018      Morbid (severe) obesity     Hypertension     Multiple sclerosis ([address] 06/08/2020     Overview Note:     Diagnosis updated to problem list by Kathleen Arreola RPh 6/8/2020 8:03 AM, due to new billing requirements, based on transcribed order from Dr. Guido Members.  Numbness and tingling of right lower extremity        Current Outpatient Medications   Medication Sig Dispense Refill    cetirizine (ZYRTEC) 10 MG tablet TAKE 1 TABLET BY MOUTH EVERY DAY      dantrolene (DANTRIUM) 25 MG capsule Take 1 capsule by mouth      tiZANidine (ZANAFLEX) 4 MG tablet TAKE 2 TABLETS BY MOUTH 3 TIMES A  tablet 0    omeprazole (PRILOSEC) 40 MG delayed release capsule       amLODIPine (NORVASC) 5 MG tablet TAKE 1 TABLET BY MOUTH EVERY DAY      albuterol sulfate HFA (VENTOLIN HFA) 108 (90 Base) MCG/ACT inhaler Inhale 2 puffs into the lungs 4 times daily as needed for Wheezing 18 g 0    CVS D3 50 MCG (2000 UT) CAPS TAKE 1 CAPSULE BY MOUTH ONCE 90 capsule 3    traZODone (DESYREL) 100 MG tablet Take 100 mg by mouth daily      ferrous sulfate 300 (60 Fe) MG/5ML syrup Take 300 mg by mouth daily      vitamin D (ERGOCALCIFEROL) 1.25 MG (52462 UT) CAPS capsule 1 capsule once a week        No current facility-administered medications for this visit. Allergies   Allergen Reactions    Kcentra [Prothrombin Complex Conc Human] Anaphylaxis    Tecfidera [Dimethyl Fumarate] Anaphylaxis    Ventanilla [address] Grass Allergy Skin Test     Ocrelizumab        There were no vitals filed for this visit.     Social History     Socioeconomic History    Marital status:      Spouse name: Not on file    Number of children: Not on file    Years of education: Not on file    Highest education level: Not on file   Occupational History    Not on file   Tobacco Use    Smoking status: Former Smoker     Packs/day: 0.50     Types: Cigarettes     Quit date: 2017     Years since quittin.5    Smokeless tobacco: Never Used    Tobacco comment: only when socially drinking   Vaping Use    Vaping Use: Never used   Substance and Sexual Activity    Alcohol use: Not Currently    Drug use: Yes     Types: Marijuana Trini Awkward)     Comment: Last use 22    Sexual activity: Yes     Partners: Male   Other Topics Concern    Not on file   Social History Narrative    Not on file     Social Determinants of Health     Financial Resource Strain:     Difficulty of Paying Living Expenses: Not on file   Food Insecurity:     Worried About Running Out of Food in the Last Year: Not on file    Tammie of Food in the Last Year: Not on file   Transportation Needs:     Lack of Transportation (Medical): Not on file    Lack of Transportation (Non-Medical):  Not on file   Physical Activity:     Days of Exercise per Week: Not on file    Minutes of Exercise per Session: Not on file   Stress:     Feeling of Stress : Not on file   Social Connections:     Frequency of Communication with Friends and Family: Not on file    Frequency of Social Gatherings with Friends and Family: Not on file    Attends Mu-ism Services: Not on file    Active Member of [address] [place] [address] or Organizations: Not on file    Attends Club or Organization Meetings: Not on file    Marital Status: Not on file   Intimate Partner Violence:     Fear of Current or Ex-Partner: Not on file    Emotionally Abused: Not on file    Physically Abused: Not on file    Sexually Abused: Not on file   Housing Stability:     Unable to Pay for Housing in the Last Year: Not on file    Number of [place] in the Last Year: Not on file    Unstable Housing in the Last Year: Not on file       Family History   Problem Relation Age of Onset    Heart Disease Mother     High Blood Pressure Mother     Heart Attack Father          SUBJECTIVE: Alina Khan presents to the office today for consult for pre-op evaluation prior to bariatric surgery with Dr. Brook Epley  She complains of no new or unstable cardiac symptoms,  and denies chest pain, chest pressure/discomfort, claudication, dyspnea, exertional chest pressure/discomfort, fatigue, irregular heart beat, lower extremity edema, near-syncope, orthopnea, palpitations, paroxysmal nocturnal dyspnea, syncope and tachypnea. Does all AODLs without issue. Had w/u in 2018 for palpitations, PVCs with high burden found on 24 hr Holter, neg TTE and ETT. Not on BB now, no compliants of palps      Review of Systems:   Heart: as above   Lungs: as above   Eyes: denies changes in vision or discharge. Ears: denies changes in hearing or pain. Nose: denies epistaxis or masses   Throat: denies sore throat or trouble swallowing. Neuro: denies numbness, tingling, tremors. Skin: denies rashes or itching. : denies hematuria, dysuria   GI: denies vomiting, diarrhea   Psych: denies mood changed, anxiety, depression. all others negative. Physical Exam   There were no vitals taken for this visit. Constitutional: Oriented to person, place, and time. Obese No distress. Head: Normocephalic and atraumatic. Eyes: EOM are normal. Pupils are equal, round, and reactive to light. Neck: Normal range of motion. Neck supple. No hepatojugular reflux and no JVD present. Carotid bruit is not present. Cardiovascular: Normal rate, regular rhythm, normal heart sounds and intact distal pulses. Exam reveals no gallop and no friction rub. No murmur heard. Pulmonary/Chest: Effort normal and breath sounds normal. No respiratory distress. No wheezes. No rales. Abdominal: Soft. Bowel sounds are normal. No distension and no mass. No tenderness. No rebound and no guarding.    Musculoskeletal: Normal range of motion. No edema and no tenderness. Neurological: Alert and oriented to person, place, and time. Skin: Skin is warm and dry. No rash noted. Not diaphoretic. No erythema. Psychiatric: Normal mood and affect. Behavior is normal.     EKG:  normal EKG, normal sinus rhythm. ASSESSMENT AND PLAN:  Patient Active Problem List   Diagnosis    Numbness and tingling of right lower extremity    Multiple sclerosis (HCC)    Morbid (severe) obesity    Hypertension    PVC's (premature ventricular contractions)       ·  Patient has no high risk clinical predictors of an adverse outcome in surgery, such as recent myocardial infarction, unstable angina, heart failure, rhythm other than sinus, severe obstructive valvular disease,cva, insulin, ckd  · Able to achieve 4 METS with AODLs  · Normal CV exam and ekg  · RCRI Class I risk (< 1% chance of cardiac complication).        Mariza Galindo M.D  [facility]

## 2022-06-21 RX ORDER — TIZANIDINE 4 MG/1
TABLET ORAL
Qty: 540 TABLET | Refills: 0 | Status: SHIPPED
Start: 2022-06-21 | End: 2022-08-10

## 2022-06-23 ENCOUNTER — HOSPITAL ENCOUNTER (OUTPATIENT)
Age: 42
Discharge: HOME | End: 2022-06-23
Payer: COMMERCIAL

## 2022-06-23 LAB
ALBUMIN SERPL-MCNC: 4 G/DL (ref 3.5–5.2)
ALP BLD-CCNC: 77 U/L (ref 35–104)
ALT SERPL-CCNC: 8 U/L (ref 0–32)
ANION GAP SERPL CALCULATED.3IONS-SCNC: 15 MMOL/L (ref 7–16)
AST SERPL-CCNC: 12 U/L (ref 0–31)
BASOPHILS ABSOLUTE: 0.04 E9/L (ref 0–0.2)
BASOPHILS RELATIVE PERCENT: 0.4 % (ref 0–2)
BILIRUB SERPL-MCNC: 0.3 MG/DL (ref 0–1.2)
BUN BLDV-MCNC: 8 MG/DL (ref 6–20)
CALCIUM SERPL-MCNC: 9 MG/DL (ref 8.6–10.2)
CHLORIDE BLD-SCNC: 106 MMOL/L (ref 98–107)
CO2: 21 MMOL/L (ref 22–29)
CREAT SERPL-MCNC: 0.7 MG/DL (ref 0.5–1)
EOSINOPHILS ABSOLUTE: 0.4 E9/L (ref 0.05–0.5)
EOSINOPHILS RELATIVE PERCENT: 3.8 % (ref 0–6)
FERRITIN: 44 NG/ML
GFR AFRICAN AMERICAN: >60
GFR NON-AFRICAN AMERICAN: >60 ML/MIN/1.73
GLUCOSE BLD-MCNC: 104 MG/DL (ref 74–99)
HCT VFR BLD CALC: 38.2 % (ref 34–48)
HEMOGLOBIN: 11.8 G/DL (ref 11.5–15.5)
IMMATURE GRANULOCYTES #: 0.04 E9/L
IMMATURE GRANULOCYTES %: 0.4 % (ref 0–5)
IRON SATURATION: 28 % (ref 15–50)
IRON: 86 MCG/DL (ref 37–145)
LYMPHOCYTES ABSOLUTE: 1.79 E9/L (ref 1.5–4)
LYMPHOCYTES RELATIVE PERCENT: 16.9 % (ref 20–42)
MCH RBC QN AUTO: 27.3 PG (ref 26–35)
MCHC RBC AUTO-ENTMCNC: 30.9 % (ref 32–34.5)
MCV RBC AUTO: 88.4 FL (ref 80–99.9)
MONOCYTES ABSOLUTE: 0.5 E9/L (ref 0.1–0.95)
MONOCYTES RELATIVE PERCENT: 4.7 % (ref 2–12)
NEUTROPHILS ABSOLUTE: 7.81 E9/L (ref 1.8–7.3)
NEUTROPHILS RELATIVE PERCENT: 73.8 % (ref 43–80)
PDW BLD-RTO: 13.2 FL (ref 11.5–15)
PLATELET # BLD: 154 E9/L (ref 130–450)
PMV BLD AUTO: 13 FL (ref 7–12)
POTASSIUM SERPL-SCNC: 3.6 MMOL/L (ref 3.5–5)
RBC # BLD: 4.32 E12/L (ref 3.5–5.5)
SODIUM BLD-SCNC: 142 MMOL/L (ref 132–146)
TOTAL IRON BINDING CAPACITY: 305 MCG/DL (ref 250–450)
TOTAL PROTEIN: 6.8 G/DL (ref 6.4–8.3)
WBC # BLD: 10.6 E9/L (ref 4.5–11.5)

## 2022-06-23 PROCEDURE — 83540 ASSAY OF IRON: CPT

## 2022-06-23 PROCEDURE — 85025 COMPLETE CBC W/AUTO DIFF WBC: CPT

## 2022-06-23 PROCEDURE — 80053 COMPREHEN METABOLIC PANEL: CPT

## 2022-06-23 PROCEDURE — 82728 ASSAY OF FERRITIN: CPT

## 2022-06-23 PROCEDURE — 83550 IRON BINDING TEST: CPT

## 2022-06-23 PROCEDURE — 36415 COLL VENOUS BLD VENIPUNCTURE: CPT

## 2022-06-29 ENCOUNTER — SCHEDULED TELEPHONE ENCOUNTER (OUTPATIENT)
Dept: BARIATRICS/WEIGHT MGMT | Age: 42
End: 2022-06-29

## 2022-06-29 DIAGNOSIS — Z00.8 NUTRITIONAL ASSESSMENT: ICD-10-CM

## 2022-06-29 DIAGNOSIS — Z71.3 NUTRITIONAL COUNSELING: Primary | ICD-10-CM

## 2022-06-29 PROCEDURE — 99999 PR OFFICE/OUTPT VISIT,PROCEDURE ONLY: CPT | Performed by: DIETITIAN, REGISTERED

## 2022-06-29 NOTE — PROGRESS NOTES
Rafat Graves called the pt and scheduled the pt for the following. Pt is aware he/she needs to be down to their pre-op weight loss goal when they come in for their weight check or their sx will be cx. Pt verbalized understanding. Pre-op weight loss goal reviewed. Pt scheduled for the following see below. Pt is aware supplements are cash, check, credit or debt card. SX Type: LSG  SX Date: ???  H&P Appt: Dr. Silvia Adler ???  Weight Check Appt: This will occur after the 3 hour class at the [facility]  3 Hour Class: At the [facility] From 10:00 - 1:00 pm on - 6/13/22 - LSG Class  Supplements: Pt was provided a handout on approved protein supplements for use after WLS. (Handout -Emailed on 6/23/22)Pt was instructed he / she will need to bring his / her protein supplements to the class in order to move forward with sx or sx can be cx. Handouts reviewed and provided. Pt verbalized understanding. Pt will purchase the Bariatric approved MVI, Fe+, Ca+ and Vit D at the [facility]. 2 Hour Pt Education Book: Pt needs      Pt was instructed he / she can call any time with questions. Goal Weight 257 lbs - Pt has copy of pre-op diet. Enc pt to follow pre-op diet to get down to pre-op weight loss goal. Pt verbalized understanding.   Pt is aware sx can be cx by his / her surgeon at H&P appt if he / she feels pt has not achieved pre-op weight loss goal.

## 2022-06-30 ENCOUNTER — TELEPHONE (OUTPATIENT)
Dept: BARIATRICS/WEIGHT MGMT | Age: 42
End: 2022-06-30

## 2022-06-30 NOTE — LETTER
Date: 6-    [employer]:  Attention Prior Authorization    Regarding:  Pamela Moss  Member ID:  [id number]    Request:     Authorization for CPT 62123  (Laparoscopic Sleeve Gastrectomy)                      Diagnosis Codes:  E66.01; I10; G35    Physician: Sandra Knott M.D.   ([facility] [id number])                (NPI [id number])    Facility: [address] [place] [address] Chirag [id number])    [facility] [address] (NPI [id number])    Felisa Flores [address]     Dear Brad Lili or :     Pre-determination of insurance coverage, and authorization for hospitalization and surgical treatment are requested on behalf of your annuitant Pamela Moss for diagnoses of morbid obesity, hypertension and multiple sclerosis. Pamela Moss is 5'8, weighs 250 lb., and has a BMI of 38. She has been severely obese for a number of years despite many years of dietary efforts. She has lost weight through these efforts, however has been unable to maintain satisfactory weight loss. Pamela Moss has been evaluated in our bariatric program and is felt to be an excellent candidate  for surgery. The patient has undergone extensive pre-operative education and understands all the risks, benefits and possible complications of surgery. She has also undergone thorough nutritional evaluation and counseling with our registered dietician. Our program provides long term nutritional counseling with unlimited consults with the dietician. This patient is not currently pregnant, and a pregnancy test will be done the morning of surgery to confirm. All female patients have been educated on the importance of using reliable birth control and avoiding pregnancy for the first 2 years following bariatric surgery. All patients are nicotine tested and require a negative nicotine level prior to surgery.   Patient has been educated about the risks associated with substance use, as well as the risks of using nicotine and alcohol after surgery. Patient has been educated that these substances need to be avoided lifelong after surgery to reduce the risk of complications and sub-optimal weight loss. I am requesting authorization for Laparoscopic Sleeve Gastrectomy, procedure code 76456, with a hospital stay of 1 day, to be performed for the treatment of the patients severe and life threatening diseases. This procedure will be performed at [address] [place] [address], [facility]. I appreciate your consideration in this matter and your timely response. Supporting clinical documents are included with this request.    Electronically signed by Dr. Maria E Wilkins M.D.   Bariatric Surgery

## 2022-06-30 NOTE — TELEPHONE ENCOUNTER
Case prepared and submitted online to [facility] with request for [address] [address] [facility] 8-1-2022. Online confirmation received and patient notified of insurance submission. Also discussed need for medical clearance. Request faxed to PCP and patient was instructed to call office and ask if appointment is needed.

## 2022-06-30 NOTE — LETTER
Medical Clearance / Medical Necessity for Laparoscopic Sleeve Gastrectomy    Name: Ho Espinal     YOB: 1980    I have been caring for the above patient for _____ years. He/she suffers from the following medical conditions:  ____________________________________________________________________________________________________________________________________________    The above medical conditions are either caused by or worsened by the patients morbid obesity. Yes_________ No__________    The above medical conditions are currently stable:      Yes_________ No__________    The following medical conditions are difficult to manage/require multiple medications to achieve control due to the patients weight:   ____________________________________________________________________________________________________________________________________________                    The above patient has participated in the following medical weight loss efforts without long-term weight reduction:  ____________________________________________________________________________________________________________________________________________        I am in support of my patient undergoing a weight loss surgical procedure with [address] [place] [address] [facility] and the patient understands the risks and benefits of weight loss surgery. The patient has reasonable expectations and I believe the patient will be compliant with all post-surgical requirements. I understand the program is comprehensive with dedicated and specially trained staff.  In the event that my patient is over the age of 61, I would like to state that the patients physiological age and co-morbid conditions result in a positive risk to benefit ratio.      _______  In my medical opinion, there are no medical contraindications to proceed with gastric sleeve surgery and patients benefits of bariatric surgery outweigh the risks of bariatric surgery.     Physician Name (Please Print): __________________________________    Primary Care Physicians Signature: __________________________________    Date: ______/______/______

## 2022-07-07 ENCOUNTER — TELEPHONE (OUTPATIENT)
Dept: BARIATRICS/WEIGHT MGMT | Age: 42
End: 2022-07-07

## 2022-07-07 NOTE — LETTER
Date: 7-7-2022    [employer]:  Attention Prior Authorization    Regarding:  Marcela Streeter  Member ID:  [id number]    Request:     Appeal Request CPT 45482  (Laparoscopic Evonne-en-Y)                          Physician: Felicia Cerrato M.D.   ([facility]) [facility] [id number])                (NPI [id number])    Facility: [facility] [id number] (NPI [id number])    Felisa Francis        Dear Jodie Crow:     I am writing to appeal the denial decision for bariatric surgery for my patient Marcela Streeter. The reason for denial is stated as no documentation of a psychiatric evaluation, no documentation of eating disorders or correctable causes for obesity and no clear documentation of participation in preoperative multidisciplinary programs and support groups. This patient did obtain psychological evaluation and clearance on 5- and this documentation was included in the clinicals submitted with the original authorization request. The evaluation does address eating disorders and participation in our support group meetings. I have included the clearance again with this request.  We are accredited as a [address] through [facility] and as such we have a multidisciplinary preoperative program. Ms. Yury Dominguez did participate and comply fully with pre-operative preparatory program which included surgical evaluation, cardiac workup and clearance, psychological evaluation and clearance, nutritional evaluation and 6 months of provider supervised dietary counseling. She has also attended pre-operative support group meetings facilitated by our [facility] and is encouraged to continue attendance post-operatively. She had thyroid testing done in February 2022 by her PCP and the results are included with this appeal request. Also included is the patients written consent to file this appeal on her behalf.      I appreciate your consideration in this matter and your timely response. Electronically signed by Dr. Megan Khalil M.D.   Bariatric Surgery

## 2022-07-08 ENCOUNTER — PREP FOR PROCEDURE (OUTPATIENT)
Dept: SURGERY | Age: 42
End: 2022-07-08

## 2022-07-08 ENCOUNTER — TELEPHONE (OUTPATIENT)
Dept: BARIATRICS/WEIGHT MGMT | Age: 42
End: 2022-07-08

## 2022-07-08 DIAGNOSIS — Z76.89 ENCOUNTER FOR GASTRIC SLEEVE PROCEDURE: Primary | ICD-10-CM

## 2022-07-08 RX ORDER — SODIUM CHLORIDE, SODIUM LACTATE, POTASSIUM CHLORIDE, CALCIUM CHLORIDE 600; 310; 30; 20 MG/100ML; MG/100ML; MG/100ML; MG/100ML
INJECTION, SOLUTION INTRAVENOUS CONTINUOUS
Status: CANCELLED | OUTPATIENT
Start: 2022-07-08

## 2022-07-08 RX ORDER — SODIUM CHLORIDE 0.9 % (FLUSH) 0.9 %
5-40 SYRINGE (ML) INJECTION PRN
Status: CANCELLED | OUTPATIENT
Start: 2022-07-08

## 2022-07-08 RX ORDER — SODIUM CHLORIDE 9 MG/ML
INJECTION, SOLUTION INTRAVENOUS PRN
Status: CANCELLED | OUTPATIENT
Start: 2022-07-08

## 2022-07-08 RX ORDER — SODIUM CHLORIDE 0.9 % (FLUSH) 0.9 %
5-40 SYRINGE (ML) INJECTION EVERY 12 HOURS SCHEDULED
Status: CANCELLED | OUTPATIENT
Start: 2022-07-08

## 2022-07-08 NOTE — TELEPHONE ENCOUNTER
Per order of Dr. Jair Mccallum patient is ready to be scheduled for LSG. Call placed to patient and she is in agreement with surgery on 8-1-22. Pre-op class is scheduled and patient knows she will need to purchase supplements at that visit. Pre-op letter will be mailed. She is working towards pre-op weight loss goal.   She does not smoke and will refrain from alcohol use. We reviewed at length all meds to avoid 2 weeks prior to surgery and patient voiced understanding. This list is in the pre-op letter which will be mailed to patient. She does not use CPAP . She needs final medical clearance and will call her PCP today to get that sent to us. Patient placed on [employer] calendar. Patient placed in [employer].

## 2022-07-13 ENCOUNTER — INITIAL CONSULT (OUTPATIENT)
Dept: BARIATRICS/WEIGHT MGMT | Age: 42
End: 2022-07-13

## 2022-07-13 DIAGNOSIS — Z71.3 NUTRITIONAL COUNSELING: ICD-10-CM

## 2022-07-13 DIAGNOSIS — Z00.8 NUTRITIONAL ASSESSMENT: Primary | ICD-10-CM

## 2022-07-13 PROCEDURE — 99999 PR OFFICE/OUTPT VISIT,PROCEDURE ONLY: CPT | Performed by: DIETITIAN, REGISTERED

## 2022-07-13 NOTE — LETTER
[address] Surg Weight  [facility]  Phone: [phone]  Fax: [phone]    Jordyn Jain RD, LOREE        July 15, 2022    [address] [place] [id number]      Dear Mrailee Eli:        I personally wanted to thank you for selecting The [address] [place] [address] and [facility] as your center of choice for surgery. I wanted to take the time to let you know it means a lot to me to be able to work with you both before and after surgery and I am so glad that you will become part of our surgical family. It has been a privilege to get to know you at your 3 Hour Nutrition Class and become part of your new journey on life. I look forward to working with you in the future and helping you achieve your new goals. I can't wait to see the growth and transformation that occurs for you after your surgery. If at any time you have any questions you can always contact me [phone].      Respectfully,          Jordyn Jain RDN/ LOREE  Bariatric Dietitian  [address] [place] [address] and [facility]  Certified In Adult Weight Management I and II  Certified In Pediatric and Adolescent Weight Management        Jordyn Jain RD, LOREE

## 2022-07-15 VITALS — HEIGHT: 68 IN | BODY MASS INDEX: 38.34 KG/M2 | WEIGHT: 253 LBS

## 2022-07-15 NOTE — PROGRESS NOTES
Patient attended a 3 Hour Initial Nutrition Consult Class for LSG on 7/13/22. Patient was able to verbalize understanding of the class. [id number] [facility] and [facility]  Nutrition History and Physical     Nico Bread    Surgery Type: LSG  Today's Date: 7/15/22    yes  Patient attended a 3 hour nutrition education class on 7/13/22, and was given written educational material.  Patient signed and verbalized understanding of nutrition therapy for Bariatric Surgery. Assessment:              Vitals:    07/15/22 0810   Weight: 253 lb (114.8 kg)   Height: 5' 8\" (1.727 m)    Height: 5' 8\" (1.727 m) Weight: 253 lb (114.8 kg)   BMI:Body mass index is 38.47 kg/m².      Food Allergies and Allergies: No    Food Intolerances: No   Eating Problems:No  Chewing Problems:No  Swallowing Problems:No    Current Vitamins/Supplements and Medications:  Current Outpatient Medications:     tiZANidine (ZANAFLEX) 4 MG tablet, TAKE 2 TABLETS BY MOUTH 3 TIMES A DAY, Disp: 540 tablet, Rfl: 0    cetirizine (ZYRTEC) 10 MG tablet, TAKE 1 TABLET BY MOUTH EVERY DAY, Disp: , Rfl:     dantrolene (DANTRIUM) 25 MG capsule, Take 1 capsule by mouth, Disp: , Rfl:     omeprazole (PRILOSEC) 40 MG delayed release capsule, , Disp: , Rfl:     amLODIPine (NORVASC) 5 MG tablet, TAKE 1 TABLET BY MOUTH EVERY DAY, Disp: , Rfl:     albuterol sulfate HFA (VENTOLIN HFA) 108 (90 Base) MCG/ACT inhaler, Inhale 2 puffs into the lungs 4 times daily as needed for Wheezing, Disp: 18 g, Rfl: 0    [employer]S D3 50 MCG (2000 UT) CAPS, TAKE 1 CAPSULE BY MOUTH ONCE, Disp: 90 capsule, Rfl: 3    traZODone (DESYREL) 100 MG tablet, Take 100 mg by mouth daily, Disp: , Rfl:     ferrous sulfate 300 (60 Fe) MG/5ML syrup, Take 300 mg by mouth daily, Disp: , Rfl:     vitamin D (ERGOCALCIFEROL) 1.25 MG (09115 UT) CAPS capsule, 1 capsule once a week , Disp: , Rfl:   _    Please check all that apply:  Yes - Patient lost 10% of excess body weight prior to surgery  Yes - Patient  is able to verbalize a Bariatric Full Liquid Diet. Yes - Patient is able to verbalize the usage & importance of Protein Supplements. Yes- Patient purchased 3 month supply of protein vitamins and calcium. YES - Patient is instructed to follow a low-fat diet from now until surgery date. YES - Patient is instructed to take 30 grams of a protein supplement from  now until surgery date in addition to low-fat diet guidelines. YES - Patient is instructed to consume 64 ounces of water daily from now until surgery date.  ________________________________________________________________________  Yes - Patient did not lose 10% of excess body weight prior to surgery  ________________________________________________________________________  Yes - Patient did purchase 3 month supply of protein, vitamins and Calcium.     Comments:   [facility] Supplements

## 2022-07-20 ENCOUNTER — OFFICE VISIT (OUTPATIENT)
Dept: BARIATRICS/WEIGHT MGMT | Age: 42
End: 2022-07-20
Payer: COMMERCIAL

## 2022-07-20 VITALS
HEIGHT: 68 IN | DIASTOLIC BLOOD PRESSURE: 101 MMHG | SYSTOLIC BLOOD PRESSURE: 154 MMHG | WEIGHT: 253 LBS | BODY MASS INDEX: 38.34 KG/M2 | HEART RATE: 73 BPM | RESPIRATION RATE: 20 BRPM | TEMPERATURE: 97.4 F

## 2022-07-20 DIAGNOSIS — K91.2 MALNUTRITION FOLLOWING GASTROINTESTINAL SURGERY: Primary | ICD-10-CM

## 2022-07-20 DIAGNOSIS — R11.2 PONV (POSTOPERATIVE NAUSEA AND VOMITING): ICD-10-CM

## 2022-07-20 DIAGNOSIS — K21.9 GASTROESOPHAGEAL REFLUX DISEASE WITHOUT ESOPHAGITIS: ICD-10-CM

## 2022-07-20 DIAGNOSIS — Z98.890 PONV (POSTOPERATIVE NAUSEA AND VOMITING): ICD-10-CM

## 2022-07-20 PROCEDURE — 99213 OFFICE O/P EST LOW 20 MIN: CPT

## 2022-07-20 PROCEDURE — 99214 OFFICE O/P EST MOD 30 MIN: CPT | Performed by: SURGERY

## 2022-07-20 PROCEDURE — G8427 DOCREV CUR MEDS BY ELIG CLIN: HCPCS | Performed by: SURGERY

## 2022-07-20 PROCEDURE — G8417 CALC BMI ABV UP PARAM F/U: HCPCS | Performed by: SURGERY

## 2022-07-20 PROCEDURE — 1036F TOBACCO NON-USER: CPT | Performed by: SURGERY

## 2022-07-20 RX ORDER — ONDANSETRON 4 MG/1
4 TABLET, ORALLY DISINTEGRATING ORAL EVERY 8 HOURS PRN
Qty: 15 TABLET | Refills: 0 | Status: SHIPPED | OUTPATIENT
Start: 2022-07-20

## 2022-07-20 RX ORDER — OMEPRAZOLE 20 MG/1
20 CAPSULE, DELAYED RELEASE ORAL DAILY
Qty: 30 CAPSULE | Refills: 12 | Status: SHIPPED
Start: 2022-07-20 | End: 2022-08-15

## 2022-07-20 NOTE — PROGRESS NOTES
Wolf Serna  7/20/2022  [facility]              History and Physical  Sleeve Gastrectomy                CHIEF COMPLAINT: Morbid obesity Hypertension and GERD    HISTORY OF PRESENT ILLNESS: Wolf Serna is a morbidly-obese 37 y.o.  female, who weighs 253 lb (114.8 kg). She is 86 pounds over her ideal body weight. The Body mass index is 38.47 kg/m². She has lost 13 pounds over the past several months in preparation for surgery. She has multiple medical problems aggravated by her obesity. She wishes to have a laparoscopic sleeve gastrectomy so that she can lose more weight and keep the weight off. I have met with her on 2 different occasions in the [facility], where we discussed the surgery in great detail and went over the risks and benefits as well as the fact that sleeve patient's lose less weight than bypass patients. She has watched our informational video and understands all of the extensive risks involved. She states that she understands all of these risks and wishes to proceed. Past Medical History  Past Medical History:   Diagnosis Date    Hypertension     Morbid (severe) obesity     Morbid obesity due to excess calories (HCC)     Multiple sclerosis (HCC)     Numbness and tingling of right lower extremity       Past Surgical History  Past Surgical History:   Procedure Laterality Date    CHOLECYSTECTOMY      ENDOSCOPY, COLON, DIAGNOSTIC      TUBAL LIGATION      TYMPANOSTOMY TUBE PLACEMENT      UPPER GASTROINTESTINAL ENDOSCOPY N/A 1/26/2022    EGD BIOPSY performed by Donny Rodriguez MD at [address] [prothrombin complex conc human], Tecfidera [dimethyl fumarate], Ventanilla De Keiry 56 grass allergy skin test, and Ocrelizumab     Medications:  Current Outpatient Medications   Medication Sig Dispense Refill    omeprazole (PRILOSEC) 20 MG delayed release capsule Take 1 capsule by mouth in the morning.  30 capsule 12    ondansetron (ZOFRAN-ODT) 4 MG disintegrating tablet Take 1 tablet by mouth every 8 hours as needed for Nausea or Vomiting 15 tablet 0    tiZANidine (ZANAFLEX) 4 MG tablet TAKE 2 TABLETS BY MOUTH 3 TIMES A  tablet 0    cetirizine (ZYRTEC) 10 MG tablet TAKE 1 TABLET BY MOUTH EVERY DAY      dantrolene (DANTRIUM) 25 MG capsule Take 1 capsule by mouth      omeprazole (PRILOSEC) 40 MG delayed release capsule       amLODIPine (NORVASC) 5 MG tablet TAKE 1 TABLET BY MOUTH EVERY DAY      albuterol sulfate HFA (VENTOLIN HFA) 108 (90 Base) MCG/ACT inhaler Inhale 2 puffs into the lungs 4 times daily as needed for Wheezing 18 g 0    CVS D3 50 MCG (2000 UT) CAPS TAKE 1 CAPSULE BY MOUTH ONCE 90 capsule 3    traZODone (DESYREL) 100 MG tablet Take 100 mg by mouth daily      ferrous sulfate 300 (60 Fe) MG/5ML syrup Take 300 mg by mouth daily      vitamin D (ERGOCALCIFEROL) 1.25 MG (37259 UT) CAPS capsule 1 capsule once a week        No current facility-administered medications for this visit.       Social History     Tobacco Use    Smoking status: Former     Packs/day: 0.50     Types: Cigarettes     Quit date: 2017     Years since quittin.6    Smokeless tobacco: Never    Tobacco comments:     only when socially drinking   Substance Use Topics    Alcohol use: Not Currently      Review of Systems    Review of Systems - General ROS: negative for - chills, fatigue or malaise  ENT ROS: negative for - hearing change, nasal congestion or nasal discharge  Allergy and Immunology ROS: negative for - hives, itchy/watery eyes or nasal congestion  Hematological and Lymphatic ROS: negative for - blood clots, blood transfusions, bruising or fatigue  Endocrine ROS: negative for - malaise/lethargy, mood swings, palpitations or polydipsia/polyuria  Breast ROS: negative for - new or changing breast lumps or nipple changes  Respiratory ROS: negative for - sputum changes, stridor, tachypnea or wheezing  Cardiovascular ROS: negative for - irregular heartbeat, loss of consciousness, murmur or orthopnea  Gastrointestinal ROS: negative for - constipation, diarrhea, gas/bloating, heartburn or hematemesis  Genito-Urinary ROS: negative for - erectile dysfunction, genital discharge, genital ulcers or hematuria  Musculoskeletal ROS: negative for - gait disturbance, muscle pain or muscular weakness      Physical Exam:   BP (!) 154/101 (Site: Right Lower Arm, Position: Sitting, Cuff Size: Medium Adult)   Pulse 73   Temp 97.4 °F (36.3 °C) (Temporal)   Resp 20   Ht 5' 8\" (1.727 m)   Wt 253 lb (114.8 kg)   BMI 38.47 kg/m²     General appearance: alert, appears stated age and cooperative  Pyscho/social: negative for tremors and hallucinations  Head: Normocephalic, without obvious abnormality, atraumatic  Neck: no adenopathy, no carotid bruit, no JVD, supple, symmetrical, trachea midline and thyroid not enlarged, symmetric, no tenderness/mass/nodules  Lungs: clear to auscultation bilaterally  Heart: regular rate and rhythm, S1, S2 normal, no murmur, click, rub or gallop  Abdomen: soft, non-tender; bowel sounds normal; no masses,  no organomegaly  Extremities: extremities normal, atraumatic, no cyanosis or edema    Assessment:  Morbid obesity with failure of conservative therapy. Patient has been cleared psychologically and medically. The gall bladder is out. Upper endoscopy showed no hiatal hernia. The patient was informed that risks include, but are not limited to: death, anastomotic leak, obstruction, bleeding, and sepsis. Any of these could require further surgery. Other risks include heart attack, DVT, PE, pneumonia, hernia, wound infection, the need for dilatations of the sleeve, and the inability to lose appropriate weight and keep it off. We discussed that our goal is to ameliorate the medical problems and not to obtain a specific body mass index. She understands the risks and benefits and wishes to proceed with the procedure. She has signed a consent form.  She will go home with

## 2022-07-20 NOTE — PATIENT INSTRUCTIONS
Please follow the instruction sheets you received today for your pre-surgical skin and bowel prep. It is very important that your abdomen is properly cleaned and your bowel is cleansed of stool prior to surgery to decrease the chance of any complications. Make sure that you do not eat food or drink fluids after midnight prior to your surgery. If you eat or drink within 8 hours of your surgery, your procedure will be cancelled. Hold your medications as well unless you receive special instruction from either your surgeon or the anesthesiologist to take one of your medications with a small sip of water. Please be advised that most patients are now released from the hospital the day after surgery, regardless of procedure (Band, Bypass, or Sleeve), if they are progressing well and feel ready for discharge. You will see your surgeon prior to your hospital release so that he can examine you and discuss your discharge needs. Please call the [facility] with any questions you may have [phone]. Skin Prep    Home Instruction for Preoperative Antibacterial Dial Soap    Reason for using this soap before surgery:    - To decrease the potential for wound infection after surgery    How to use:    - Obtain Antibacterial Dial soap, either in bar or liquid form from your local store. The soap must be the Antibacterial type of Dial.    Unless you are allergic to this product or notice that it is causing skin irritation, wash with this soap from now until surgery. Make sure to shower with this soap the morning of your surgery before departing for the hospital.    Use a clean wash cloth or new body sponge. Wash from the neck down, paying particular attention to the abdominal area and belly button. Be gentle. DO NOT irritate or scrub the skin until red. Rinse thoroughly and pat dry with a clean towel. Dress with clean clothing.     Do not apply lotions or powders the morning of surgery.

## 2022-07-21 ENCOUNTER — TELEPHONE (OUTPATIENT)
Dept: BARIATRICS/WEIGHT MGMT | Age: 42
End: 2022-07-21

## 2022-07-21 NOTE — TELEPHONE ENCOUNTER
I have been checking daily for the status of the appeal request with [facility]. When I logged into the [employer] website today to check, there was a message that patient's coverage with [employer] will end on 7-. Called patient to discuss. She did not know any reason why this would happen, so I instructed her to call [facility] and inquire. She returned my call, spoke to [employer] who told her that yes her coverage with them is ending on 7- and that effective 8-1-2022 she is on traditional Medicare. She inquired with the rep because she says she never received anything from Medicare indicating this. I was able to log into [employer] and verify that she will have Medicare A and B effective 8-1-2022 (day of surgery) and the verification was scanned into media. I also entered the Medicare coverage in patient's registration with an effective date of 8-1-2022. I also advised her to call Medicare and ask for them to send her a card. Plan is for patient to proceed with her surgery on 8-1-2022. I did encourage her to Call [employer] again to inquire if she is eligible for [facility] Medicaid as her secondary coverage and she plans to do so. If she does retain her [facility] Medicaid secondary coverage, they do not require prior authorization as the secondary payor. I also clearly explained that should she opt to change to a Medicare replacement plan prior to surgery, these plans do require prior authorization for surgery, and her surgery would not be able to happen on 8-1-2022. She voiced understanding and plans to keep the traditional Medicare plan at this time.

## 2022-07-21 NOTE — TELEPHONE ENCOUNTER
Patient was accidentally scheduled with and seen by Dr. Gary Torrez yesterday for Pre-Op H&P. After the appt it was realized that she is Dr. Good Alcocer patient scheduled for LSG on 8-1-2022. I reviewed with Dr. Rosi Vogel this morning, he will update this patient's H&P day of surgery, and she does not need an in office appt with him prior to surgery.

## 2022-07-22 ENCOUNTER — HOSPITAL ENCOUNTER (OUTPATIENT)
Dept: PREADMISSION TESTING | Age: 42
Discharge: HOME | End: 2022-07-22
Payer: COMMERCIAL

## 2022-07-22 VITALS
DIASTOLIC BLOOD PRESSURE: 76 MMHG | TEMPERATURE: 97.3 F | BODY MASS INDEX: 38.55 KG/M2 | OXYGEN SATURATION: 99 % | WEIGHT: 254.4 LBS | SYSTOLIC BLOOD PRESSURE: 114 MMHG | HEART RATE: 80 BPM | HEIGHT: 68 IN | RESPIRATION RATE: 18 BRPM

## 2022-07-22 DIAGNOSIS — Z01.818 PRE-OP TESTING: Primary | ICD-10-CM

## 2022-07-22 LAB
ALBUMIN SERPL-MCNC: 3.8 G/DL (ref 3.5–5.2)
ALP BLD-CCNC: 77 U/L (ref 35–104)
ALT SERPL-CCNC: 7 U/L (ref 0–32)
ANION GAP SERPL CALCULATED.3IONS-SCNC: 9 MMOL/L (ref 7–16)
AST SERPL-CCNC: 11 U/L (ref 0–31)
BILIRUB SERPL-MCNC: 0.4 MG/DL (ref 0–1.2)
BUN BLDV-MCNC: 9 MG/DL (ref 6–20)
CALCIUM SERPL-MCNC: 8.8 MG/DL (ref 8.6–10.2)
CHLORIDE BLD-SCNC: 103 MMOL/L (ref 98–107)
CO2: 24 MMOL/L (ref 22–29)
CREAT SERPL-MCNC: 0.8 MG/DL (ref 0.5–1)
GFR AFRICAN AMERICAN: >60
GFR NON-AFRICAN AMERICAN: >60 ML/MIN/1.73
GLUCOSE BLD-MCNC: 89 MG/DL (ref 74–99)
HCT VFR BLD CALC: 38.9 % (ref 34–48)
HEMOGLOBIN: 12.2 G/DL (ref 11.5–15.5)
MCH RBC QN AUTO: 27.9 PG (ref 26–35)
MCHC RBC AUTO-ENTMCNC: 31.4 % (ref 32–34.5)
MCV RBC AUTO: 89 FL (ref 80–99.9)
PDW BLD-RTO: 14.1 FL (ref 11.5–15)
PLATELET # BLD: 154 E9/L (ref 130–450)
PMV BLD AUTO: 12.9 FL (ref 7–12)
POTASSIUM SERPL-SCNC: 4.1 MMOL/L (ref 3.5–5)
RBC # BLD: 4.37 E12/L (ref 3.5–5.5)
SODIUM BLD-SCNC: 136 MMOL/L (ref 132–146)
TOTAL PROTEIN: 6.6 G/DL (ref 6.4–8.3)
WBC # BLD: 9.5 E9/L (ref 4.5–11.5)

## 2022-07-22 PROCEDURE — 85027 COMPLETE CBC AUTOMATED: CPT

## 2022-07-22 PROCEDURE — 80053 COMPREHEN METABOLIC PANEL: CPT

## 2022-07-22 PROCEDURE — 36415 COLL VENOUS BLD VENIPUNCTURE: CPT

## 2022-07-22 RX ORDER — HYDROXYZINE HYDROCHLORIDE 25 MG/1
25 TABLET, FILM COATED ORAL NIGHTLY
COMMUNITY

## 2022-07-22 NOTE — PROGRESS NOTES
[facility]                                                                                                                    PRE OP INSTRUCTIONS FOR  Laurence Rucker        Date: 7/22/2022    Date of surgery: 8/1/22   Arrival Time: Hospital will call you between 5pm and 7pm with your final arrival time for surgery    Do not eat or drink anything after midnight prior to surgery. This includes no water, chewing gum, mints or ice chips. Follow gatorade instructions per Dr Radha Paige    Take the following medications with a small sip of water on the morning of Surgery: amlodipine, cetirizine, use and bring albuterol. Aspirin, Ibuprofen, Advil, Naproxen, Vitamin E and other Anti-inflammatory products should be stopped  before surgery  as directed by your physician. Take Tylenol only unless instructed otherwise by your surgeon. Do not smoke,use illicit drugs and do not drink any alcoholic beverages 24 hours prior to surgery. You may brush your teeth the morning of surgery. DO NOT SWALLOW WATER    You MUST make arrangements for a responsible adult to take you home after your surgery. You will not be allowed to leave alone or drive yourself home. It is strongly suggested someone stay with you the first 24 hrs. Your surgery will be cancelled if you do not have a ride home. Please wear simple, loose fitting clothing to the hospital.  Rosa Coma not bring valuables (money, credit cards, checkbooks, etc.) Do not wear any makeup (including no eye makeup) or nail polish on your fingers or toes. DO NOT wear any jewelry or piercings on day of surgery. All body piercing jewelry must be removed. Shower the night before surgery with _x__Antibacterial soap /VEE WIPES________      If appropriate bring crutches, inspirex, WALKER, CANE etc...     Notify your Surgeon if you develop any illness between now and surgery time, cough, cold, fever, sore throat, nausea, vomiting, etc.  Please notify your

## 2022-07-30 ENCOUNTER — ANESTHESIA EVENT (OUTPATIENT)
Dept: OPERATING ROOM | Age: 42
DRG: 621 | End: 2022-07-30
Payer: MEDICARE

## 2022-07-30 NOTE — ANESTHESIA PRE PROCEDURE
Department of Anesthesiology  Preprocedure Note       Name:  Linda Yoder   Age:  37 y.o.  :  1980                                          MRN:  [id number]         Date:  2022      Surgeon: Moriah Lopez):  Eddi Cooper MD    Procedure: Procedure(s):  GASTRECTOMY SLEEVE LAPAROSCOPIC ROBOTIC XI    Medications prior to admission:   Prior to Admission medications    Medication Sig Start Date End Date Taking? Authorizing Provider   hydrOXYzine HCl (ATARAX) 25 MG tablet Take 25 mg by mouth nightly    Historical Provider, MD   omeprazole (PRILOSEC) 20 MG delayed release capsule Take 1 capsule by mouth in the morning. 22  Ministerio Monte MD   ondansetron (ZOFRAN-ODT) 4 MG disintegrating tablet Take 1 tablet by mouth every 8 hours as needed for Nausea or Vomiting 22   Ministerio Monte MD   tiZANidine (ZANAFLEX) 4 MG tablet TAKE 2 TABLETS BY MOUTH 3 TIMES A DAY 22  Huan Actis., MD   cetirizine (ZYRTEC) 10 MG tablet TAKE 1 TABLET BY MOUTH EVERY DAY 22   Historical Provider, MD   omeprazole (PRILOSEC) 40 MG delayed release capsule  22   Historical Provider, MD   amLODIPine (NORVASC) 5 MG tablet TAKE 1 TABLET BY MOUTH EVERY DAY 22   Historical Provider, MD   albuterol sulfate HFA (VENTOLIN HFA) 108 (90 Base) MCG/ACT inhaler Inhale 2 puffs into the lungs 4 times daily as needed for Wheezing 21   Laurel Diggs APRN - CNP   CVS D3 50 MCG (2000 UT) CAPS TAKE 1 CAPSULE BY MOUTH ONCE 21   Huan Actis., MD   traZODone (DESYREL) 150 MG tablet Take 150 mg by mouth nightly    Historical Provider, MD   ferrous sulfate (FE TABS 325) 325 (65 Fe) MG EC tablet Take 325 mg by mouth in the morning.     Historical Provider, MD       Current medications:    Current Outpatient Medications   Medication Sig Dispense Refill    hydrOXYzine HCl (ATARAX) 25 MG tablet Take 25 mg by mouth nightly      omeprazole (PRILOSEC) 20 MG delayed release capsule Take 1 capsule by mouth in the morning. 30 capsule 12    ondansetron (ZOFRAN-ODT) 4 MG disintegrating tablet Take 1 tablet by mouth every 8 hours as needed for Nausea or Vomiting 15 tablet 0    tiZANidine (ZANAFLEX) 4 MG tablet TAKE 2 TABLETS BY MOUTH 3 TIMES A  tablet 0    cetirizine (ZYRTEC) 10 MG tablet TAKE 1 TABLET BY MOUTH EVERY DAY      omeprazole (PRILOSEC) 40 MG delayed release capsule       amLODIPine (NORVASC) 5 MG tablet TAKE 1 TABLET BY MOUTH EVERY DAY      albuterol sulfate HFA (VENTOLIN HFA) 108 (90 Base) MCG/ACT inhaler Inhale 2 puffs into the lungs 4 times daily as needed for Wheezing 18 g 0    CVS D3 50 MCG (2000 UT) CAPS TAKE 1 CAPSULE BY MOUTH ONCE 90 capsule 3    traZODone (DESYREL) 150 MG tablet Take 150 mg by mouth nightly      ferrous sulfate (FE TABS 325) 325 (65 Fe) MG EC tablet Take 325 mg by mouth in the morning. No current facility-administered medications for this visit. Allergies:     Allergies   Allergen Reactions    Kcentra [Prothrombin Complex Conc Human] Anaphylaxis    Tecfidera [Dimethyl Fumarate] Anaphylaxis    Ventanilla De Keiry 56 Grass Allergy Skin Test     Ocrelizumab        Problem List:    Patient Active Problem List   Diagnosis Code    Numbness and tingling of right lower extremity R20.0, R20.2    Multiple sclerosis ([facility] [address] G35    Morbid (severe) obesity E66.01    Hypertension I10    PVC's (premature ventricular contractions) I49.3       Past Medical History:        Diagnosis Date    Asthma due to environmental allergies     Hypertension     Morbid (severe) obesity     Morbid obesity due to excess calories ([facility] [address]     Multiple sclerosis (HCC)     Numbness and tingling of right lower extremity        Past Surgical History:        Procedure Laterality Date    CHOLECYSTECTOMY      ENDOSCOPY, COLON, DIAGNOSTIC      TUBAL LIGATION      TYMPANOSTOMY TUBE PLACEMENT      UPPER GASTROINTESTINAL ENDOSCOPY N/A 1/26/2022    EGD BIOPSY performed by Constance Mccormick MD at [address] History:    Social History     Tobacco Use    Smoking status: Former     Packs/day: 0.50     Types: Cigarettes     Quit date: 2017     Years since quittin.6    Smokeless tobacco: Never    Tobacco comments:     only when socially drinking   Substance Use Topics    Alcohol use: Not Currently                                Counseling given: Not Answered  Tobacco comments: only when socially drinking      Vital Signs (Current): There were no vitals filed for this visit. BP Readings from Last 3 Encounters:   22 114/76   22 (!) 154/101   06/15/22 110/82       NPO Status:  8+hrs                                                                               BMI:   Wt Readings from Last 3 Encounters:   22 254 lb 6.4 oz (115.4 kg)   22 253 lb (114.8 kg)   07/15/22 253 lb (114.8 kg)     There is no height or weight on file to calculate BMI.    CBC:   Lab Results   Component Value Date/Time    WBC 9.5 2022 09:50 AM    RBC 4.37 2022 09:50 AM    HGB 12.2 2022 09:50 AM    HCT 38.9 2022 09:50 AM    MCV 89.0 2022 09:50 AM    RDW 14.1 2022 09:50 AM     2022 09:50 AM       CMP:   Lab Results   Component Value Date/Time     2022 09:50 AM    K 4.1 2022 09:50 AM    K 4.0 2021 11:51 AM     2022 09:50 AM    CO2 24 2022 09:50 AM    BUN 9 2022 09:50 AM    CREATININE 0.8 2022 09:50 AM    GFRAA >60 2022 09:50 AM    LABGLOM >60 2022 09:50 AM    GLUCOSE 89 2022 09:50 AM    PROT 6.6 2022 09:50 AM    CALCIUM 8.8 2022 09:50 AM    BILITOT 0.4 2022 09:50 AM    ALKPHOS 77 2022 09:50 AM    AST 11 2022 09:50 AM    ALT 7 2022 09:50 AM       POC Tests: No results for input(s): POCGLU, POCNA, POCK, POCCL, POCBUN, POCHEMO, POCHCT in the last 72 hours.     Coags:   Lab Results   Component 6/15/22  Sinus  Bradycardia   rate 55  axis +95  WITHIN NORMAL LIMITS    ECHO 2/6/18     Summary   Left ventricle size is normal.   Ejection fraction is visually estimated at 55-60%. Normal left ventricle wall thickness. Normal diastolic function. Normal right ventricular size and function. Mild mitral regurgitation is present. Mild tricuspid regurgitation. No hemodynamically significant aortic stenosis is present. Normal PA systolic pressure. No evidence for hemodynamically significant pericardial effusion. No previous echo for comparison. CARDIAC STRESS TEST 2/14/18  1. Exercise EKG was normal.   2. The patient experienced no chest pain with exercise. Achieved   90% THR, 7 METS. 3. Patterson treadmill score was 10 implying low risk of acute   ischemic events.     4. Exercise capacity was above average. 24Hr HOLTER MONITOR 1/17/18  Patient had single PVCs and random Couplets 17.5% of time      PAT Chart Review:  Chart reviewed per routine. Above represents information available via shared medical record including previous anesthesia history, drug and allergy history.   Confirmation of above and final plan per Day of Surgery (DOS) anesthesiologist.        Callie Chaudhry RN   7/30/2022

## 2022-08-01 ENCOUNTER — ANESTHESIA (OUTPATIENT)
Dept: OPERATING ROOM | Age: 42
DRG: 621 | End: 2022-08-01
Payer: MEDICARE

## 2022-08-01 ENCOUNTER — HOSPITAL ENCOUNTER (INPATIENT)
Age: 42
LOS: 1 days | Discharge: HOME | DRG: 621 | End: 2022-08-02
Attending: SURGERY | Admitting: SURGERY
Payer: MEDICARE

## 2022-08-01 DIAGNOSIS — E66.01 MORBID OBESITY (HCC): ICD-10-CM

## 2022-08-01 LAB
HCG, URINE, POC: NEGATIVE
Lab: NORMAL
NEGATIVE QC PASS/FAIL: NORMAL
POSITIVE QC PASS/FAIL: NORMAL

## 2022-08-01 PROCEDURE — 2580000003 HC RX 258: Performed by: SURGERY

## 2022-08-01 PROCEDURE — 7100000001 HC PACU RECOVERY - ADDTL 15 MIN: Performed by: SURGERY

## 2022-08-01 PROCEDURE — 3600000009 HC SURGERY ROBOT BASE: Performed by: SURGERY

## 2022-08-01 PROCEDURE — 3700000000 HC ANESTHESIA ATTENDED CARE: Performed by: SURGERY

## 2022-08-01 PROCEDURE — 6370000000 HC RX 637 (ALT 250 FOR IP): Performed by: SURGERY

## 2022-08-01 PROCEDURE — 2500000003 HC RX 250 WO HCPCS: Performed by: SURGERY

## 2022-08-01 PROCEDURE — 3600000019 HC SURGERY ROBOT ADDTL 15MIN: Performed by: SURGERY

## 2022-08-01 PROCEDURE — 0DJ08ZZ INSPECTION OF UPPER INTESTINAL TRACT, VIA NATURAL OR ARTIFICIAL OPENING ENDOSCOPIC: ICD-10-PCS | Performed by: SURGERY

## 2022-08-01 PROCEDURE — 2709999900 HC NON-CHARGEABLE SUPPLY: Performed by: SURGERY

## 2022-08-01 PROCEDURE — 0DB64Z3 EXCISION OF STOMACH, PERCUTANEOUS ENDOSCOPIC APPROACH, VERTICAL: ICD-10-PCS | Performed by: SURGERY

## 2022-08-01 PROCEDURE — 7100000000 HC PACU RECOVERY - FIRST 15 MIN: Performed by: SURGERY

## 2022-08-01 PROCEDURE — S2900 ROBOTIC SURGICAL SYSTEM: HCPCS | Performed by: SURGERY

## 2022-08-01 PROCEDURE — 6360000002 HC RX W HCPCS: Performed by: ANESTHESIOLOGY

## 2022-08-01 PROCEDURE — 3700000001 HC ADD 15 MINUTES (ANESTHESIA): Performed by: SURGERY

## 2022-08-01 PROCEDURE — 1200000000 HC SEMI PRIVATE

## 2022-08-01 PROCEDURE — 88305 TISSUE EXAM BY PATHOLOGIST: CPT

## 2022-08-01 PROCEDURE — 88307 TISSUE EXAM BY PATHOLOGIST: CPT

## 2022-08-01 PROCEDURE — 6360000002 HC RX W HCPCS: Performed by: SURGERY

## 2022-08-01 PROCEDURE — 43775 LAP SLEEVE GASTRECTOMY: CPT | Performed by: SURGERY

## 2022-08-01 PROCEDURE — 6360000002 HC RX W HCPCS

## 2022-08-01 PROCEDURE — 2500000003 HC RX 250 WO HCPCS

## 2022-08-01 PROCEDURE — 2720000010 HC SURG SUPPLY STERILE: Performed by: SURGERY

## 2022-08-01 PROCEDURE — 8E0W4CZ ROBOTIC ASSISTED PROCEDURE OF TRUNK REGION, PERCUTANEOUS ENDOSCOPIC APPROACH: ICD-10-PCS | Performed by: SURGERY

## 2022-08-01 RX ORDER — TRAZODONE HYDROCHLORIDE 50 MG/1
150 TABLET ORAL NIGHTLY
Status: DISCONTINUED | OUTPATIENT
Start: 2022-08-01 | End: 2022-08-02 | Stop reason: HOSPADM

## 2022-08-01 RX ORDER — DIPHENHYDRAMINE HYDROCHLORIDE 50 MG/ML
12.5 INJECTION INTRAMUSCULAR; INTRAVENOUS
Status: DISCONTINUED | OUTPATIENT
Start: 2022-08-01 | End: 2022-08-01 | Stop reason: HOSPADM

## 2022-08-01 RX ORDER — ALBUTEROL SULFATE 2.5 MG/3ML
2.5 SOLUTION RESPIRATORY (INHALATION) EVERY 4 HOURS PRN
Status: DISCONTINUED | OUTPATIENT
Start: 2022-08-01 | End: 2022-08-02 | Stop reason: HOSPADM

## 2022-08-01 RX ORDER — SODIUM CHLORIDE 0.9 % (FLUSH) 0.9 %
5-40 SYRINGE (ML) INJECTION EVERY 12 HOURS SCHEDULED
Status: DISCONTINUED | OUTPATIENT
Start: 2022-08-01 | End: 2022-08-01 | Stop reason: HOSPADM

## 2022-08-01 RX ORDER — ROCURONIUM BROMIDE 10 MG/ML
INJECTION, SOLUTION INTRAVENOUS PRN
Status: DISCONTINUED | OUTPATIENT
Start: 2022-08-01 | End: 2022-08-01 | Stop reason: SDUPTHER

## 2022-08-01 RX ORDER — ACETAMINOPHEN 160 MG/5ML
650 SUSPENSION, ORAL (FINAL DOSE FORM) ORAL EVERY 6 HOURS
Status: DISCONTINUED | OUTPATIENT
Start: 2022-08-01 | End: 2022-08-02 | Stop reason: HOSPADM

## 2022-08-01 RX ORDER — SODIUM CHLORIDE 0.9 % (FLUSH) 0.9 %
5-40 SYRINGE (ML) INJECTION EVERY 12 HOURS SCHEDULED
Status: DISCONTINUED | OUTPATIENT
Start: 2022-08-01 | End: 2022-08-02 | Stop reason: HOSPADM

## 2022-08-01 RX ORDER — KETOROLAC TROMETHAMINE 30 MG/ML
30 INJECTION, SOLUTION INTRAMUSCULAR; INTRAVENOUS
Status: DISCONTINUED | OUTPATIENT
Start: 2022-08-01 | End: 2022-08-01 | Stop reason: HOSPADM

## 2022-08-01 RX ORDER — HYDRALAZINE HYDROCHLORIDE 20 MG/ML
10 INJECTION INTRAMUSCULAR; INTRAVENOUS
Status: DISCONTINUED | OUTPATIENT
Start: 2022-08-01 | End: 2022-08-01 | Stop reason: HOSPADM

## 2022-08-01 RX ORDER — MORPHINE SULFATE 4 MG/ML
4 INJECTION, SOLUTION INTRAMUSCULAR; INTRAVENOUS
Status: DISCONTINUED | OUTPATIENT
Start: 2022-08-01 | End: 2022-08-02 | Stop reason: HOSPADM

## 2022-08-01 RX ORDER — LABETALOL HYDROCHLORIDE 5 MG/ML
10 INJECTION, SOLUTION INTRAVENOUS
Status: DISCONTINUED | OUTPATIENT
Start: 2022-08-01 | End: 2022-08-01 | Stop reason: HOSPADM

## 2022-08-01 RX ORDER — TIZANIDINE 4 MG/1
2 TABLET ORAL 3 TIMES DAILY
Status: DISCONTINUED | OUTPATIENT
Start: 2022-08-01 | End: 2022-08-02 | Stop reason: HOSPADM

## 2022-08-01 RX ORDER — KETOROLAC TROMETHAMINE 30 MG/ML
30 INJECTION, SOLUTION INTRAMUSCULAR; INTRAVENOUS EVERY 6 HOURS
Status: DISCONTINUED | OUTPATIENT
Start: 2022-08-01 | End: 2022-08-02 | Stop reason: HOSPADM

## 2022-08-01 RX ORDER — PROCHLORPERAZINE EDISYLATE 5 MG/ML
5 INJECTION INTRAMUSCULAR; INTRAVENOUS
Status: COMPLETED | OUTPATIENT
Start: 2022-08-01 | End: 2022-08-01

## 2022-08-01 RX ORDER — MORPHINE SULFATE 2 MG/ML
2 INJECTION, SOLUTION INTRAMUSCULAR; INTRAVENOUS
Status: DISCONTINUED | OUTPATIENT
Start: 2022-08-01 | End: 2022-08-02 | Stop reason: HOSPADM

## 2022-08-01 RX ORDER — ENOXAPARIN SODIUM 100 MG/ML
30 INJECTION SUBCUTANEOUS 2 TIMES DAILY
Status: DISCONTINUED | OUTPATIENT
Start: 2022-08-02 | End: 2022-08-02 | Stop reason: HOSPADM

## 2022-08-01 RX ORDER — LIDOCAINE HYDROCHLORIDE 20 MG/ML
INJECTION, SOLUTION INTRAVENOUS PRN
Status: DISCONTINUED | OUTPATIENT
Start: 2022-08-01 | End: 2022-08-01 | Stop reason: SDUPTHER

## 2022-08-01 RX ORDER — PROCHLORPERAZINE EDISYLATE 5 MG/ML
10 INJECTION INTRAMUSCULAR; INTRAVENOUS EVERY 6 HOURS PRN
Status: DISCONTINUED | OUTPATIENT
Start: 2022-08-01 | End: 2022-08-02 | Stop reason: HOSPADM

## 2022-08-01 RX ORDER — SCOLOPAMINE TRANSDERMAL SYSTEM 1 MG/1
1 PATCH, EXTENDED RELEASE TRANSDERMAL
Status: DISCONTINUED | OUTPATIENT
Start: 2022-08-01 | End: 2022-08-01

## 2022-08-01 RX ORDER — SCOLOPAMINE TRANSDERMAL SYSTEM 1 MG/1
1 PATCH, EXTENDED RELEASE TRANSDERMAL
Status: DISCONTINUED | OUTPATIENT
Start: 2022-08-01 | End: 2022-08-02 | Stop reason: HOSPADM

## 2022-08-01 RX ORDER — PROMETHAZINE HYDROCHLORIDE 25 MG/ML
6.25 INJECTION, SOLUTION INTRAMUSCULAR; INTRAVENOUS
Status: DISCONTINUED | OUTPATIENT
Start: 2022-08-01 | End: 2022-08-01

## 2022-08-01 RX ORDER — ENOXAPARIN SODIUM 100 MG/ML
40 INJECTION SUBCUTANEOUS ONCE
Status: COMPLETED | OUTPATIENT
Start: 2022-08-01 | End: 2022-08-01

## 2022-08-01 RX ORDER — ONDANSETRON 2 MG/ML
4 INJECTION INTRAMUSCULAR; INTRAVENOUS
Status: COMPLETED | OUTPATIENT
Start: 2022-08-01 | End: 2022-08-01

## 2022-08-01 RX ORDER — FENTANYL CITRATE 50 UG/ML
INJECTION, SOLUTION INTRAMUSCULAR; INTRAVENOUS PRN
Status: DISCONTINUED | OUTPATIENT
Start: 2022-08-01 | End: 2022-08-01 | Stop reason: SDUPTHER

## 2022-08-01 RX ORDER — SODIUM CHLORIDE 9 MG/ML
INJECTION, SOLUTION INTRAVENOUS PRN
Status: DISCONTINUED | OUTPATIENT
Start: 2022-08-01 | End: 2022-08-02 | Stop reason: HOSPADM

## 2022-08-01 RX ORDER — PROCHLORPERAZINE EDISYLATE 5 MG/ML
INJECTION INTRAMUSCULAR; INTRAVENOUS
Status: COMPLETED
Start: 2022-08-01 | End: 2022-08-01

## 2022-08-01 RX ORDER — SODIUM CHLORIDE, SODIUM LACTATE, POTASSIUM CHLORIDE, CALCIUM CHLORIDE 600; 310; 30; 20 MG/100ML; MG/100ML; MG/100ML; MG/100ML
INJECTION, SOLUTION INTRAVENOUS CONTINUOUS
Status: DISCONTINUED | OUTPATIENT
Start: 2022-08-01 | End: 2022-08-01 | Stop reason: HOSPADM

## 2022-08-01 RX ORDER — DEXAMETHASONE SODIUM PHOSPHATE 10 MG/ML
INJECTION, EMULSION INTRAMUSCULAR; INTRAVENOUS PRN
Status: DISCONTINUED | OUTPATIENT
Start: 2022-08-01 | End: 2022-08-01

## 2022-08-01 RX ORDER — SODIUM CHLORIDE 0.9 % (FLUSH) 0.9 %
5-40 SYRINGE (ML) INJECTION PRN
Status: DISCONTINUED | OUTPATIENT
Start: 2022-08-01 | End: 2022-08-02 | Stop reason: HOSPADM

## 2022-08-01 RX ORDER — ONDANSETRON 2 MG/ML
4 INJECTION INTRAMUSCULAR; INTRAVENOUS EVERY 6 HOURS PRN
Status: DISCONTINUED | OUTPATIENT
Start: 2022-08-01 | End: 2022-08-02 | Stop reason: HOSPADM

## 2022-08-01 RX ORDER — MEPERIDINE HYDROCHLORIDE 25 MG/ML
12.5 INJECTION INTRAMUSCULAR; INTRAVENOUS; SUBCUTANEOUS EVERY 5 MIN PRN
Status: DISCONTINUED | OUTPATIENT
Start: 2022-08-01 | End: 2022-08-01 | Stop reason: HOSPADM

## 2022-08-01 RX ORDER — AMLODIPINE BESYLATE 5 MG/1
5 TABLET ORAL DAILY
Status: DISCONTINUED | OUTPATIENT
Start: 2022-08-02 | End: 2022-08-02 | Stop reason: HOSPADM

## 2022-08-01 RX ORDER — SODIUM CHLORIDE, SODIUM LACTATE, POTASSIUM CHLORIDE, CALCIUM CHLORIDE 600; 310; 30; 20 MG/100ML; MG/100ML; MG/100ML; MG/100ML
INJECTION, SOLUTION INTRAVENOUS CONTINUOUS
Status: DISCONTINUED | OUTPATIENT
Start: 2022-08-01 | End: 2022-08-02 | Stop reason: HOSPADM

## 2022-08-01 RX ORDER — OXYCODONE HYDROCHLORIDE 5 MG/1
10 TABLET ORAL EVERY 4 HOURS PRN
Status: DISCONTINUED | OUTPATIENT
Start: 2022-08-01 | End: 2022-08-02 | Stop reason: HOSPADM

## 2022-08-01 RX ORDER — BUPIVACAINE HYDROCHLORIDE AND EPINEPHRINE 2.5; 5 MG/ML; UG/ML
INJECTION, SOLUTION EPIDURAL; INFILTRATION; INTRACAUDAL; PERINEURAL PRN
Status: DISCONTINUED | OUTPATIENT
Start: 2022-08-01 | End: 2022-08-01 | Stop reason: ALTCHOICE

## 2022-08-01 RX ORDER — DEXAMETHASONE SODIUM PHOSPHATE 10 MG/ML
INJECTION, SOLUTION INTRAMUSCULAR; INTRAVENOUS PRN
Status: DISCONTINUED | OUTPATIENT
Start: 2022-08-01 | End: 2022-08-01 | Stop reason: SDUPTHER

## 2022-08-01 RX ORDER — ACETAMINOPHEN 500 MG
1000 TABLET ORAL ONCE
Status: COMPLETED | OUTPATIENT
Start: 2022-08-01 | End: 2022-08-01

## 2022-08-01 RX ORDER — SODIUM CHLORIDE 0.9 % (FLUSH) 0.9 %
5-40 SYRINGE (ML) INJECTION PRN
Status: DISCONTINUED | OUTPATIENT
Start: 2022-08-01 | End: 2022-08-01 | Stop reason: HOSPADM

## 2022-08-01 RX ORDER — KETOROLAC TROMETHAMINE 30 MG/ML
30 INJECTION, SOLUTION INTRAMUSCULAR; INTRAVENOUS ONCE
Status: COMPLETED | OUTPATIENT
Start: 2022-08-01 | End: 2022-08-01

## 2022-08-01 RX ORDER — SODIUM CHLORIDE 9 MG/ML
INJECTION, SOLUTION INTRAVENOUS PRN
Status: DISCONTINUED | OUTPATIENT
Start: 2022-08-01 | End: 2022-08-01 | Stop reason: HOSPADM

## 2022-08-01 RX ORDER — PROMETHAZINE HYDROCHLORIDE 25 MG/1
25 SUPPOSITORY RECTAL EVERY 6 HOURS PRN
Status: DISCONTINUED | OUTPATIENT
Start: 2022-08-01 | End: 2022-08-02 | Stop reason: HOSPADM

## 2022-08-01 RX ORDER — MORPHINE SULFATE 2 MG/ML
2 INJECTION, SOLUTION INTRAMUSCULAR; INTRAVENOUS EVERY 5 MIN PRN
Status: DISCONTINUED | OUTPATIENT
Start: 2022-08-01 | End: 2022-08-01 | Stop reason: HOSPADM

## 2022-08-01 RX ORDER — PROPOFOL 10 MG/ML
INJECTION, EMULSION INTRAVENOUS PRN
Status: DISCONTINUED | OUTPATIENT
Start: 2022-08-01 | End: 2022-08-01 | Stop reason: SDUPTHER

## 2022-08-01 RX ORDER — LORAZEPAM 2 MG/ML
0.5 INJECTION INTRAMUSCULAR
Status: DISCONTINUED | OUTPATIENT
Start: 2022-08-01 | End: 2022-08-01 | Stop reason: HOSPADM

## 2022-08-01 RX ORDER — IPRATROPIUM BROMIDE AND ALBUTEROL SULFATE 2.5; .5 MG/3ML; MG/3ML
1 SOLUTION RESPIRATORY (INHALATION)
Status: DISCONTINUED | OUTPATIENT
Start: 2022-08-01 | End: 2022-08-01 | Stop reason: HOSPADM

## 2022-08-01 RX ORDER — OXYCODONE HYDROCHLORIDE 5 MG/1
5 TABLET ORAL EVERY 4 HOURS PRN
Status: DISCONTINUED | OUTPATIENT
Start: 2022-08-01 | End: 2022-08-02 | Stop reason: HOSPADM

## 2022-08-01 RX ORDER — PROMETHAZINE HYDROCHLORIDE 25 MG/ML
25 INJECTION, SOLUTION INTRAMUSCULAR; INTRAVENOUS
Status: COMPLETED | OUTPATIENT
Start: 2022-08-01 | End: 2022-08-01

## 2022-08-01 RX ORDER — ONDANSETRON 2 MG/ML
INJECTION INTRAMUSCULAR; INTRAVENOUS
Status: COMPLETED
Start: 2022-08-01 | End: 2022-08-01

## 2022-08-01 RX ORDER — MAGNESIUM HYDROXIDE/ALUMINUM HYDROXICE/SIMETHICONE 120; 1200; 1200 MG/30ML; MG/30ML; MG/30ML
10 SUSPENSION ORAL EVERY 4 HOURS PRN
Status: DISCONTINUED | OUTPATIENT
Start: 2022-08-01 | End: 2022-08-02 | Stop reason: HOSPADM

## 2022-08-01 RX ORDER — MIDAZOLAM HYDROCHLORIDE 1 MG/ML
INJECTION INTRAMUSCULAR; INTRAVENOUS PRN
Status: DISCONTINUED | OUTPATIENT
Start: 2022-08-01 | End: 2022-08-01 | Stop reason: SDUPTHER

## 2022-08-01 RX ORDER — PANTOPRAZOLE SODIUM 40 MG/1
40 TABLET, DELAYED RELEASE ORAL
Status: DISCONTINUED | OUTPATIENT
Start: 2022-08-01 | End: 2022-08-02 | Stop reason: HOSPADM

## 2022-08-01 RX ADMIN — OXYCODONE 10 MG: 5 TABLET ORAL at 14:33

## 2022-08-01 RX ADMIN — FENTANYL CITRATE 50 MCG: 50 INJECTION, SOLUTION INTRAMUSCULAR; INTRAVENOUS at 08:23

## 2022-08-01 RX ADMIN — KETOROLAC TROMETHAMINE 30 MG: 30 INJECTION, SOLUTION INTRAMUSCULAR; INTRAVENOUS at 13:36

## 2022-08-01 RX ADMIN — LIDOCAINE HYDROCHLORIDE 100 MG: 20 INJECTION, SOLUTION INTRAVENOUS at 08:01

## 2022-08-01 RX ADMIN — DEXAMETHASONE SODIUM PHOSPHATE 10 MG: 10 INJECTION, SOLUTION INTRAMUSCULAR; INTRAVENOUS at 08:54

## 2022-08-01 RX ADMIN — FENTANYL CITRATE 100 MCG: 50 INJECTION, SOLUTION INTRAMUSCULAR; INTRAVENOUS at 07:57

## 2022-08-01 RX ADMIN — ACETAMINOPHEN 1000 MG: 500 TABLET ORAL at 07:11

## 2022-08-01 RX ADMIN — ONDANSETRON 4 MG: 2 INJECTION INTRAMUSCULAR; INTRAVENOUS at 10:04

## 2022-08-01 RX ADMIN — ONDANSETRON 4 MG: 2 INJECTION INTRAMUSCULAR; INTRAVENOUS at 18:45

## 2022-08-01 RX ADMIN — PANTOPRAZOLE SODIUM 40 MG: 40 TABLET, DELAYED RELEASE ORAL at 18:45

## 2022-08-01 RX ADMIN — PROCHLORPERAZINE EDISYLATE 5 MG: 5 INJECTION INTRAMUSCULAR; INTRAVENOUS at 10:13

## 2022-08-01 RX ADMIN — SODIUM CHLORIDE, POTASSIUM CHLORIDE, SODIUM LACTATE AND CALCIUM CHLORIDE: 600; 310; 30; 20 INJECTION, SOLUTION INTRAVENOUS at 07:10

## 2022-08-01 RX ADMIN — HYDROMORPHONE HYDROCHLORIDE 0.5 MG: 1 INJECTION, SOLUTION INTRAMUSCULAR; INTRAVENOUS; SUBCUTANEOUS at 11:02

## 2022-08-01 RX ADMIN — KETOROLAC TROMETHAMINE 30 MG: 30 INJECTION, SOLUTION INTRAMUSCULAR; INTRAVENOUS at 19:34

## 2022-08-01 RX ADMIN — HYDROMORPHONE HYDROCHLORIDE 0.5 MG: 1 INJECTION, SOLUTION INTRAMUSCULAR; INTRAVENOUS; SUBCUTANEOUS at 10:19

## 2022-08-01 RX ADMIN — SODIUM CHLORIDE, POTASSIUM CHLORIDE, SODIUM LACTATE AND CALCIUM CHLORIDE: 600; 310; 30; 20 INJECTION, SOLUTION INTRAVENOUS at 21:43

## 2022-08-01 RX ADMIN — SUGAMMADEX 500 MG: 100 INJECTION, SOLUTION INTRAVENOUS at 09:36

## 2022-08-01 RX ADMIN — ROCURONIUM BROMIDE 50 MG: 10 INJECTION, SOLUTION INTRAVENOUS at 08:03

## 2022-08-01 RX ADMIN — CEFAZOLIN 2000 MG: 2 INJECTION, POWDER, FOR SOLUTION INTRAMUSCULAR; INTRAVENOUS at 08:07

## 2022-08-01 RX ADMIN — ROCURONIUM BROMIDE 30 MG: 10 INJECTION, SOLUTION INTRAVENOUS at 08:32

## 2022-08-01 RX ADMIN — TIZANIDINE 2 MG: 4 TABLET ORAL at 14:44

## 2022-08-01 RX ADMIN — SODIUM CHLORIDE, POTASSIUM CHLORIDE, SODIUM LACTATE AND CALCIUM CHLORIDE: 600; 310; 30; 20 INJECTION, SOLUTION INTRAVENOUS at 14:37

## 2022-08-01 RX ADMIN — ACETAMINOPHEN 650 MG: 160 SUSPENSION ORAL at 14:26

## 2022-08-01 RX ADMIN — MIDAZOLAM 2 MG: 1 INJECTION INTRAMUSCULAR; INTRAVENOUS at 07:56

## 2022-08-01 RX ADMIN — PROMETHAZINE HYDROCHLORIDE 25 MG: 25 INJECTION INTRAMUSCULAR; INTRAVENOUS at 10:52

## 2022-08-01 RX ADMIN — TRAZODONE HYDROCHLORIDE 150 MG: 50 TABLET ORAL at 21:48

## 2022-08-01 RX ADMIN — SODIUM CHLORIDE, POTASSIUM CHLORIDE, SODIUM LACTATE AND CALCIUM CHLORIDE: 600; 310; 30; 20 INJECTION, SOLUTION INTRAVENOUS at 09:35

## 2022-08-01 RX ADMIN — KETOROLAC TROMETHAMINE 30 MG: 30 INJECTION, SOLUTION INTRAMUSCULAR at 07:11

## 2022-08-01 RX ADMIN — PROPOFOL 200 MG: 10 INJECTION, EMULSION INTRAVENOUS at 08:01

## 2022-08-01 RX ADMIN — ACETAMINOPHEN 650 MG: 160 SUSPENSION ORAL at 21:44

## 2022-08-01 RX ADMIN — SODIUM CHLORIDE, POTASSIUM CHLORIDE, SODIUM LACTATE AND CALCIUM CHLORIDE: 600; 310; 30; 20 INJECTION, SOLUTION INTRAVENOUS at 08:25

## 2022-08-01 RX ADMIN — TIZANIDINE 2 MG: 4 TABLET ORAL at 21:47

## 2022-08-01 RX ADMIN — ENOXAPARIN SODIUM 40 MG: 100 INJECTION SUBCUTANEOUS at 14:22

## 2022-08-01 RX ADMIN — HYDROMORPHONE HYDROCHLORIDE 0.5 MG: 1 INJECTION, SOLUTION INTRAMUSCULAR; INTRAVENOUS; SUBCUTANEOUS at 10:31

## 2022-08-01 RX ADMIN — OXYCODONE 10 MG: 5 TABLET ORAL at 18:44

## 2022-08-01 ASSESSMENT — PAIN DESCRIPTION - ONSET
ONSET: ON-GOING
ONSET: PROGRESSIVE
ONSET: ON-GOING

## 2022-08-01 ASSESSMENT — PAIN DESCRIPTION - ORIENTATION
ORIENTATION: MID

## 2022-08-01 ASSESSMENT — PAIN DESCRIPTION - DESCRIPTORS
DESCRIPTORS: ACHING;DISCOMFORT
DESCRIPTORS: ACHING
DESCRIPTORS: ACHING;SORE;SPASM
DESCRIPTORS: ACHING;DISCOMFORT
DESCRIPTORS: SHARP;PRESSURE;TENDER

## 2022-08-01 ASSESSMENT — PAIN DESCRIPTION - FREQUENCY
FREQUENCY: CONTINUOUS

## 2022-08-01 ASSESSMENT — PAIN SCALES - GENERAL
PAINLEVEL_OUTOF10: 8
PAINLEVEL_OUTOF10: 7
PAINLEVEL_OUTOF10: 8
PAINLEVEL_OUTOF10: 7
PAINLEVEL_OUTOF10: 8
PAINLEVEL_OUTOF10: 0
PAINLEVEL_OUTOF10: 10

## 2022-08-01 ASSESSMENT — PAIN DESCRIPTION - LOCATION
LOCATION: ABDOMEN

## 2022-08-01 ASSESSMENT — PAIN DESCRIPTION - PAIN TYPE
TYPE: ACUTE PAIN;SURGICAL PAIN

## 2022-08-01 ASSESSMENT — PAIN - FUNCTIONAL ASSESSMENT
PAIN_FUNCTIONAL_ASSESSMENT: INTOLERABLE, UNABLE TO DO ANY ACTIVE OR PASSIVE ACTIVITIES
PAIN_FUNCTIONAL_ASSESSMENT: PREVENTS OR INTERFERES SOME ACTIVE ACTIVITIES AND ADLS

## 2022-08-01 ASSESSMENT — LIFESTYLE VARIABLES: SMOKING_STATUS: 0

## 2022-08-01 NOTE — H&P
Volodymyr Gongora  8/1/2022  [facility]              History and Physical  Sleeve Gastrectomy (69821)    CHIEF COMPLAINT:      Numbness and tingling of right lower extremity     Multiple sclerosis (HCC)     Morbid (severe) obesity     Hypertension     PVC's (premature ventricular contractions)        HISTORY OF PRESENT ILLNESS: Volodymyr Gongora is a morbidly-obese 37 y.o.  female who weighs 251 lb (113.9 kg). She has multiple medical problems aggravated by her obesity. She wishes to have a laparoscopic sleeve gastrectomy so that she can lose more weight and keep the weight off. I have met with her on 2 different occasions in [facility], where we discussed the surgery in great detail and went over the risks and benefits as well as the fact that sleeve patient's lose less weight than bypass patients. She has watched our informational video and understands all of the extensive risks involved. She states that she understands all of these risks and wishes to proceed. Weights:  251 lb 8/1/2022  Sleeve  269 lb 2/3/2022 initial    Past Medical History:   Diagnosis Date    Asthma due to environmental allergies     Hypertension     Morbid (severe) obesity     Morbid obesity due to excess calories (HCC)     Multiple sclerosis (HCC)     Numbness and tingling of right lower extremity        Past Surgical History:   Procedure Laterality Date    CHOLECYSTECTOMY      ENDOSCOPY, COLON, DIAGNOSTIC      TUBAL LIGATION      TYMPANOSTOMY TUBE PLACEMENT      UPPER GASTROINTESTINAL ENDOSCOPY N/A 1/26/2022    EGD BIOPSY performed by Megan Khalil MD at [address] [place] [address] Medications  Prior to Visit Medications    Medication Sig Taking? Authorizing Provider   hydrOXYzine HCl (ATARAX) 25 MG tablet Take 25 mg by mouth nightly  Historical Provider, MD   omeprazole (PRILOSEC) 20 MG delayed release capsule Take 1 capsule by mouth in the morning.   Chidi Lockhart MD   ondansetron (ZOFRAN-ODT) 4 MG disintegrating tablet Take 1 tablet by mouth every 8 hours as needed for Nausea or Vomiting  Araceli Villanueva MD   tiZANidine (ZANAFLEX) 4 MG tablet TAKE 2 TABLETS BY MOUTH 3 TIMES A DAY  Siobhan Salcedo MD   cetirizine (ZYRTEC) 10 MG tablet TAKE 1 TABLET BY MOUTH EVERY DAY  Historical Provider, MD   omeprazole (PRILOSEC) 40 MG delayed release capsule   Historical Provider, MD   amLODIPine (NORVASC) 5 MG tablet TAKE 1 TABLET BY MOUTH EVERY DAY  Historical Provider, MD   albuterol sulfate HFA (VENTOLIN HFA) 108 (90 Base) MCG/ACT inhaler Inhale 2 puffs into the lungs 4 times daily as needed for Wheezing  RAUL Brothers - CNP   CVS D3 50 MCG (2000 UT) CAPS TAKE 1 CAPSULE BY MOUTH ONCE  Siobhan Salcedo MD   traZODone (DESYREL) 150 MG tablet Take 150 mg by mouth nightly  Historical Provider, MD   ferrous sulfate (FE TABS 325) 325 (65 Fe) MG EC tablet Take 325 mg by mouth in the morning. Historical Provider, MD       Allergies: Kcentra [prothrombin complex conc human], Tecfidera [dimethyl fumarate], Ventanilla De Keiry 56 grass allergy skin test, and Ocrelizumab   Social History:   TOBACCO:   reports that she quit smoking about 4 years ago. Her smoking use included cigarettes. She smoked an average of .5 packs per day. She has never used smokeless tobacco.  All smokers must join the free smoking cessation program and stop smoking for 3 months before having any Bariatric surgery. ETOH:    reports that she does not currently use alcohol. Family History   Problem Relation Age of Onset    Heart Disease Mother     High Blood Pressure Mother     Heart Attack Father        Review of Systems:  Psychiatric:  depression and anxiety  Respiratory: negative  Cardiovascular: negative  Gastrointestinal: negative  Musculoskeletal:negative  All others reviewed, negative    Physical Exam:   VITALS: Weight 251 lb (113.9 kg).   General appearance: alert, appears stated age and cooperative, does ambulate easily  Head: Normocephalic, without obvious abnormality, atraumatic  Eyes: PERRL  Ears/mouth/throat:  Ears clear, mouth normal, throat no redness  Neck: no adenopathy, no JVD, supple, symmetrical, trachea midline and thyroid not enlarged  Lungs: clear to auscultation bilaterally  Heart: regular rate and rhythm  Abdomen: soft, non-tender; bowel sounds normal; no masses,  no organomegaly  Extremities: extremities normal, atraumatic, no cyanosis or edema  Skin: no open wounds    Assessment:  Morbid obesity with failure of conservative therapy. Patient has been cleared psychologically and medically. EGD 1/26/2022 showed no esophagitis, no hiatal hernia, she does not complain of acid reflux, there was old food filling the stomach but no gastritis, biopsy done to check for H.pylori negative, and the gallbladder is out. Labs ferritin 7, Hgb 9.8 The patient was informed that risks include, but are not limited to: death, anastomotic leak, obstruction, bleeding, and sepsis. Any of these could require further surgery. Other risks include heart attack, DVT, PE, pneumonia, hernia, wound infection, the need for dilatations of the sleeve, and the inability to lose appropriate weight and keep it off. We discussed that our goal is to ameliorate the medical problems and not to obtain a specific body mass index. She understands the risks and benefits and wishes to proceed with the procedure. She has signed a consent form. She will go home with Acetaminophen 500 mg qid for 3 days, Carafate 1/2 tablet every 4 hours for ulcer prophylaxis and Zofran for nausea. Plan:   Robotic Sleeve Gastrectomy.     Physician Signature: Electronically signed by Dr. Ruben Bellamy MD    Send copy of H&P to PCP, Kellie Arambula, APRN - CNP

## 2022-08-01 NOTE — ANESTHESIA POSTPROCEDURE EVALUATION
Department of Anesthesiology  Postprocedure Note    Patient: Volodymyr Gongora  MRN: [id number]  YOB: 1980  Date of evaluation: 8/1/2022      Procedure Summary     Date: 08/01/22 Room / Location: [address] [place] [address] AT [facility]    Anesthesia Start: 1816 Anesthesia Stop: 2954    Procedure: GASTRECTOMY SLEEVE LAPAROSCOPIC ROBOTIC XI (Abdomen) Diagnosis:       Morbid obesity ([address]      (Morbid obesity ([address] [E66.01])    Surgeons: Megan Khalil MD Responsible Provider: Hang Gonzales MD    Anesthesia Type: General ASA Status: 3          Anesthesia Type: General    Padmini Phase I: Padmini Score: 9    Padmini Phase II:        Anesthesia Post Evaluation    Patient location during evaluation: PACU  Patient participation: complete - patient participated  Level of consciousness: awake  Airway patency: patent  Nausea & Vomiting: no nausea and no vomiting  Complications: no  Cardiovascular status: hemodynamically stable  Respiratory status: acceptable  Hydration status: euvolemic

## 2022-08-01 NOTE — PLAN OF CARE
Problem: Discharge Planning  Goal: Discharge to home or other facility with appropriate resources  Outcome: Progressing  Flowsheets (Taken 8/1/2022 1308)  Discharge to home or other facility with appropriate resources: Identify barriers to discharge with patient and caregiver     Problem: Pain  Goal: Verbalizes/displays adequate comfort level or baseline comfort level  Outcome: Progressing     Problem: Safety - Adult  Goal: Free from fall injury  Outcome: Progressing     Problem: ABCDS Injury Assessment  Goal: Absence of physical injury  Outcome: Progressing     Problem: Gastrointestinal - Adult  Goal: Minimal or absence of nausea and vomiting  Outcome: Progressing  Goal: Maintains adequate nutritional intake  Outcome: Progressing

## 2022-08-01 NOTE — OP NOTE
[address]    Operative Report    DATE OF PROCEDURE: 8/1/2022  SURGEON: Dr. Jose Arguelles MD M.D. First Assistant: Duncan Hudson  Resident: Lizbeth Webster DO     PREOPERATIVE DIAGNOSES:   Multiple sclerosis ([address] G35    Morbid (severe) obesity E66.01    Hypertension I10    PVC's (premature ventricular contractions) I49.3       POSTOPERATIVE DIAGNOSES: Same   OPERATION:   1) Laparoscopic Robot assisted Sleeve Gastrectomy with LiVac liver retractor  2) EGD  ANESTHESIA: General endotracheal.   ESTIMATED BLOOD LOSS: 3 mL. SPECIMEN: stomach greater curvature  COMPLICATIONS: None. HISTORY: Sudhakar Crandall is a morbidly-obese 37 y.o.  female, who weighs 251 lb (113.9 kg). The Body mass index is 38.16 kg/m². She has multiple medical problems aggravated by her obesity. She wishes to have a sleeve gastrectomy so that she can lose more weight and keep the weight off. We discussed the extensive risks involved in the surgery including the risk of bleeding, infection, and needing further procedures. We also discussed wound infections, hernias and the risks of general anesthetic including MI, CVA, sudden death or reactions to anesthetic medications. The patient understood the risks. All questions were answered to the patient's satisfaction and they freely signed the consent and wished to proceed. PROCEDURE: The patient was placed on the table in the supine position and placed under general endotracheal anesthesia. She had SCDs on the legs as DVT prophylaxis. Ancef 2 g was given IV pre-op. An orogastric tube was placed in the stomach, then removed. The abdomen was shaved, then prepped with ChloroPrep and draped in the usual sterile fashion. 0.25% Marcaine plus epinephrine was injected into the skin and muscle of each incision to help with postoperative pain control. An 8-mm incision was made in the left mid-abdomen three hand breaths below the xyphoid.  The varies needle was used to enter the peritoneal cavity and the abdomen was insufflated with CO2. An 8 mm robot trocar was placed and the 8 mm 30 degree, 3D scope was inserted. A 15-mm trocar was placed through the right rectus sheath,  8-mm trocars were placed in the left mid and left lateral abdomen. The 15 mm trocar was removed and the LiVac liver retractor was placed and the 12 mm robot trocar was placed next to the tubing. The robot was docked, cadiere grasper placed through the 12, vessel sealer was placed on the left and another cadiere grasper through the left lateral trocar. Extensive scar tissue was taken down in the upper midline. The vessel sealer was used to remove the omental attachments and short gastric vessels on the greater curvature of the stomach, starting at the lower border and working all the way up around to the angle of His and then proceeding distally to approximately 4 cm from the pylorus. There were adhesions posteriorly, and these were also taken down. Anaesthesia placed a 40 F bougie along the lesser curvature down to the pylorus. The robotic 60 mm green cartridge was fired, starting approximately 4 cm from the pylorus, staying wide away from the angularis. The next stapler was a blue load, then three more blue loads fired vertically up toward the angle of His, steadily getting tighter to the bougie as each stapler was fired, completely  the resected greater curvature from the small sleeve of lesser curvature stomach. The bougie was removed and an endoscope was used to examine the staple line. The size of the sleeve looked uniform with no strictures or pouches. The staple lines were all air tight and water tight. The endoscope was withdrawn. The robot was undocked and the liver retractor removed. The resected stomach was grasped and brought out through the 15-mm trocar site. No pathology was seen. The fascia was not closed. All the CO2 was released.  The rest of the trocars were removed. The skin wounds were closed with 4-0 Monocryl dermal stitches. Skin-Afix glue was applied to each incision, no dressing. The needle and sponge count were correct. The patient tolerated the procedure well and went to recovery in stable condition. Plan: will keep over night for pain control. Home tomorrow on [address] [place] [address],[address] for pain, Zofran and Scopolamine for nausea, Carafate as ulcer prophylaxis. Physician Signature: Electronically signed by Dr. Ale Ruiz M.D.     PCP: RAUL Spaulding - CNP

## 2022-08-01 NOTE — DISCHARGE INSTRUCTIONS
Your information:  Name: Blair Cao  : 1980    Dr. Good Alcocer  Discharge Instructions   for Sleeve Gastrectomy       Home Care    The glue on the incision is waterproof so it is ok to shower. Do not remove the surgical glue for 2 weeks. Leave the incisions open to air, only cover if drainage occurs. Place ice on painful incisions for 1-2 days. Diet    You will be started on a Bariatric clear-liquid diet after surgery for two days, then advance to a high protein, full liquid diet for the next two weeks. Drink very slowly taking in 1 ounce every 15 minutes all day long. Aim for 60 grams of protein per day and try to get 60 to 90 ounces of fluids per day. Crush all pills larger than a Prilosec capsule. You may notice increased gas or changes in your bowel habits during the first month after surgery. Keep the bowels soft and moving daily with Metamucil, bran cereal, stool softeners or laxatives to prevent constipation pains. Physical Activity    Walk frequently and keep legs elevated when sitting to prevent blood clots in the legs. Do not do anything that causes pain in the incisions. Breath deeply every hour to prevent pneumonia. Medications    Restart blood thinners today if no sign of bleeding  The Scopolamine patch will help nausea for 3 days but may make the eyes blurry  Take Tylenol and Advil for pain. Crush all large pills. Make sure you take any medicines you were on for depression and anxiety. Follow-up   Schedule a follow-up appointment for two weeks, [phone]. Call Your Doctor If Any of the Following Occurs   Signs of infection, redness, swelling, increasing pain, excessive bleeding, or discharge at the incision site   Cough, shortness of breath, chest pain   Increased abdominal pain   Nausea and/or vomiting that does not resolve off narcotics.   Pain, burning, urgency or frequency of urination, or blood in the urine   Pain and/or swelling in your feet, calves, or legs     In case of an emergency,  CALL 911  immediately. Call 911 anytime you think you may need emergency care. For example, call if:    You passed out (lost consciousness). You are short of breath. Call your doctor now or seek immediate medical care if:    You have pain that does not get better after you take pain medicine. You cannot pass stool or gas. You are sick to your stomach and cannot drink fluids. You have loose stitches, or your incision comes open. You have signs of a blood clot, such as:  Pain in your calf, back of the knee, thigh, or groin. Redness and swelling in your leg or groin. You have signs of infection, such as: Increased pain, swelling, warmth, or redness. Red streaks leading from the incision. Pus draining from the incision. A fever. Watch closely for changes in your health, and be sure to contact your doctor ifyou have any problems. The following personal items were collected during your admission and were returned to you:    Belongings  Dental Appliances: None  Vision - Corrective Lenses: None  Hearing Aid: None  Clothing: Pants, Shirt, Socks, Undergarments, Footwear  Jewelry: None  Body Piercings Removed: N/A  Electronic Devices: Cell Phone,   Weapons (Notify Protective Services/Security): None  Other Valuables: At home  Home Medications: None  Valuables Given To: Family (Comment)  Provide Name(s) of Who Valuable(s) Were Given To: Denominational ()  Responsible person(s) in the waiting room: Denominational ()  Patient approves for provider to speak to responsible person post operatively: Yes    Information obtained by:  By signing below, I understand that if any problems occur once I leave the hospital I am to contact RAUL Mack CNP. I understand and acknowledge receipt of the instructions indicated above.

## 2022-08-01 NOTE — PROGRESS NOTES
Pharmacist Review and Automatic Dose Adjustment of Prophylactic Enoxaparin    *Review reason for admission/hospital problem list*    The reviewing pharmacist has made an adjustment to the ordered enoxaparin dose or converted to UFH per the approved [facility] protocol and table as identified below. Silvia Dennis is a 37 y.o. female. No results for input(s): CREATININE in the last 72 hours. Estimated Creatinine Clearance: 124 mL/min (based on SCr of 0.8 mg/dL). No results for input(s): HGB, HCT, PLT in the last 72 hours. No results for input(s): INR in the last 72 hours. Height:   Ht Readings from Last 1 Encounters:   07/22/22 5' 8\" (1.727 m)     Weight:  Wt Readings from Last 1 Encounters:   08/01/22 251 lb (113.9 kg)               Plan: Based upon the patient's weight and renal function, the ordered enoxaparin dose of 40 mg SQ daily has been changed/converted to Lovenox 30 mg SQ bid.       Thank you,  Kit Jain, [facility]  8/1/2022, 1:17 PM

## 2022-08-02 VITALS
HEIGHT: 68 IN | DIASTOLIC BLOOD PRESSURE: 56 MMHG | TEMPERATURE: 98.8 F | BODY MASS INDEX: 38.04 KG/M2 | WEIGHT: 251 LBS | HEART RATE: 84 BPM | RESPIRATION RATE: 16 BRPM | OXYGEN SATURATION: 96 % | SYSTOLIC BLOOD PRESSURE: 113 MMHG

## 2022-08-02 PROCEDURE — 6360000002 HC RX W HCPCS: Performed by: SURGERY

## 2022-08-02 PROCEDURE — 2580000003 HC RX 258: Performed by: SURGERY

## 2022-08-02 PROCEDURE — 6370000000 HC RX 637 (ALT 250 FOR IP): Performed by: SURGERY

## 2022-08-02 RX ADMIN — TIZANIDINE 2 MG: 4 TABLET ORAL at 08:14

## 2022-08-02 RX ADMIN — KETOROLAC TROMETHAMINE 30 MG: 30 INJECTION, SOLUTION INTRAMUSCULAR; INTRAVENOUS at 08:18

## 2022-08-02 RX ADMIN — KETOROLAC TROMETHAMINE 30 MG: 30 INJECTION, SOLUTION INTRAMUSCULAR; INTRAVENOUS at 01:08

## 2022-08-02 RX ADMIN — OXYCODONE 10 MG: 5 TABLET ORAL at 06:03

## 2022-08-02 RX ADMIN — ACETAMINOPHEN 650 MG: 160 SUSPENSION ORAL at 03:57

## 2022-08-02 RX ADMIN — ENOXAPARIN SODIUM 30 MG: 100 INJECTION SUBCUTANEOUS at 08:13

## 2022-08-02 RX ADMIN — PANTOPRAZOLE SODIUM 40 MG: 40 TABLET, DELAYED RELEASE ORAL at 06:03

## 2022-08-02 RX ADMIN — SODIUM CHLORIDE, POTASSIUM CHLORIDE, SODIUM LACTATE AND CALCIUM CHLORIDE: 600; 310; 30; 20 INJECTION, SOLUTION INTRAVENOUS at 03:57

## 2022-08-02 RX ADMIN — AMLODIPINE BESYLATE 5 MG: 5 TABLET ORAL at 08:14

## 2022-08-02 ASSESSMENT — PAIN SCALES - GENERAL
PAINLEVEL_OUTOF10: 8
PAINLEVEL_OUTOF10: 7
PAINLEVEL_OUTOF10: 4
PAINLEVEL_OUTOF10: 3
PAINLEVEL_OUTOF10: 8
PAINLEVEL_OUTOF10: 8

## 2022-08-02 ASSESSMENT — PAIN DESCRIPTION - DESCRIPTORS: DESCRIPTORS: ACHING;TENDER;SORE

## 2022-08-02 ASSESSMENT — PAIN DESCRIPTION - LOCATION
LOCATION: ABDOMEN

## 2022-08-02 ASSESSMENT — PAIN DESCRIPTION - ORIENTATION: ORIENTATION: LEFT

## 2022-08-02 NOTE — PLAN OF CARE
Problem: Discharge Planning  Goal: Discharge to home or other facility with appropriate resources  8/2/2022 0052 by Cathy Pyle RN  Outcome: Progressing  8/1/2022 1637 by Carina Andersen RN  Outcome: Progressing  Flowsheets (Taken 8/1/2022 1308)  Discharge to home or other facility with appropriate resources: Identify barriers to discharge with patient and caregiver     Problem: Pain  Goal: Verbalizes/displays adequate comfort level or baseline comfort level  8/2/2022 0052 by Cathy Pyle RN  Outcome: Progressing  8/1/2022 1637 by Carina Andersen RN  Outcome: Progressing     Problem: Safety - Adult  Goal: Free from fall injury  8/2/2022 0052 by Cathy Pyle RN  Outcome: Progressing  8/1/2022 1637 by Carina Andersen RN  Outcome: Progressing     Problem: ABCDS Injury Assessment  Goal: Absence of physical injury  8/2/2022 0052 by Cathy Pyle RN  Outcome: Progressing  8/1/2022 1637 by Carina Andersen RN  Outcome: Progressing     Problem: Gastrointestinal - Adult  Goal: Minimal or absence of nausea and vomiting  8/2/2022 0052 by Cathy Pyle RN  Outcome: Progressing  8/1/2022 1637 by Carina Andersen RN  Outcome: Progressing  Goal: Maintains adequate nutritional intake  8/2/2022 0052 by Cathy Pyle RN  Outcome: Progressing  8/1/2022 1637 by Carina Andersen RN  Outcome: Progressing

## 2022-08-02 NOTE — PROGRESS NOTES
GENERAL SURGERY  DAILY PROGRESS NOTE  8/2/2022    SUBJECTIVE:  Sore, but pain controlled w Rx  Tolerating clear diet  No tachycardia overnight    OBJECTIVE:  /63   Pulse 84   Temp 98.8 °F (37.1 °C) (Oral)   Resp 16   Ht 5' 8\" (1.727 m)   Wt 251 lb (113.9 kg)   LMP 07/24/2022   SpO2 96%   BMI 38.16 kg/m²     GENERAL:  NAD. A&Ox3. LUNGS:  No increased work of breathing. CARDIOVASCULAR: RR  ABDOMEN:  Soft, non-distended, appropriately tender. Incisions c/d/i. No guarding, rigidity, rebound.     ASSESSMENT/PLAN:  37 y.o. female s/p robotic sleeve gastrectomy 8/1    Doing well post-op  Ambulate, SMI  Continue liquid diet  Discharge today    Hossein Freire DO  Surgery Resident PGY-5  8/2/2022  7:59 AM

## 2022-08-03 ENCOUNTER — TELEPHONE (OUTPATIENT)
Dept: BARIATRICS/WEIGHT MGMT | Age: 42
End: 2022-08-03

## 2022-08-03 NOTE — TELEPHONE ENCOUNTER
Post op questions:    Nausea/vomiting present (YES/NO):no    Fever > 101/chills present (YES/NO):no    Tolerating liquids (YES/NO):yes  Ounces per day: 4 ounces/ hour while awake  Protein shakes:not yet    Taking post op medications, Carafate/Omeprazole & Zofran (YES/NO): yes    Is patient up and walking (YES/NO):yes    Having bowel movements (YES/NO):no    Are incisions healing well (YES/NO):yes    Having any problems or concerns that need to be addressed: none

## 2022-08-04 ENCOUNTER — TELEPHONE (OUTPATIENT)
Dept: BARIATRICS/WEIGHT MGMT | Age: 42
End: 2022-08-04

## 2022-08-04 NOTE — TELEPHONE ENCOUNTER
[address] [facility]:  RYGB or LSG - Dietary Phone Follow-up Form:  Sx Date:8/1/22    rs/p LSG    Current Diet:_____________Full liquids_________________       Patient's symptoms include the following:     Pt states  he / she has the following symptoms:    Nausea -  no  Vomiting -  No  Diarrhea -  no  Abdominal Cramping -  no  Gas -  yes,        Dizziness -  no   Fever - no //  Pt physically took his / her temperature today no,    Constipation -  no   Bms today and yesterday  Bloody Stools - no  Tarry Stools - no  Shakiness -  no   Low Blood Sugar -  no  Feels Faint -  no   Excessive Salvia -  no  Other lower back pain, usual pain in front where trocar sites are  Pt started his / her Colace 100 mgs twice daily -  yes  Pt is taking PPI Medications or Carafate as instructed -  yes     Hunger during the liquid phase no   Reports no restriction during the liquid phase no  Reports moderate restriction during liquid phase no  Reports severe restriction during liquid phase no  Can't tolerate liquids no  Food gets stuck frequently no    Reflux Symptoms no       24 Hour Recall:8/3/22  Breakfast: Gatorade protein   Snack: broth   Lunch: Gatorade zero   Snack:  broth  Dinner: broth  Snack: gatorade  Water Intake:34 oz   Other Beverage: 16 oz gatorade w/ protein, 12 oz Gatorade zero   Pt  states denies dehydration on today's date 8/4/22    Protein Supplement: Rafat Graves reviewed pt can start his / her protein supplement on today. Rafat Graves reviewed how pt can mix his / her protein supplement - 2 scoops Nectar mixed in 12 oz Fat Free Fair Life Milk or  12 oz of Water broken down into 4 meals 3 oz in size. Pt verbalized understanding. How many times a day do you take your Protein Supplement: 4   Pt says she is getting 80 grams protein daily  Reviewed how to take 3 oz per meal with pt     Instructed per Standing Orders: yes,  Bariatric Full Liquid Diet . Rafat Graves reviewed Bar Full Liquid Diet and reinforced diet concepts.  Pt has education book and handouts and states he/ she is following the instruction given. Use of protein supplement was reviewed with how to mix his / her protein supplement. Pt verbalized understanding. Pt instructed to call with any dietary questions. POC D/T problem noted above: pt has lower back pain, says 7 out of 10.     Surgeon Notified:  referred pt to bariatric RN - transferred pt's call to Alycia    Patient Response:  Verbalized understanding of the above instruction: yes,   Will keep detailed food records for 2 week f/u appt: yes,   Will return to [facility] for his her 2 week f/u appointment - Reminded pt to have labs drawn

## 2022-08-08 ENCOUNTER — HOSPITAL ENCOUNTER (OUTPATIENT)
Age: 42
Discharge: HOME | End: 2022-08-08
Payer: MEDICARE

## 2022-08-08 ENCOUNTER — TELEPHONE (OUTPATIENT)
Dept: BARIATRICS/WEIGHT MGMT | Age: 42
End: 2022-08-08

## 2022-08-08 DIAGNOSIS — K91.2 MALNUTRITION FOLLOWING GASTROINTESTINAL SURGERY: ICD-10-CM

## 2022-08-08 LAB
ALBUMIN SERPL-MCNC: 4.1 G/DL (ref 3.5–5.2)
ALP BLD-CCNC: 61 U/L (ref 35–104)
ALT SERPL-CCNC: 7 U/L (ref 0–32)
ANION GAP SERPL CALCULATED.3IONS-SCNC: 12 MMOL/L (ref 7–16)
AST SERPL-CCNC: 12 U/L (ref 0–31)
BILIRUB SERPL-MCNC: 0.5 MG/DL (ref 0–1.2)
BUN BLDV-MCNC: 14 MG/DL (ref 6–20)
CALCIUM SERPL-MCNC: 9 MG/DL (ref 8.6–10.2)
CHLORIDE BLD-SCNC: 103 MMOL/L (ref 98–107)
CO2: 24 MMOL/L (ref 22–29)
CREAT SERPL-MCNC: 0.7 MG/DL (ref 0.5–1)
GFR AFRICAN AMERICAN: >60
GFR NON-AFRICAN AMERICAN: >60 ML/MIN/1.73
GLUCOSE BLD-MCNC: 82 MG/DL (ref 74–99)
HCT VFR BLD CALC: 27.6 % (ref 34–48)
HEMOGLOBIN: 8.6 G/DL (ref 11.5–15.5)
MCH RBC QN AUTO: 27.5 PG (ref 26–35)
MCHC RBC AUTO-ENTMCNC: 31.2 % (ref 32–34.5)
MCV RBC AUTO: 88.2 FL (ref 80–99.9)
PDW BLD-RTO: 14.8 FL (ref 11.5–15)
PLATELET # BLD: 273 E9/L (ref 130–450)
PMV BLD AUTO: 11.9 FL (ref 7–12)
POTASSIUM SERPL-SCNC: 3.4 MMOL/L (ref 3.5–5)
RBC # BLD: 3.13 E12/L (ref 3.5–5.5)
SODIUM BLD-SCNC: 139 MMOL/L (ref 132–146)
TOTAL PROTEIN: 6.8 G/DL (ref 6.4–8.3)
WBC # BLD: 12.1 E9/L (ref 4.5–11.5)

## 2022-08-08 PROCEDURE — 85027 COMPLETE CBC AUTOMATED: CPT

## 2022-08-08 PROCEDURE — 36415 COLL VENOUS BLD VENIPUNCTURE: CPT

## 2022-08-08 PROCEDURE — 80053 COMPREHEN METABOLIC PANEL: CPT

## 2022-08-08 NOTE — TELEPHONE ENCOUNTER
Post op questions:    Nausea/vomiting present (YES/NO):no    Fever > 101/chills present (YES/NO):no    Tolerating liquids (YES/NO):yes  Ounces per day: 64  Protein shakes:yes    Taking post op medications, Carafate/Omeprazole & Zofran (YES/NO): yes    Is patient up and walking (YES/NO):yes    Having bowel movements (YES/NO):yes    Are incisions healing well (YES/NO):yes    Having any problems or concerns that need to be addressed: none

## 2022-08-09 NOTE — TELEPHONE ENCOUNTER
Spoke with patient , who states that she is now seeing Dr Romi Walker.  States she is having a difficult time reaching their office and needs a refill for Tizanidine

## 2022-08-10 RX ORDER — TIZANIDINE 4 MG/1
TABLET ORAL
Qty: 540 TABLET | Refills: 3 | Status: SHIPPED | OUTPATIENT
Start: 2022-08-10 | End: 2023-02-06

## 2022-08-11 ENCOUNTER — OFFICE VISIT (OUTPATIENT)
Dept: BARIATRICS/WEIGHT MGMT | Age: 42
End: 2022-08-11
Payer: MEDICARE

## 2022-08-11 ENCOUNTER — OFFICE VISIT (OUTPATIENT)
Dept: BARIATRICS/WEIGHT MGMT | Age: 42
End: 2022-08-11

## 2022-08-11 VITALS
HEIGHT: 68 IN | DIASTOLIC BLOOD PRESSURE: 71 MMHG | HEART RATE: 84 BPM | TEMPERATURE: 96.8 F | WEIGHT: 239 LBS | BODY MASS INDEX: 36.22 KG/M2 | RESPIRATION RATE: 20 BRPM | SYSTOLIC BLOOD PRESSURE: 145 MMHG

## 2022-08-11 VITALS — WEIGHT: 239 LBS | BODY MASS INDEX: 36.22 KG/M2 | HEIGHT: 68 IN

## 2022-08-11 DIAGNOSIS — E66.01 MORBID OBESITY (HCC): Primary | ICD-10-CM

## 2022-08-11 DIAGNOSIS — D50.0 IRON DEFICIENCY ANEMIA DUE TO CHRONIC BLOOD LOSS: ICD-10-CM

## 2022-08-11 DIAGNOSIS — Z71.3 DIETARY COUNSELING: Primary | ICD-10-CM

## 2022-08-11 PROCEDURE — 99212 OFFICE O/P EST SF 10 MIN: CPT

## 2022-08-11 PROCEDURE — 99999 PR OFFICE/OUTPT VISIT,PROCEDURE ONLY: CPT

## 2022-08-11 PROCEDURE — 99024 POSTOP FOLLOW-UP VISIT: CPT | Performed by: SURGERY

## 2022-08-11 NOTE — PATIENT INSTRUCTIONS
Please continue to take your vitamin and mineral supplements as instructed. If you received a blood work prescription today for laboratory monitoring due prior to your next routine follow-up visit, please have this blood work obtained 10 to 14 days prior to your next visit. It is important to fast for 12 hours prior to routine weight loss surgery blood work, EXCEPT for drinking water, to ensure accuracy of results. Please report nausea, vomiting, abdominal pain, or any other problems you experience to your surgeon. For problems related to weight loss surgery, it is best to go to [address] [place] [address] Emergency Department and have your surgeon paged.

## 2022-08-11 NOTE — PROGRESS NOTES
needed for Nausea or Vomiting  Budd Councilman, MD   cetirizine (ZYRTEC) 10 MG tablet TAKE 1 TABLET BY MOUTH EVERY DAY  Historical Provider, MD   amLODIPine (NORVASC) 5 MG tablet TAKE 1 TABLET BY MOUTH EVERY DAY  Historical Provider, MD   [facility] D3 50 MCG (2000 UT) CAPS TAKE 1 CAPSULE BY MOUTH ONCE  Slade Pimple., MD   traZODone (DESYREL) 150 MG tablet Take 150 mg by mouth nightly  Historical Provider, MD   ferrous sulfate (FE TABS 325) 325 (65 Fe) MG EC tablet Take 325 mg by mouth in the morning. Historical Provider, MD       Allergies: Kcentra [prothrombin complex conc human], Tecfidera [dimethyl fumarate], Ventanilla De Keiry 56 grass allergy skin test, and Ocrelizumab   Social History:   TOBACCO:   reports that she quit smoking about 4 years ago. Her smoking use included cigarettes. She smoked an average of .5 packs per day. She has never used smokeless tobacco.  All smokers must join the free smoking cessation program and stop smoking for 3 months before having any Bariatric surgery. ETOH:    reports that she does not currently use alcohol. Family History   Problem Relation Age of Onset    Heart Disease Mother     High Blood Pressure Mother     Heart Attack Father      Review of Systems:  Psychiatric:  depression and anxiety  Respiratory: negative  Cardiovascular: negative  Gastrointestinal: negative  Musculoskeletal:negative  All others reviewed, negative    Physical Exam:   VITALS: Blood pressure (!) 145/71, pulse 84, temperature 96.8 °F (36 °C), temperature source Temporal, resp. rate 20, height 5' 8\" (1.727 m), weight 239 lb (108.4 kg), last menstrual period 07/24/2022.   General appearance: alert, appears stated age and cooperative, does ambulate easily  Head: Normocephalic, without obvious abnormality, atraumatic  Eyes: PERRL  Ears/mouth/throat:  Ears clear, mouth normal, throat no redness  Neck: no adenopathy, no JVD, supple, symmetrical, trachea midline and thyroid not enlarged  Lungs: clear to auscultation bilaterally  Heart: regular rate and rhythm  Abdomen: incisions healing well, glue in place, no cellulitis or drainage  Extremities: extremities normal, atraumatic, no cyanosis or edema  Skin: no open wounds    Labs: normal other than low Hgb=8.6, down from 12.2 preop, currently on iron pills daily. Assessment:  Blood loss anemia post op, currently on iron pills. No nausea, tolerating the diet well. Plan:   Start the MVI, iron, calcium and Vit D, stay on iron pills. Walk as much as possible but keep your legs elevated when sitting. Continue deep breathing exercizes. Transition to the high protein Pureed-liquid diet. Continue drinking 1 oz every 10-15 minutes to prevent dehydration. Slowly increase this amount as tolerated. Aim for 60 gm Protein and 90 oz of liquids per day. Slow down if you feel chest pressure, acid or fullness. Track protein and fluid intake and bring to your next appointment. Repeat labs before the next visit. Make sure the bowel move daily by taking Gummy fiber and fluids. Follow up in 4 weeks.        Physician Signature: Electronically signed by Dr. Alpa Lloyd MD

## 2022-08-11 NOTE — PROGRESS NOTES
Medical Nutrition Therapy (MNT) Assessment     Pt. Name: Sascha Thomas   Date:8/11/2022  F/U Appt: Sx Type 2 week LSG       Ht 5' 8\" (1.727 m)   Wt 239 lb (108.4 kg)   LMP 07/24/2022   BMI 36.34 kg/m²  Height: 5' 8\" (1.727 m) Weight: 239 lb (108.4 kg)   IBW:ideal body weight   167 lb % EBWL: 16%     Wt Readings from Last 3 Encounters:   08/11/22 239 lb (108.4 kg)   08/11/22 239 lb (108.4 kg)   08/01/22 251 lb (113.9 kg)                     Protein supplements: Pt. is currently using the following protein supplement Gatorade protein, whey protein isolate and consuming the following grams of protein 76 or more. Rd / Ld reviewed with patient based on 1.0 gram per kg of IBW patient needs 76-86 grams of protein total daily. Subjective:                   Current MNT:       Bariatric full liquid diet with MVI, Calcium & Protein Supplements     MNT Advanced to:     Bariatric puree diet with MVI, Calcium & Protein Supplements      Allergies and Food Allergies and Food Intolerances:none    14 & [place] Bowel Protocol:  no - Diarrhea  No - Constipation  How much plain water are you drinking daily 67 oz        Gatorade zero and Gatorade protein  No - Are you taking Colace daily  What amount of Colace are you taking daily prn  No - Are you taking Sugar Free Chewable Fiber Gummies  What amount of Sugar Free Chewable Fiber Gummies are you taking daily none       No - Any pain or problems you are currently experiencing? How many meals a day 6 / Portions Sizes of Meals are 3 oz         8/8/22 Labs: potassium 3.4L, WBC 12.1H, RBC 3.13L, Hgb 8.6L, Hct 27.6L    Supplements: not yet     Estimated Daily Nutritional Needs: Based on Bariatric procedure for Wt.  Loss  Energy: Will be calculated at Maintenance Stage    Protein: 60 - 80 gms Daily    MNT Plan and Additional Education: pt takes iron per PCP    MEDICAL NUTRITION THERAPY (MNT) - Nutrition Care Plan   (The patient has been educated and given written education material that reinforces the following dietary guidelines for Bariatric Surgery)  Goal:  Patient able to verbalize the following dietary concepts:  3 to 4 ounce portions per meal     6 small meals daily      60 to 80 grams of protein   48 to 64 ounces of fluid daily (water)     Always consume protein item first with all meals   Pt is aware wt loss is pt controlled. Slow Meals - 30-35 chews per bite / Meals 30 - 45 minutes long            The RD / LD reviewed with the patient that the dietary goals of the bariatric patient is to ensure that patient is able to meet established nutritional needs for a Bariatric Surgery  Patient at this time in order to promote healing, prevent significant weight changes, prevent skin breakdown and abnormal lab values, prevent complications, and tolerance to diet and texture of foods. Pt is able to identify proper food choices and needed changes and is able to explain proper food preparation. RD / LD reviewed compliance with assigned diet stages, volume of food and drink consumed, timing of meals, amount of protein and energy intake, daily vitamin and mineral supplementation, identify food cravings and any adverse reactions associated with intake of food and drink. RD / LD uses behavioral tools such as goal setting, MI, and self-monitoring, to reinforce the anatomic and physiologic effects of the surgery, so that the described behaviors become more of a habit not simply a reaction to the altered anatomy. Care Plan:   Rd/Ld Addressed Food Records or 24-Hour Recall  Rd / Ld encouraged patient to exercise at least 30 minutes daily  Rd / Ld instructed patient on how to increase oral protein intake within the diet. Pt. can verbalize he / she will need to consume 60 - 80 grams. Rd / Ld instructed the patient on how to increase the use of protein supplements within the diet. Pt. was instructed on how to increase water intake.  Patient will need to consume 48 - 64 oz. of just plain water in addition to 30 oz. of non-caloric beverages. Handouts given to patient  RD / LD encouraged oral intake    RD / LD encouraged pt. to keep food records.       Pt. is able to verbalize diet concepts         Yes - Pt. diet was advanced to the following stage ( See above)     Good - Dietary Compliance

## 2022-08-15 DIAGNOSIS — K21.9 GASTROESOPHAGEAL REFLUX DISEASE WITHOUT ESOPHAGITIS: ICD-10-CM

## 2022-08-15 RX ORDER — OMEPRAZOLE 20 MG/1
CAPSULE, DELAYED RELEASE ORAL
Qty: 30 CAPSULE | Refills: 12 | Status: SHIPPED | OUTPATIENT
Start: 2022-08-15

## 2022-09-15 ENCOUNTER — TELEPHONE (OUTPATIENT)
Dept: BARIATRICS/WEIGHT MGMT | Age: 42
End: 2022-09-15

## 2022-09-15 NOTE — TELEPHONE ENCOUNTER
Pt was scheduled for her 6 week post op LRYGB dietary appt today but did not show. Called pt and she was unavailable. LM asking pt to please call [phone] to complete the appt today or to reschedule.

## 2022-10-05 ENCOUNTER — TELEPHONE (OUTPATIENT)
Dept: NEUROLOGY | Age: 42
End: 2022-10-05

## 2022-10-25 ENCOUNTER — TELEPHONE (OUTPATIENT)
Dept: BARIATRICS/WEIGHT MGMT | Age: 42
End: 2022-10-25

## 2022-10-25 NOTE — TELEPHONE ENCOUNTER
Called pt to schedule her 3 mos LSG follow up with Dr Linh Garcia. Pt missed her 6 week follow up. Pt was not available. LM on voicemail to please call [phone] to schedule.

## 2022-11-01 DIAGNOSIS — K91.2 MALNUTRITION FOLLOWING GASTROINTESTINAL SURGERY: Primary | ICD-10-CM

## 2022-12-12 NOTE — TELEPHONE ENCOUNTER
HISTORY OF PRESENT ILLNESS  Gaston Katz is a 26 y.o. female. Here to establish care   HPI  1) Establish care     2) Constipation - this has been occurring the last couple months-  has BM's 1 x a week - adds fiber to diet and drinks water - denies having urge to go - BM's are hard and reports blood in toilet paper - pain with bowel movements. 3) Fatigue - this is since a couple months - comes and goes - worse since removing birth control     /74 (BP 1 Location: Right arm, BP Patient Position: Sitting)   Pulse 91   Temp 97.7 °F (36.5 °C) (Temporal)   Resp 18   Ht 5' 1\" (1.549 m)   Wt 159 lb (72.1 kg)   SpO2 96%   BMI 30.04 kg/m²     Review of Systems   Constitutional:  Positive for malaise/fatigue. Negative for chills and fever. Respiratory:  Negative for cough, shortness of breath and wheezing. Cardiovascular:  Negative for chest pain, palpitations and leg swelling. Gastrointestinal:  Positive for constipation and heartburn. Negative for abdominal pain, diarrhea, nausea and vomiting. Physical Exam  Vitals and nursing note reviewed. Constitutional:       General: She is not in acute distress. Appearance: She is obese. She is not ill-appearing. HENT:      Head: Normocephalic. Right Ear: Tympanic membrane, ear canal and external ear normal.      Left Ear: Tympanic membrane, ear canal and external ear normal.      Nose: Nose normal.      Mouth/Throat:      Mouth: Mucous membranes are moist.      Pharynx: Oropharynx is clear. Comments: MASK  Eyes:      General: No scleral icterus. Extraocular Movements: Extraocular movements intact. Conjunctiva/sclera: Conjunctivae normal.      Pupils: Pupils are equal, round, and reactive to light. Cardiovascular:      Rate and Rhythm: Normal rate and regular rhythm. Pulses: Normal pulses. Heart sounds: Normal heart sounds. Pulmonary:      Effort: Pulmonary effort is normal. No respiratory distress.       Breath Pts last dose of Ocrevus was Jan 2021. Last does of Tecfidera was 7/28/21.   Dose of Kesimpta : anytime after 8/28/21 sounds: Normal breath sounds. No wheezing. Abdominal:      General: Abdomen is flat. Bowel sounds are normal. There is no distension. Palpations: Abdomen is soft. There is no mass. Tenderness: There is abdominal tenderness. There is no guarding or rebound. Hernia: No hernia is present. Musculoskeletal:         General: Normal range of motion. Cervical back: Normal range of motion. Right lower leg: No edema. Left lower leg: No edema. Lymphadenopathy:      Cervical: No cervical adenopathy. Skin:     General: Skin is warm and dry. Findings: No lesion or rash. Neurological:      General: No focal deficit present. Mental Status: She is alert and oriented to person, place, and time. Psychiatric:         Mood and Affect: Mood normal.         Behavior: Behavior normal.         Thought Content: Thought content normal.         Judgment: Judgment normal.       ASSESSMENT and PLAN  Diagnoses and all orders for this visit:    1. Constipation, unspecified constipation type  -     polyethylene glycol (MIRALAX) 17 gram/dose powder; Take 17 g by mouth daily. 2. Pain with bowel movements  -     REFERRAL TO GASTROENTEROLOGY    3. Fatigue, unspecified type  -     CBC WITH AUTOMATED DIFF; Future  -     METABOLIC PANEL, COMPREHENSIVE; Future  -     T4, FREE; Future  -     TSH 3RD GENERATION; Future  -     VITAMIN D, 25 HYDROXY; Future    4. Gastroesophageal reflux disease, unspecified whether esophagitis present  -     REFERRAL TO GASTROENTEROLOGY    5. Encounter for medical examination to establish care  -     HEMOGLOBIN A1C WITH EAG; Future  -     URINALYSIS W/ RFLX MICROSCOPIC;  Future  Constipation - start fiber and miralax daily - set routine for daily BM's   Pain with bowel movements - referral to GI for evaluation   Fatigue - work up  GERD- continue lifestyle changes - would like to try diet changes first per patient   Labs for baseline   Follow-up and Dispositions    Return in about 4 weeks (around 1/9/2023) for constipation .

## 2022-12-28 ENCOUNTER — TELEPHONE (OUTPATIENT)
Dept: BARIATRICS/WEIGHT MGMT | Age: 42
End: 2022-12-28

## 2023-01-03 ENCOUNTER — TELEPHONE (OUTPATIENT)
Dept: BARIATRICS/WEIGHT MGMT | Age: 43
End: 2023-01-03

## 2023-04-29 ENCOUNTER — HOSPITAL ENCOUNTER (EMERGENCY)
Age: 43
Discharge: HOME | End: 2023-04-29
Payer: MEDICARE

## 2023-04-29 VITALS
RESPIRATION RATE: 18 BRPM | BODY MASS INDEX: 25.76 KG/M2 | OXYGEN SATURATION: 99 % | SYSTOLIC BLOOD PRESSURE: 125 MMHG | WEIGHT: 170 LBS | HEART RATE: 86 BPM | TEMPERATURE: 98.8 F | DIASTOLIC BLOOD PRESSURE: 69 MMHG | HEIGHT: 68 IN

## 2023-04-29 DIAGNOSIS — K02.9 PAIN DUE TO DENTAL CARIES: Primary | ICD-10-CM

## 2023-04-29 PROCEDURE — 99283 EMERGENCY DEPT VISIT LOW MDM: CPT

## 2023-04-29 RX ORDER — PENICILLIN V POTASSIUM 500 MG/1
500 TABLET ORAL 4 TIMES DAILY
Qty: 40 TABLET | Refills: 0 | Status: SHIPPED | OUTPATIENT
Start: 2023-04-29 | End: 2023-05-09

## 2023-04-29 RX ORDER — IBUPROFEN 800 MG/1
800 TABLET ORAL EVERY 6 HOURS PRN
Qty: 21 TABLET | Refills: 0 | Status: SHIPPED | OUTPATIENT
Start: 2023-04-29

## 2023-04-29 NOTE — ED PROVIDER NOTES
[facility]  ED  Encounter Note  Admit Date/RoomTime: 4/29/2023  6:57 PM  ED Room: 36/36   Independent SAVAGE Visit. HPI: Bev Cisneros 40 y.o. female with a past medical history of   Past Medical History:   Diagnosis Date    Asthma due to environmental allergies     Hypertension     Morbid (severe) obesity     Morbid obesity due to excess calories ([address]     Multiple sclerosis ([address]     Numbness and tingling of right lower extremity     presents with a complaint of dental pain. The patient states this pain has been gradual in onset, persistent, moderate in severity and worse today which is what prompted the visit. Pain has not been relieved with any OTC medications. Patient denies any unilateral facial swelling. Patient is able to handle their own secretions and drink fluids without difficulty. Patient denies any fever. The patient also denies any history of dental trauma. Denies difficulty breathing or swallowing. The location of the pain and appears to be isolated over tooth number 13. States she had tooth #12 extracted about a year ago and the dentist broke tooth #13 and she is now having pain and swelling to the left side of her face. Able to eat and drink normally. No fever. ROS:   Pertinent positives and negatives are stated within HPI, all other systems reviewed and are negative.  --------------------------------------------- PAST HISTORY ---------------------------------------------  Past Medical History:  has a past medical history of Asthma due to environmental allergies, Hypertension, Morbid (severe) obesity, Morbid obesity due to excess calories ([address] Multiple sclerosis ([address] and Numbness and tingling of right lower extremity. Past Surgical History:  has a past surgical history that includes Tubal ligation; Tympanostomy tube placement; Cholecystectomy;  Endoscopy, colon, diagnostic; Upper gastrointestinal endoscopy (N/A, 1/26/2022);

## 2023-07-18 ENCOUNTER — TELEPHONE (OUTPATIENT)
Dept: BARIATRICS/WEIGHT MGMT | Age: 43
End: 2023-07-18

## 2023-07-18 DIAGNOSIS — K91.2 MALNUTRITION FOLLOWING GASTROINTESTINAL SURGERY: Primary | ICD-10-CM

## 2023-07-18 NOTE — TELEPHONE ENCOUNTER
Patient is due for their annual follow up and needs scheduled. I called the patient and left a detailed voicemail to call our office back.  A letter was also sent to the patient,

## 2023-07-26 ENCOUNTER — HOSPITAL ENCOUNTER (OUTPATIENT)
Age: 43
Discharge: HOME | End: 2023-07-26
Payer: MEDICARE

## 2023-07-26 DIAGNOSIS — K91.2 MALNUTRITION FOLLOWING GASTROINTESTINAL SURGERY: ICD-10-CM

## 2023-07-26 LAB
25(OH)D3 SERPL-MCNC: 28.8 NG/ML (ref 30–100)
ALBUMIN SERPL-MCNC: 4.5 G/DL (ref 3.5–5.2)
ALP SERPL-CCNC: 64 U/L (ref 35–104)
ALT SERPL-CCNC: 12 U/L (ref 0–32)
ANION GAP SERPL CALCULATED.3IONS-SCNC: 11 MMOL/L (ref 7–16)
AST SERPL-CCNC: 18 U/L (ref 0–31)
BILIRUB SERPL-MCNC: 0.3 MG/DL (ref 0–1.2)
BUN SERPL-MCNC: 12 MG/DL (ref 6–20)
CALCIUM SERPL-MCNC: 9.2 MG/DL (ref 8.6–10.2)
CHLORIDE SERPL-SCNC: 106 MMOL/L (ref 98–107)
CO2 SERPL-SCNC: 23 MMOL/L (ref 22–29)
CREAT SERPL-MCNC: 0.7 MG/DL (ref 0.5–1)
FERRITIN SERPL-MCNC: 6 NG/ML
FOLATE SERPL-MCNC: <2 NG/ML (ref 4.8–24.2)
GFR SERPL CREATININE-BSD FRML MDRD: >60 ML/MIN/1.73M2
GLUCOSE SERPL-MCNC: 54 MG/DL (ref 74–99)
POTASSIUM SERPL-SCNC: 4 MMOL/L (ref 3.5–5)
PREALB SERPL-MCNC: 17 MG/DL (ref 20–40)
PROT SERPL-MCNC: 7.2 G/DL (ref 6.4–8.3)
SODIUM SERPL-SCNC: 140 MMOL/L (ref 132–146)
VIT B12 SERPL-MCNC: <150 PG/ML (ref 211–946)

## 2023-07-26 PROCEDURE — 82306 VITAMIN D 25 HYDROXY: CPT

## 2023-07-26 PROCEDURE — 82746 ASSAY OF FOLIC ACID SERUM: CPT

## 2023-07-26 PROCEDURE — 84425 ASSAY OF VITAMIN B-1: CPT

## 2023-07-26 PROCEDURE — 82525 ASSAY OF COPPER: CPT

## 2023-07-26 PROCEDURE — 84590 ASSAY OF VITAMIN A: CPT

## 2023-07-26 PROCEDURE — 80053 COMPREHEN METABOLIC PANEL: CPT

## 2023-07-26 PROCEDURE — 36415 COLL VENOUS BLD VENIPUNCTURE: CPT

## 2023-07-26 PROCEDURE — 84630 ASSAY OF ZINC: CPT

## 2023-07-26 PROCEDURE — 85027 COMPLETE CBC AUTOMATED: CPT

## 2023-07-26 PROCEDURE — 82728 ASSAY OF FERRITIN: CPT

## 2023-07-26 PROCEDURE — 82607 VITAMIN B-12: CPT

## 2023-07-26 PROCEDURE — 84134 ASSAY OF PREALBUMIN: CPT

## 2023-07-27 LAB
ERYTHROCYTE [DISTWIDTH] IN BLOOD BY AUTOMATED COUNT: 18.6 % (ref 11.5–15)
HCT VFR BLD AUTO: 30.8 % (ref 34–48)
HGB BLD-MCNC: 8.1 G/DL (ref 11.5–15.5)
MCH RBC QN AUTO: 19.1 PG (ref 26–35)
MCHC RBC AUTO-ENTMCNC: 26.3 G/DL (ref 32–34.5)
MCV RBC AUTO: 72.8 FL (ref 80–99.9)
PLATELET, FLUORESCENCE: 176 K/UL (ref 130–450)
PMV BLD AUTO: 12.2 FL (ref 7–12)
RBC # BLD AUTO: 4.23 M/UL (ref 3.5–5.5)
WBC OTHER # BLD: 8.1 K/UL (ref 4.5–11.5)

## 2023-07-29 LAB
COPPER SERPL-MCNC: 135.3 UG/DL (ref 80–155)
VIT B1 PYROPHOSHATE BLD-SCNC: 113 NMOL/L (ref 70–180)
ZINC SERPL-MCNC: 84.7 UG/DL (ref 60–120)

## 2023-07-30 LAB
RETINYL PALMITATE: <0.02 MG/L (ref 0–0.1)
VITAMIN A LEVEL: 0.34 MG/L (ref 0.3–1.2)
VITAMIN A, INTERP: NORMAL

## 2023-08-17 ENCOUNTER — TELEPHONE (OUTPATIENT)
Dept: BARIATRICS/WEIGHT MGMT | Age: 43
End: 2023-08-17

## 2023-08-17 NOTE — TELEPHONE ENCOUNTER
Patient did not show for an appointment today with Eamon Herzog. I called and left a voice message for the patient to please call and reschedule at their earliest convenience.

## 2023-10-06 ENCOUNTER — APPOINTMENT (OUTPATIENT)
Dept: GENERAL RADIOLOGY | Age: 43
DRG: 194 | End: 2023-10-06
Payer: MEDICARE

## 2023-10-06 ENCOUNTER — APPOINTMENT (OUTPATIENT)
Dept: CT IMAGING | Age: 43
DRG: 194 | End: 2023-10-06
Payer: MEDICARE

## 2023-10-06 ENCOUNTER — HOSPITAL ENCOUNTER (INPATIENT)
Age: 43
LOS: 3 days | Discharge: HOME | DRG: 194 | End: 2023-10-09
Attending: STUDENT IN AN ORGANIZED HEALTH CARE EDUCATION/TRAINING PROGRAM | Admitting: FAMILY MEDICINE
Payer: MEDICARE

## 2023-10-06 DIAGNOSIS — D64.9 ANEMIA REQUIRING TRANSFUSIONS: Primary | ICD-10-CM

## 2023-10-06 DIAGNOSIS — J18.9 PNEUMONIA OF BOTH UPPER LOBES DUE TO INFECTIOUS ORGANISM: ICD-10-CM

## 2023-10-06 DIAGNOSIS — D50.9 IRON DEFICIENCY ANEMIA, UNSPECIFIED IRON DEFICIENCY ANEMIA TYPE: ICD-10-CM

## 2023-10-06 PROBLEM — J15.9 COMMUNITY ACQUIRED BACTERIAL PNEUMONIA: Status: ACTIVE | Noted: 2023-10-06

## 2023-10-06 LAB
ALBUMIN SERPL-MCNC: 3.5 G/DL (ref 3.5–5.2)
ALP SERPL-CCNC: 77 U/L (ref 35–104)
ALT SERPL-CCNC: 8 U/L (ref 0–32)
ANION GAP SERPL CALCULATED.3IONS-SCNC: 9 MMOL/L (ref 7–16)
AST SERPL-CCNC: 12 U/L (ref 0–31)
BASOPHILS # BLD: 0.12 K/UL (ref 0–0.2)
BASOPHILS NFR BLD: 1 % (ref 0–2)
BILIRUB SERPL-MCNC: <0.2 MG/DL (ref 0–1.2)
BUN SERPL-MCNC: 10 MG/DL (ref 6–20)
CALCIUM SERPL-MCNC: 8.7 MG/DL (ref 8.6–10.2)
CHLORIDE SERPL-SCNC: 105 MMOL/L (ref 98–107)
CO2 SERPL-SCNC: 27 MMOL/L (ref 22–29)
CREAT SERPL-MCNC: 0.6 MG/DL (ref 0.5–1)
D DIMER: 1554 NG/ML DDU (ref 0–232)
EOSINOPHIL # BLD: 2.26 K/UL (ref 0.05–0.5)
EOSINOPHILS RELATIVE PERCENT: 17 % (ref 0–6)
ERYTHROCYTE [DISTWIDTH] IN BLOOD BY AUTOMATED COUNT: 18.6 % (ref 11.5–15)
GFR SERPL CREATININE-BSD FRML MDRD: >60 ML/MIN/1.73M2
GLUCOSE SERPL-MCNC: 98 MG/DL (ref 74–99)
HCG, URINE, POC: NEGATIVE
HCT VFR BLD AUTO: 24.2 % (ref 34–48)
HGB BLD-MCNC: 6.6 G/DL (ref 11.5–15.5)
LYMPHOCYTES NFR BLD: 1.19 K/UL (ref 1.5–4)
LYMPHOCYTES RELATIVE PERCENT: 9 % (ref 20–42)
Lab: NORMAL
MCH RBC QN AUTO: 18.2 PG (ref 26–35)
MCHC RBC AUTO-ENTMCNC: 27.3 G/DL (ref 32–34.5)
MCV RBC AUTO: 66.7 FL (ref 80–99.9)
MONOCYTES NFR BLD: 0.48 K/UL (ref 0.1–0.95)
MONOCYTES NFR BLD: 4 % (ref 2–12)
NEGATIVE QC PASS/FAIL: NORMAL
NEUTROPHILS NFR BLD: 69 % (ref 43–80)
NEUTS SEG NFR BLD: 9.16 K/UL (ref 1.8–7.3)
PLATELET # BLD AUTO: 502 K/UL (ref 130–450)
PMV BLD AUTO: 9.7 FL (ref 7–12)
POSITIVE QC PASS/FAIL: NORMAL
POTASSIUM SERPL-SCNC: 3.9 MMOL/L (ref 3.5–5)
PROT SERPL-MCNC: 7.5 G/DL (ref 6.4–8.3)
RBC # BLD AUTO: 3.63 M/UL (ref 3.5–5.5)
RBC # BLD: ABNORMAL 10*6/UL
SODIUM SERPL-SCNC: 141 MMOL/L (ref 132–146)
TROPONIN I SERPL HS-MCNC: <6 NG/L (ref 0–9)
WBC OTHER # BLD: 13.2 K/UL (ref 4.5–11.5)

## 2023-10-06 PROCEDURE — 2140000000 HC CCU INTERMEDIATE R&B

## 2023-10-06 PROCEDURE — P9016 RBC LEUKOCYTES REDUCED: HCPCS

## 2023-10-06 PROCEDURE — 86900 BLOOD TYPING SEROLOGIC ABO: CPT

## 2023-10-06 PROCEDURE — 36430 TRANSFUSION BLD/BLD COMPNT: CPT

## 2023-10-06 PROCEDURE — 86923 COMPATIBILITY TEST ELECTRIC: CPT

## 2023-10-06 PROCEDURE — 2580000003 HC RX 258

## 2023-10-06 PROCEDURE — 6360000004 HC RX CONTRAST MEDICATION: Performed by: RADIOLOGY

## 2023-10-06 PROCEDURE — 6360000002 HC RX W HCPCS: Performed by: FAMILY MEDICINE

## 2023-10-06 PROCEDURE — 87449 NOS EACH ORGANISM AG IA: CPT

## 2023-10-06 PROCEDURE — 99285 EMERGENCY DEPT VISIT HI MDM: CPT

## 2023-10-06 PROCEDURE — 2580000003 HC RX 258: Performed by: FAMILY MEDICINE

## 2023-10-06 PROCEDURE — 87205 SMEAR GRAM STAIN: CPT

## 2023-10-06 PROCEDURE — 85379 FIBRIN DEGRADATION QUANT: CPT

## 2023-10-06 PROCEDURE — 86850 RBC ANTIBODY SCREEN: CPT

## 2023-10-06 PROCEDURE — 87070 CULTURE OTHR SPECIMN AEROBIC: CPT

## 2023-10-06 PROCEDURE — 71046 X-RAY EXAM CHEST 2 VIEWS: CPT

## 2023-10-06 PROCEDURE — 85025 COMPLETE CBC W/AUTO DIFF WBC: CPT

## 2023-10-06 PROCEDURE — 71275 CT ANGIOGRAPHY CHEST: CPT

## 2023-10-06 PROCEDURE — 86901 BLOOD TYPING SEROLOGIC RH(D): CPT

## 2023-10-06 PROCEDURE — 84484 ASSAY OF TROPONIN QUANT: CPT

## 2023-10-06 PROCEDURE — 2500000003 HC RX 250 WO HCPCS

## 2023-10-06 PROCEDURE — 80053 COMPREHEN METABOLIC PANEL: CPT

## 2023-10-06 PROCEDURE — 87899 AGENT NOS ASSAY W/OPTIC: CPT

## 2023-10-06 RX ORDER — SODIUM CHLORIDE 9 MG/ML
INJECTION, SOLUTION INTRAVENOUS PRN
Status: DISCONTINUED | OUTPATIENT
Start: 2023-10-06 | End: 2023-10-07

## 2023-10-06 RX ADMIN — DOXYCYCLINE 100 MG: 100 INJECTION, POWDER, LYOPHILIZED, FOR SOLUTION INTRAVENOUS at 23:04

## 2023-10-06 RX ADMIN — IOPAMIDOL 75 ML: 755 INJECTION, SOLUTION INTRAVENOUS at 19:39

## 2023-10-06 RX ADMIN — WATER 1000 MG: 1 INJECTION INTRAMUSCULAR; INTRAVENOUS; SUBCUTANEOUS at 22:59

## 2023-10-06 ASSESSMENT — PAIN SCALES - GENERAL: PAINLEVEL_OUTOF10: 8

## 2023-10-06 ASSESSMENT — PAIN - FUNCTIONAL ASSESSMENT: PAIN_FUNCTIONAL_ASSESSMENT: 0-10

## 2023-10-06 NOTE — ED PROVIDER NOTES
[place]        Pt Name: Burce Hui  MRN: [id number]  [address] [facility] 1980  Date of evaluation: 10/6/2023  Provider: Katty Condon MD  PCP: RAUL Galicia CNP  Note Started: 4:03 PM EDT 10/6/23    CHIEF COMPLAINT       Chief Complaint   Patient presents with    Abnormal Lab     Reports she received a call from her dr to come to the er for low blood count. Reports back spasms, sob, and lethargy. HISTORY OF PRESENT ILLNESS: 1 or more Elements     Bruce Hui is a 40 y.o. female gastric sleeve Aug 2022 who presents with abnormal lab. SOB and fatigue with exertion  over last week. No blood in stools but not looking closely. Endorses dark urine but no dysuria or hematuria. Endorses back spasm, chronic to her. She was told to come to ED due to low blood count. Endorses Nausea but no vomiting. Denies any fever, chills, headache, dizziness, vision changes, neck tenderness or stiffness, weakness, cp, palpitations, leg swelling/tenderness,abd pain, dysuria, hematuria, diarrhea, constipation, bloody stools. No blood thinners. Nursing Notes were all reviewed and agreed with or any disagreements were addressed in the HPI.    ROS:   Pertinent positives and negatives are stated within HPI, all other systems reviewed and are negative.      --------------------------------------------- PAST HISTORY ---------------------------------------------  Past Medical History:  has a past medical history of Asthma due to environmental allergies, Hypertension, Morbid (severe) obesity, Morbid obesity due to excess calories ([address]), Multiple sclerosis ([address]), and Numbness and tingling of right lower extremity. Past Surgical History:  has a past surgical history that includes Tubal ligation; Tympanostomy tube placement; Cholecystectomy; Endoscopy, colon, diagnostic; Upper gastrointestinal endoscopy (N/A, 1/26/2022); and Sleeve Gastrectomy (N/A, 8/1/2022).     Social

## 2023-10-07 LAB
ABO/RH: NORMAL
ALBUMIN SERPL-MCNC: 2.2 G/DL (ref 3.5–5.2)
ALP SERPL-CCNC: 48 U/L (ref 35–104)
ALT SERPL-CCNC: 7 U/L (ref 0–32)
ANION GAP SERPL CALCULATED.3IONS-SCNC: 11 MMOL/L (ref 7–16)
ANTIBODY SCREEN: NEGATIVE
ARM BAND NUMBER: NORMAL
AST SERPL-CCNC: 15 U/L (ref 0–31)
B PARAP IS1001 DNA NPH QL NAA+NON-PROBE: NOT DETECTED
B PERT DNA SPEC QL NAA+PROBE: NOT DETECTED
BASOPHILS # BLD: 0.05 K/UL (ref 0–0.2)
BASOPHILS NFR BLD: 0 % (ref 0–2)
BILIRUB SERPL-MCNC: 0.2 MG/DL (ref 0–1.2)
BLOOD BANK BLOOD PRODUCT EXPIRATION DATE: NORMAL
BLOOD BANK DISPENSE STATUS: NORMAL
BLOOD BANK ISBT PRODUCT BLOOD TYPE: 5100
BLOOD BANK PRODUCT CODE: NORMAL
BLOOD BANK SAMPLE EXPIRATION: NORMAL
BLOOD BANK UNIT TYPE AND RH: NORMAL
BPU ID: NORMAL
BUN SERPL-MCNC: 6 MG/DL (ref 6–20)
C PNEUM DNA NPH QL NAA+NON-PROBE: NOT DETECTED
CALCIUM SERPL-MCNC: 7 MG/DL (ref 8.6–10.2)
CHLORIDE SERPL-SCNC: 108 MMOL/L (ref 98–107)
CO2 SERPL-SCNC: 18 MMOL/L (ref 22–29)
COMPONENT: NORMAL
CREAT SERPL-MCNC: 0.4 MG/DL (ref 0.5–1)
CROSSMATCH RESULT: NORMAL
EOSINOPHIL # BLD: 3.07 K/UL (ref 0.05–0.5)
EOSINOPHILS RELATIVE PERCENT: 23 % (ref 0–6)
ERYTHROCYTE [DISTWIDTH] IN BLOOD BY AUTOMATED COUNT: 19.9 % (ref 11.5–15)
FERRITIN SERPL-MCNC: 14 NG/ML
FLUAV RNA NPH QL NAA+NON-PROBE: NOT DETECTED
FLUBV RNA NPH QL NAA+NON-PROBE: NOT DETECTED
GFR SERPL CREATININE-BSD FRML MDRD: >60 ML/MIN/1.73M2
GLUCOSE SERPL-MCNC: 66 MG/DL (ref 74–99)
HADV DNA NPH QL NAA+NON-PROBE: NOT DETECTED
HCOV 229E RNA NPH QL NAA+NON-PROBE: NOT DETECTED
HCOV HKU1 RNA NPH QL NAA+NON-PROBE: NOT DETECTED
HCOV NL63 RNA NPH QL NAA+NON-PROBE: NOT DETECTED
HCOV OC43 RNA NPH QL NAA+NON-PROBE: NOT DETECTED
HCT VFR BLD AUTO: 23.9 % (ref 34–48)
HCT VFR BLD AUTO: 28.1 % (ref 34–48)
HGB BLD-MCNC: 6.8 G/DL (ref 11.5–15.5)
HGB BLD-MCNC: 7.7 G/DL (ref 11.5–15.5)
HMPV RNA NPH QL NAA+NON-PROBE: NOT DETECTED
HPIV1 RNA NPH QL NAA+NON-PROBE: NOT DETECTED
HPIV2 RNA NPH QL NAA+NON-PROBE: NOT DETECTED
HPIV3 RNA NPH QL NAA+NON-PROBE: NOT DETECTED
HPIV4 RNA NPH QL NAA+NON-PROBE: NOT DETECTED
IMM GRANULOCYTES # BLD AUTO: 0.03 K/UL (ref 0–0.58)
IMM GRANULOCYTES NFR BLD: 0 % (ref 0–5)
IRON SATN MFR SERPL: 8 % (ref 15–50)
IRON SERPL-MCNC: 20 UG/DL (ref 37–145)
L PNEUMO1 AG UR QL IA.RAPID: NEGATIVE
LYMPHOCYTES NFR BLD: 2.45 K/UL (ref 1.5–4)
LYMPHOCYTES RELATIVE PERCENT: 18 % (ref 20–42)
M PNEUMO DNA NPH QL NAA+NON-PROBE: NOT DETECTED
MCH RBC QN AUTO: 19.2 PG (ref 26–35)
MCHC RBC AUTO-ENTMCNC: 28.5 G/DL (ref 32–34.5)
MCV RBC AUTO: 67.5 FL (ref 80–99.9)
MICROORGANISM/AGENT SPEC: ABNORMAL
MONOCYTES NFR BLD: 0.8 K/UL (ref 0.1–0.95)
MONOCYTES NFR BLD: 6 % (ref 2–12)
NEUTROPHILS NFR BLD: 53 % (ref 43–80)
NEUTS SEG NFR BLD: 7.16 K/UL (ref 1.8–7.3)
PLATELET # BLD AUTO: 459 K/UL (ref 130–450)
PMV BLD AUTO: 9.7 FL (ref 7–12)
POTASSIUM SERPL-SCNC: 3.8 MMOL/L (ref 3.5–5)
PROT SERPL-MCNC: 5.4 G/DL (ref 6.4–8.3)
RBC # BLD AUTO: 3.54 M/UL (ref 3.5–5.5)
RBC # BLD: ABNORMAL 10*6/UL
RSV RNA NPH QL NAA+NON-PROBE: NOT DETECTED
RV+EV RNA NPH QL NAA+NON-PROBE: NOT DETECTED
S PNEUM AG SPEC QL: NEGATIVE
SARS-COV-2 RNA NPH QL NAA+NON-PROBE: NOT DETECTED
SODIUM SERPL-SCNC: 137 MMOL/L (ref 132–146)
SPECIMEN DESCRIPTION: ABNORMAL
SPECIMEN DESCRIPTION: NORMAL
SPECIMEN SOURCE: NORMAL
TIBC SERPL-MCNC: 251 UG/DL (ref 250–450)
TRANSFUSION STATUS: NORMAL
UNIT DIVISION: 0
UNIT ISSUE DATE/TIME: NORMAL
WBC OTHER # BLD: 13.6 K/UL (ref 4.5–11.5)

## 2023-10-07 PROCEDURE — 2580000003 HC RX 258: Performed by: FAMILY MEDICINE

## 2023-10-07 PROCEDURE — 6370000000 HC RX 637 (ALT 250 FOR IP): Performed by: FAMILY MEDICINE

## 2023-10-07 PROCEDURE — 80053 COMPREHEN METABOLIC PANEL: CPT

## 2023-10-07 PROCEDURE — 85025 COMPLETE CBC W/AUTO DIFF WBC: CPT

## 2023-10-07 PROCEDURE — 2140000000 HC CCU INTERMEDIATE R&B

## 2023-10-07 PROCEDURE — 83550 IRON BINDING TEST: CPT

## 2023-10-07 PROCEDURE — 36415 COLL VENOUS BLD VENIPUNCTURE: CPT

## 2023-10-07 PROCEDURE — 82728 ASSAY OF FERRITIN: CPT

## 2023-10-07 PROCEDURE — 0202U NFCT DS 22 TRGT SARS-COV-2: CPT

## 2023-10-07 PROCEDURE — 83540 ASSAY OF IRON: CPT

## 2023-10-07 PROCEDURE — 2500000003 HC RX 250 WO HCPCS: Performed by: FAMILY MEDICINE

## 2023-10-07 PROCEDURE — 85014 HEMATOCRIT: CPT

## 2023-10-07 PROCEDURE — 85018 HEMOGLOBIN: CPT

## 2023-10-07 PROCEDURE — 6360000002 HC RX W HCPCS: Performed by: INTERNAL MEDICINE

## 2023-10-07 PROCEDURE — 6360000002 HC RX W HCPCS: Performed by: FAMILY MEDICINE

## 2023-10-07 PROCEDURE — 2580000003 HC RX 258: Performed by: INTERNAL MEDICINE

## 2023-10-07 RX ORDER — SODIUM CHLORIDE 0.9 % (FLUSH) 0.9 %
10 SYRINGE (ML) INJECTION EVERY 12 HOURS SCHEDULED
Status: DISCONTINUED | OUTPATIENT
Start: 2023-10-07 | End: 2023-10-09 | Stop reason: HOSPADM

## 2023-10-07 RX ORDER — HYDROXYZINE PAMOATE 25 MG/1
25 CAPSULE ORAL NIGHTLY
Status: DISCONTINUED | OUTPATIENT
Start: 2023-10-07 | End: 2023-10-09 | Stop reason: HOSPADM

## 2023-10-07 RX ORDER — ACETAMINOPHEN 650 MG/1
650 SUPPOSITORY RECTAL EVERY 6 HOURS PRN
Status: DISCONTINUED | OUTPATIENT
Start: 2023-10-07 | End: 2023-10-09 | Stop reason: HOSPADM

## 2023-10-07 RX ORDER — PROMETHAZINE HYDROCHLORIDE 12.5 MG/1
12.5 TABLET ORAL EVERY 6 HOURS PRN
Status: DISCONTINUED | OUTPATIENT
Start: 2023-10-07 | End: 2023-10-09 | Stop reason: HOSPADM

## 2023-10-07 RX ORDER — ACETAMINOPHEN 325 MG/1
650 TABLET ORAL EVERY 6 HOURS PRN
Status: DISCONTINUED | OUTPATIENT
Start: 2023-10-07 | End: 2023-10-09 | Stop reason: HOSPADM

## 2023-10-07 RX ORDER — FERROUS SULFATE 325(65) MG
325 TABLET ORAL
COMMUNITY

## 2023-10-07 RX ORDER — AMLODIPINE BESYLATE 5 MG/1
5 TABLET ORAL DAILY
Status: DISCONTINUED | OUTPATIENT
Start: 2023-10-07 | End: 2023-10-09 | Stop reason: HOSPADM

## 2023-10-07 RX ORDER — ONDANSETRON 2 MG/ML
4 INJECTION INTRAMUSCULAR; INTRAVENOUS EVERY 6 HOURS PRN
Status: DISCONTINUED | OUTPATIENT
Start: 2023-10-07 | End: 2023-10-09 | Stop reason: HOSPADM

## 2023-10-07 RX ORDER — POLYETHYLENE GLYCOL 3350 17 G/17G
17 POWDER, FOR SOLUTION ORAL DAILY PRN
Status: DISCONTINUED | OUTPATIENT
Start: 2023-10-07 | End: 2023-10-09 | Stop reason: HOSPADM

## 2023-10-07 RX ORDER — SODIUM CHLORIDE 9 MG/ML
INJECTION, SOLUTION INTRAVENOUS PRN
Status: DISCONTINUED | OUTPATIENT
Start: 2023-10-07 | End: 2023-10-09 | Stop reason: HOSPADM

## 2023-10-07 RX ORDER — TRAZODONE HYDROCHLORIDE 50 MG/1
150 TABLET ORAL NIGHTLY
Status: DISCONTINUED | OUTPATIENT
Start: 2023-10-07 | End: 2023-10-09 | Stop reason: HOSPADM

## 2023-10-07 RX ORDER — PANTOPRAZOLE SODIUM 40 MG/1
40 TABLET, DELAYED RELEASE ORAL EVERY MORNING
Status: DISCONTINUED | OUTPATIENT
Start: 2023-10-07 | End: 2023-10-09 | Stop reason: HOSPADM

## 2023-10-07 RX ORDER — SODIUM CHLORIDE 0.9 % (FLUSH) 0.9 %
10 SYRINGE (ML) INJECTION PRN
Status: DISCONTINUED | OUTPATIENT
Start: 2023-10-07 | End: 2023-10-09 | Stop reason: HOSPADM

## 2023-10-07 RX ADMIN — SODIUM CHLORIDE, PRESERVATIVE FREE 10 ML: 5 INJECTION INTRAVENOUS at 12:08

## 2023-10-07 RX ADMIN — SODIUM CHLORIDE 125 MG: 9 INJECTION, SOLUTION INTRAVENOUS at 12:48

## 2023-10-07 RX ADMIN — HYDROXYZINE PAMOATE 25 MG: 25 CAPSULE ORAL at 21:04

## 2023-10-07 RX ADMIN — WATER 1000 MG: 1 INJECTION INTRAMUSCULAR; INTRAVENOUS; SUBCUTANEOUS at 21:04

## 2023-10-07 RX ADMIN — SODIUM CHLORIDE, PRESERVATIVE FREE 10 ML: 5 INJECTION INTRAVENOUS at 21:05

## 2023-10-07 RX ADMIN — ACETAMINOPHEN 650 MG: 325 TABLET ORAL at 21:04

## 2023-10-07 RX ADMIN — TRAZODONE HYDROCHLORIDE 150 MG: 50 TABLET ORAL at 21:04

## 2023-10-07 RX ADMIN — DOXYCYCLINE 100 MG: 100 INJECTION, POWDER, LYOPHILIZED, FOR SOLUTION INTRAVENOUS at 21:16

## 2023-10-07 RX ADMIN — PANTOPRAZOLE SODIUM 40 MG: 40 TABLET, DELAYED RELEASE ORAL at 12:08

## 2023-10-07 RX ADMIN — DOXYCYCLINE 100 MG: 100 INJECTION, POWDER, LYOPHILIZED, FOR SOLUTION INTRAVENOUS at 12:13

## 2023-10-07 RX ADMIN — AMLODIPINE BESYLATE 5 MG: 5 TABLET ORAL at 12:08

## 2023-10-07 ASSESSMENT — PAIN DESCRIPTION - LOCATION
LOCATION: BACK
LOCATION: CHEST

## 2023-10-07 ASSESSMENT — PAIN DESCRIPTION - PAIN TYPE: TYPE: ACUTE PAIN

## 2023-10-07 ASSESSMENT — PAIN SCALES - GENERAL
PAINLEVEL_OUTOF10: 3
PAINLEVEL_OUTOF10: 8
PAINLEVEL_OUTOF10: 2

## 2023-10-07 ASSESSMENT — PAIN DESCRIPTION - DESCRIPTORS: DESCRIPTORS: SQUEEZING;DISCOMFORT

## 2023-10-07 ASSESSMENT — PAIN - FUNCTIONAL ASSESSMENT
PAIN_FUNCTIONAL_ASSESSMENT: 0-10
PAIN_FUNCTIONAL_ASSESSMENT: ACTIVITIES ARE NOT PREVENTED

## 2023-10-07 ASSESSMENT — PAIN DESCRIPTION - ORIENTATION: ORIENTATION: MID

## 2023-10-07 NOTE — H&P
medicine  -Telemetry  -Monitor hemoglobin levels  -Transfuse for hemoglobin less than 7.0  -Ceftriaxone and doxycycline  -Respiratory cultures  -Telemetry  -Iron studies  -Continue home medications        Diet: No diet orders on file  Code Status: Prior  Surrogate decision maker confirmed with patient:   Extended Emergency Contact Information  Primary Emergency Contact: Magdalena Del Rosario  Address: [address] [facility], [address] [facility] [address] [place] Phone: [phone]  Mobile Phone: [phone]  Relation: Spouse   needed? No  Secondary Emergency Contact: Liz Santo, [address] [place] [address] Phone: [phone]  Relation: Parent    DVT Prophylaxis: []Lovenox []Heparin []PCD [] [address] []Encouraged ambulation  Disposition: []Med/Surg [] Intermediate [] ICU/CCU  Admit status: [] Observation [] Inpatient     +++++++++++++++++++++++++++++++++++++++++++++++++  Lyndsey Escobedo DO  +++++++++++++++++++++++++++++++++++++++++++++++++  NOTE: This report was transcribed using voice recognition software. Every effort was made to ensure accuracy; however, inadvertent computerized transcription errors may be present.

## 2023-10-07 NOTE — ED NOTES
Dr. Jahaira Wood made aware of critical lab value via perfect serve at [address] [place], [place]  10/07/23 [id number]

## 2023-10-08 LAB
ERYTHROCYTE [DISTWIDTH] IN BLOOD BY AUTOMATED COUNT: 19.8 % (ref 11.5–15)
HCT VFR BLD AUTO: 24.7 % (ref 34–48)
HGB BLD-MCNC: 7 G/DL (ref 11.5–15.5)
MCH RBC QN AUTO: 19.3 PG (ref 26–35)
MCHC RBC AUTO-ENTMCNC: 28.3 G/DL (ref 32–34.5)
MCV RBC AUTO: 68.2 FL (ref 80–99.9)
PLATELET # BLD AUTO: 463 K/UL (ref 130–450)
PMV BLD AUTO: 9.8 FL (ref 7–12)
RBC # BLD AUTO: 3.62 M/UL (ref 3.5–5.5)
WBC OTHER # BLD: 12.3 K/UL (ref 4.5–11.5)

## 2023-10-08 PROCEDURE — 36415 COLL VENOUS BLD VENIPUNCTURE: CPT

## 2023-10-08 PROCEDURE — 2580000003 HC RX 258: Performed by: FAMILY MEDICINE

## 2023-10-08 PROCEDURE — 2140000000 HC CCU INTERMEDIATE R&B

## 2023-10-08 PROCEDURE — 6360000002 HC RX W HCPCS: Performed by: FAMILY MEDICINE

## 2023-10-08 PROCEDURE — 2500000003 HC RX 250 WO HCPCS: Performed by: FAMILY MEDICINE

## 2023-10-08 PROCEDURE — 2580000003 HC RX 258: Performed by: INTERNAL MEDICINE

## 2023-10-08 PROCEDURE — 6370000000 HC RX 637 (ALT 250 FOR IP): Performed by: FAMILY MEDICINE

## 2023-10-08 PROCEDURE — 6360000002 HC RX W HCPCS: Performed by: INTERNAL MEDICINE

## 2023-10-08 PROCEDURE — 85027 COMPLETE CBC AUTOMATED: CPT

## 2023-10-08 RX ADMIN — DOXYCYCLINE 100 MG: 100 INJECTION, POWDER, LYOPHILIZED, FOR SOLUTION INTRAVENOUS at 09:35

## 2023-10-08 RX ADMIN — SODIUM CHLORIDE, PRESERVATIVE FREE 10 ML: 5 INJECTION INTRAVENOUS at 09:27

## 2023-10-08 RX ADMIN — SODIUM CHLORIDE 125 MG: 9 INJECTION, SOLUTION INTRAVENOUS at 10:40

## 2023-10-08 RX ADMIN — DOXYCYCLINE 100 MG: 100 INJECTION, POWDER, LYOPHILIZED, FOR SOLUTION INTRAVENOUS at 22:22

## 2023-10-08 RX ADMIN — PANTOPRAZOLE SODIUM 40 MG: 40 TABLET, DELAYED RELEASE ORAL at 09:27

## 2023-10-08 RX ADMIN — HYDROXYZINE PAMOATE 25 MG: 25 CAPSULE ORAL at 22:12

## 2023-10-08 RX ADMIN — AMLODIPINE BESYLATE 5 MG: 5 TABLET ORAL at 09:27

## 2023-10-08 RX ADMIN — WATER 1000 MG: 1 INJECTION INTRAMUSCULAR; INTRAVENOUS; SUBCUTANEOUS at 22:13

## 2023-10-08 RX ADMIN — TRAZODONE HYDROCHLORIDE 150 MG: 50 TABLET ORAL at 22:12

## 2023-10-08 RX ADMIN — SODIUM CHLORIDE, PRESERVATIVE FREE 10 ML: 5 INJECTION INTRAVENOUS at 22:13

## 2023-10-09 VITALS
HEIGHT: 68 IN | WEIGHT: 157 LBS | OXYGEN SATURATION: 100 % | SYSTOLIC BLOOD PRESSURE: 132 MMHG | TEMPERATURE: 97.8 F | BODY MASS INDEX: 23.79 KG/M2 | RESPIRATION RATE: 16 BRPM | HEART RATE: 80 BPM | DIASTOLIC BLOOD PRESSURE: 90 MMHG

## 2023-10-09 PROBLEM — D50.9 IRON DEFICIENCY ANEMIA: Status: ACTIVE | Noted: 2023-10-09

## 2023-10-09 PROBLEM — D64.9 ACUTE ON CHRONIC ANEMIA: Status: ACTIVE | Noted: 2023-10-09

## 2023-10-09 LAB
ERYTHROCYTE [DISTWIDTH] IN BLOOD BY AUTOMATED COUNT: 20.6 % (ref 11.5–15)
HCT VFR BLD AUTO: 26.1 % (ref 34–48)
HGB BLD-MCNC: 7.3 G/DL (ref 11.5–15.5)
MCH RBC QN AUTO: 19.5 PG (ref 26–35)
MCHC RBC AUTO-ENTMCNC: 28 G/DL (ref 32–34.5)
MCV RBC AUTO: 69.6 FL (ref 80–99.9)
PLATELET # BLD AUTO: 484 K/UL (ref 130–450)
PMV BLD AUTO: 9.7 FL (ref 7–12)
RBC # BLD AUTO: 3.75 M/UL (ref 3.5–5.5)
WBC OTHER # BLD: 13.1 K/UL (ref 4.5–11.5)

## 2023-10-09 PROCEDURE — 2580000003 HC RX 258: Performed by: FAMILY MEDICINE

## 2023-10-09 PROCEDURE — 2580000003 HC RX 258: Performed by: INTERNAL MEDICINE

## 2023-10-09 PROCEDURE — 85027 COMPLETE CBC AUTOMATED: CPT

## 2023-10-09 PROCEDURE — 6360000002 HC RX W HCPCS: Performed by: INTERNAL MEDICINE

## 2023-10-09 PROCEDURE — 2500000003 HC RX 250 WO HCPCS: Performed by: FAMILY MEDICINE

## 2023-10-09 PROCEDURE — 6370000000 HC RX 637 (ALT 250 FOR IP): Performed by: FAMILY MEDICINE

## 2023-10-09 PROCEDURE — 36415 COLL VENOUS BLD VENIPUNCTURE: CPT

## 2023-10-09 RX ORDER — ONDANSETRON 2 MG/ML
8 INJECTION INTRAMUSCULAR; INTRAVENOUS
OUTPATIENT
Start: 2023-10-09

## 2023-10-09 RX ORDER — DIPHENHYDRAMINE HYDROCHLORIDE 50 MG/ML
50 INJECTION INTRAMUSCULAR; INTRAVENOUS
OUTPATIENT
Start: 2023-10-09

## 2023-10-09 RX ORDER — EPINEPHRINE 1 MG/ML
0.3 INJECTION, SOLUTION, CONCENTRATE INTRAVENOUS PRN
OUTPATIENT
Start: 2023-10-09

## 2023-10-09 RX ORDER — AMOXICILLIN AND CLAVULANATE POTASSIUM 875; 125 MG/1; MG/1
1 TABLET, FILM COATED ORAL 2 TIMES DAILY
Qty: 4 TABLET | Refills: 0 | Status: SHIPPED | OUTPATIENT
Start: 2023-10-09 | End: 2023-10-11

## 2023-10-09 RX ORDER — SODIUM CHLORIDE 0.9 % (FLUSH) 0.9 %
5-40 SYRINGE (ML) INJECTION PRN
OUTPATIENT
Start: 2023-10-09

## 2023-10-09 RX ORDER — SODIUM FERRIC GLUCONATE COMPLEX IN SUCROSE 12.5 MG/ML
125 INJECTION INTRAVENOUS DAILY
Qty: 60 ML | Refills: 0 | Status: SHIPPED | OUTPATIENT
Start: 2023-10-09 | End: 2023-10-09 | Stop reason: SDUPTHER

## 2023-10-09 RX ORDER — ACETAMINOPHEN 325 MG/1
650 TABLET ORAL
OUTPATIENT
Start: 2023-10-09

## 2023-10-09 RX ORDER — SODIUM CHLORIDE 9 MG/ML
INJECTION, SOLUTION INTRAVENOUS CONTINUOUS
OUTPATIENT
Start: 2023-10-09

## 2023-10-09 RX ORDER — ALBUTEROL SULFATE 90 UG/1
4 AEROSOL, METERED RESPIRATORY (INHALATION) PRN
OUTPATIENT
Start: 2023-10-09

## 2023-10-09 RX ORDER — SODIUM FERRIC GLUCONATE COMPLEX IN SUCROSE 12.5 MG/ML
125 INJECTION INTRAVENOUS DAILY
Qty: 50 ML | Refills: 0 | Status: SHIPPED | OUTPATIENT
Start: 2023-10-09 | End: 2023-10-14

## 2023-10-09 RX ORDER — SODIUM FERRIC GLUCONATE COMPLEX IN SUCROSE 12.5 MG/ML
125 INJECTION INTRAVENOUS DAILY
Qty: 50 ML | Refills: 0 | Status: SHIPPED | OUTPATIENT
Start: 2023-10-09 | End: 2023-10-09 | Stop reason: SDUPTHER

## 2023-10-09 RX ORDER — DOXYCYCLINE HYCLATE 100 MG
100 TABLET ORAL 2 TIMES DAILY
Qty: 4 TABLET | Refills: 0 | Status: SHIPPED | OUTPATIENT
Start: 2023-10-09 | End: 2023-10-11

## 2023-10-09 RX ADMIN — AMLODIPINE BESYLATE 5 MG: 5 TABLET ORAL at 09:59

## 2023-10-09 RX ADMIN — DOXYCYCLINE 100 MG: 100 INJECTION, POWDER, LYOPHILIZED, FOR SOLUTION INTRAVENOUS at 10:16

## 2023-10-09 RX ADMIN — SODIUM CHLORIDE 125 MG: 9 INJECTION, SOLUTION INTRAVENOUS at 12:52

## 2023-10-09 RX ADMIN — PANTOPRAZOLE SODIUM 40 MG: 40 TABLET, DELAYED RELEASE ORAL at 09:59

## 2023-10-09 RX ADMIN — SODIUM CHLORIDE, PRESERVATIVE FREE 10 ML: 5 INJECTION INTRAVENOUS at 09:59

## 2023-10-09 NOTE — CARE COORDINATION
10/9:  Update CM Note:  Pt will need to follow up with the [address] [facility] by the end of next week - they are pending auth for the IV Iron. DR & pt made aware. Pt is still ok to dc home today.   Electronically signed by Tracie Garcia RN on 10/9/2023 at 12:49 PM

## 2023-10-09 NOTE — CARE COORDINATION
10/9:  Transition of care:  Pt presented to the ER for abnormal lab level from her PCP from home. Pt is dc today. Pt's PCP is Shantal & uses [facility] in [place]. Pt lives with her  & 3 children ages from 25-25 with 4 steps to enter. The bed/bathroom are on the 1st floor. PTA pt was independent w/o any DME. Pt has no HHC/SNF hx.  Pt's dc plan is home & her  can transport. Pt will need set up for Iron Infusions with [facility] on [place]. Sw/CM will continue to follow.   Electronically signed by Pretty Sweet RN on 10/9/2023 at 12:03 PM

## 2023-10-09 NOTE — ACP (ADVANCE CARE PLANNING)
Advance Care Planning   Healthcare Decision Maker:    Primary Decision Maker: Nic Cabrera - Spouse - [phone]    Click here to complete Healthcare Decision Makers including selection of the Healthcare Decision Maker Relationship (ie \"Primary\"). Today we documented Decision Maker(s) consistent with Legal Next of Kin hierarchy. Cm doesn't have POA paper work or living will & declined [address]. Pt is  & has 3 adult children.   Electronically signed by Feliz Martin RN on 10/9/2023 at 12:50 PM

## 2023-10-09 NOTE — DISCHARGE SUMMARY
the hemidiaphragms. New opacities in the upper lobes concerning for pneumonia. CT chest may be warranted for further evaluation. Disposition:  Home     Discharge Instructions/Follow-up:    Continue IV iron infusions outpatient  Follow up with PCP    Code Status:  Full Code     Activity: activity as tolerated    Diet: regular diet    Labs:  For convenience and continuity at follow-up the following most recent labs are provided:      CBC:    Lab Results   Component Value Date/Time    WBC 13.1 10/09/2023 05:24 AM    HGB 7.3 10/09/2023 05:24 AM    HCT 26.1 10/09/2023 05:24 AM     10/09/2023 05:24 AM       Renal:    Lab Results   Component Value Date/Time     10/07/2023 04:54 AM    K 3.8 10/07/2023 04:54 AM    K 4.0 11/19/2021 11:51 AM     10/07/2023 04:54 AM    CO2 18 10/07/2023 04:54 AM    BUN 6 10/07/2023 04:54 AM    CREATININE 0.4 10/07/2023 04:54 AM    CALCIUM 7.0 10/07/2023 04:54 AM       Discharge Medications:     Current Discharge Medication List             Details   doxycycline hyclate (VIBRA-TABS) 100 MG tablet Take 1 tablet by mouth 2 times daily for 2 days  Qty: 4 tablet, Refills: 0      amoxicillin-clavulanate (AUGMENTIN) 875-125 MG per tablet Take 1 tablet by mouth 2 times daily for 2 days  Qty: 4 tablet, Refills: 0      ferric gluconate (FERRLECIT) 12.5 MG/ML injection Infuse 10 mLs intravenously daily for 5 days  Qty: 50 mL, Refills: 0    Associated Diagnoses: Iron deficiency anemia, unspecified iron deficiency anemia type                Details   ferrous sulfate (IRON 325) 325 (65 Fe) MG tablet Take 1 tablet by mouth daily (with breakfast)      ibuprofen (ADVIL;MOTRIN) 800 MG tablet Take 1 tablet by mouth every 6 hours as needed for Pain  Qty: 21 tablet, Refills: 0      omeprazole (PRILOSEC) 20 MG delayed release capsule TAKE 1 CAPSULE BY MOUTH EVERY MORNING  Qty: 30 capsule, Refills: 12    Associated Diagnoses: Gastroesophageal reflux disease without esophagitis

## 2023-10-16 ENCOUNTER — HOSPITAL ENCOUNTER (OUTPATIENT)
Dept: INFUSION THERAPY | Age: 43
Setting detail: INFUSION SERIES
Discharge: HOME | End: 2023-10-16
Payer: MEDICARE

## 2023-10-16 VITALS
SYSTOLIC BLOOD PRESSURE: 105 MMHG | HEART RATE: 67 BPM | OXYGEN SATURATION: 100 % | TEMPERATURE: 97.8 F | RESPIRATION RATE: 16 BRPM | DIASTOLIC BLOOD PRESSURE: 64 MMHG

## 2023-10-16 DIAGNOSIS — D50.9 IRON DEFICIENCY ANEMIA, UNSPECIFIED IRON DEFICIENCY ANEMIA TYPE: Primary | ICD-10-CM

## 2023-10-16 PROCEDURE — 96365 THER/PROPH/DIAG IV INF INIT: CPT

## 2023-10-16 PROCEDURE — 2580000003 HC RX 258: Performed by: INTERNAL MEDICINE

## 2023-10-16 PROCEDURE — 6360000002 HC RX W HCPCS: Performed by: INTERNAL MEDICINE

## 2023-10-16 RX ORDER — SODIUM CHLORIDE 0.9 % (FLUSH) 0.9 %
5-40 SYRINGE (ML) INJECTION PRN
Status: DISCONTINUED | OUTPATIENT
Start: 2023-10-16 | End: 2023-10-17 | Stop reason: HOSPADM

## 2023-10-16 RX ORDER — ONDANSETRON 2 MG/ML
8 INJECTION INTRAMUSCULAR; INTRAVENOUS
Status: CANCELLED | OUTPATIENT
Start: 2023-10-18

## 2023-10-16 RX ORDER — EPINEPHRINE 1 MG/ML
0.3 INJECTION, SOLUTION, CONCENTRATE INTRAVENOUS PRN
Status: CANCELLED | OUTPATIENT
Start: 2023-10-18

## 2023-10-16 RX ORDER — ALBUTEROL SULFATE 90 UG/1
4 AEROSOL, METERED RESPIRATORY (INHALATION) PRN
Status: CANCELLED | OUTPATIENT
Start: 2023-10-18

## 2023-10-16 RX ORDER — ACETAMINOPHEN 325 MG/1
650 TABLET ORAL
Status: CANCELLED | OUTPATIENT
Start: 2023-10-18

## 2023-10-16 RX ORDER — SODIUM CHLORIDE 9 MG/ML
INJECTION, SOLUTION INTRAVENOUS CONTINUOUS
Status: CANCELLED | OUTPATIENT
Start: 2023-10-18

## 2023-10-16 RX ORDER — SODIUM CHLORIDE 9 MG/ML
INJECTION, SOLUTION INTRAVENOUS CONTINUOUS
Status: DISCONTINUED | OUTPATIENT
Start: 2023-10-16 | End: 2023-10-17 | Stop reason: HOSPADM

## 2023-10-16 RX ORDER — DIPHENHYDRAMINE HYDROCHLORIDE 50 MG/ML
50 INJECTION INTRAMUSCULAR; INTRAVENOUS
Status: CANCELLED | OUTPATIENT
Start: 2023-10-18

## 2023-10-16 RX ORDER — SODIUM CHLORIDE 0.9 % (FLUSH) 0.9 %
5-40 SYRINGE (ML) INJECTION PRN
Status: CANCELLED | OUTPATIENT
Start: 2023-10-18

## 2023-10-16 RX ADMIN — SODIUM CHLORIDE 125 MG: 9 INJECTION, SOLUTION INTRAVENOUS at 12:17

## 2023-10-16 RX ADMIN — SODIUM CHLORIDE: 9 INJECTION, SOLUTION INTRAVENOUS at 13:22

## 2023-10-16 RX ADMIN — SODIUM CHLORIDE, PRESERVATIVE FREE 10 ML: 5 INJECTION INTRAVENOUS at 12:14

## 2023-10-16 NOTE — DISCHARGE INSTRUCTIONS
redness, or tingly feeling);  fast or uneven heart rate; or  dangerously high blood pressure (severe headache, blurred vision, buzzing in your ears, anxiety, confusion, uneven heartbeats, seizure). Less serious side effects may include:  pain, leg cramps;  dizziness, general ill feeling;  mild headache;  nausea, vomiting, diarrhea; or  pain, redness, swelling, or irritation around the IV needle. This is not a complete list of side effects and others may occur. Call your doctor for medical advice about side effects. You may report side effects to FDA at 1-386-QWJ-3981. What other drugs will affect sodium ferric gluconate complex? Tell your doctor about all other medicines you use, especially iron supplements you take by mouth. There may be other drugs that can interact with sodium ferric gluconate complex. Tell your doctor about all medications you use. This includes prescription, over-the-counter, vitamin, and herbal products. Do not start a new medication without telling your doctor. Where can I get more information? Your pharmacist can provide more information about sodium ferric gluconate complex. Remember, keep this and all other medicines out of the reach of children, never share your medicines with others, and use this medication only for the indication prescribed. Every effort has been made to ensure that the information provided by [address] [place] [address] is accurate, up-to-date, and complete, but no guarantee is made to that effect. Drug information contained herein may be time sensitive. [facility] information has been compiled for use by healthcare practitioners and consumers in the [facility] and therefore [facility] does not warrant that uses outside of the [facility] are appropriate, unless specifically indicated otherwise. [employer] drug information does not endorse drugs, diagnose patients or recommend therapy.  [employer] drug information is an informational resource designed to assist licensed healthcare practitioners in caring for their patients and/or to serve consumers viewing this service as a supplement to, and not a substitute for, the expertise, skill, knowledge and judgment of healthcare practitioners. The absence of a warning for a given drug or drug combination in no way should be construed to indicate that the drug or drug combination is safe, effective or appropriate for any given patient. [facility] does not assume any responsibility for any aspect of healthcare administered with the aid of information [facility] provides. The information contained herein is not intended to cover all possible uses, directions, precautions, warnings, drug interactions, allergic reactions, or adverse effects. If you have questions about the drugs you are taking, check with your doctor, nurse or pharmacist.  Copyright 2322-0991 [employer]. Version: 1.01. Revision date: 8/15/2012. This information does not replace the advice of a doctor. [employer] disclaims any warranty or liability for your use of this information.    Content Version: 18.1.698519

## 2023-10-16 NOTE — FLOWSHEET NOTE
Patient tolerated infusion well. Remained on unit for 40 minutes after treatment. Patient alert and oriented x3. No distress noted. Vital signs stable. Patient denies any new or worsening pain. Educated patient on possible side effects and treatment of medication. Patient verbalized understanding. Offered patient education and/or discharge material. Patient received. Patient denies any needs. All questions answered. D/C in stable condition.

## 2023-10-17 ENCOUNTER — HOSPITAL ENCOUNTER (OUTPATIENT)
Dept: INFUSION THERAPY | Age: 43
Setting detail: INFUSION SERIES
Discharge: HOME | End: 2023-10-17
Payer: MEDICARE

## 2023-10-17 VITALS
TEMPERATURE: 97.6 F | DIASTOLIC BLOOD PRESSURE: 73 MMHG | OXYGEN SATURATION: 98 % | RESPIRATION RATE: 16 BRPM | SYSTOLIC BLOOD PRESSURE: 120 MMHG | HEART RATE: 70 BPM

## 2023-10-17 DIAGNOSIS — D50.9 IRON DEFICIENCY ANEMIA, UNSPECIFIED IRON DEFICIENCY ANEMIA TYPE: Primary | ICD-10-CM

## 2023-10-17 PROCEDURE — 2580000003 HC RX 258: Performed by: INTERNAL MEDICINE

## 2023-10-17 PROCEDURE — 6360000002 HC RX W HCPCS: Performed by: INTERNAL MEDICINE

## 2023-10-17 PROCEDURE — 96365 THER/PROPH/DIAG IV INF INIT: CPT

## 2023-10-17 RX ORDER — SODIUM CHLORIDE 0.9 % (FLUSH) 0.9 %
5-40 SYRINGE (ML) INJECTION PRN
Status: CANCELLED | OUTPATIENT
Start: 2023-10-18

## 2023-10-17 RX ORDER — ACETAMINOPHEN 325 MG/1
650 TABLET ORAL
Status: CANCELLED | OUTPATIENT
Start: 2023-10-18

## 2023-10-17 RX ORDER — DIPHENHYDRAMINE HYDROCHLORIDE 50 MG/ML
50 INJECTION INTRAMUSCULAR; INTRAVENOUS
Status: CANCELLED | OUTPATIENT
Start: 2023-10-18

## 2023-10-17 RX ORDER — SODIUM CHLORIDE 9 MG/ML
INJECTION, SOLUTION INTRAVENOUS CONTINUOUS
Status: CANCELLED | OUTPATIENT
Start: 2023-10-18

## 2023-10-17 RX ORDER — SODIUM CHLORIDE 0.9 % (FLUSH) 0.9 %
5-40 SYRINGE (ML) INJECTION PRN
Status: DISCONTINUED | OUTPATIENT
Start: 2023-10-17 | End: 2023-10-18 | Stop reason: HOSPADM

## 2023-10-17 RX ORDER — ALBUTEROL SULFATE 90 UG/1
4 AEROSOL, METERED RESPIRATORY (INHALATION) PRN
Status: CANCELLED | OUTPATIENT
Start: 2023-10-18

## 2023-10-17 RX ORDER — SODIUM CHLORIDE 9 MG/ML
INJECTION, SOLUTION INTRAVENOUS CONTINUOUS
Status: DISCONTINUED | OUTPATIENT
Start: 2023-10-17 | End: 2023-10-18 | Stop reason: HOSPADM

## 2023-10-17 RX ORDER — EPINEPHRINE 1 MG/ML
0.3 INJECTION, SOLUTION, CONCENTRATE INTRAVENOUS PRN
Status: CANCELLED | OUTPATIENT
Start: 2023-10-18

## 2023-10-17 RX ORDER — ONDANSETRON 2 MG/ML
8 INJECTION INTRAMUSCULAR; INTRAVENOUS
Status: CANCELLED | OUTPATIENT
Start: 2023-10-18

## 2023-10-17 RX ADMIN — SODIUM CHLORIDE 125 MG: 9 INJECTION, SOLUTION INTRAVENOUS at 11:44

## 2023-10-17 RX ADMIN — SODIUM CHLORIDE, PRESERVATIVE FREE 10 ML: 5 INJECTION INTRAVENOUS at 11:44

## 2023-10-17 RX ADMIN — SODIUM CHLORIDE, PRESERVATIVE FREE 10 ML: 5 INJECTION INTRAVENOUS at 12:37

## 2023-10-17 NOTE — FLOWSHEET NOTE
Patient tolerated infusion well. Remained on unit for 15 minutes after treatment. Patient alert and oriented x3. No distress noted. Vital signs stable. Patient denies any new or worsening pain. Educated patient on possible side effects and treatment of medication. Patient verbalized understanding. Offered patient education and/or discharge material. Patient declined. Patient denies any needs. All questions answered. D/C in stable condition.

## 2023-10-18 ENCOUNTER — HOSPITAL ENCOUNTER (OUTPATIENT)
Dept: INFUSION THERAPY | Age: 43
Setting detail: INFUSION SERIES
Discharge: HOME | End: 2023-10-18
Payer: MEDICARE

## 2023-10-18 VITALS
HEART RATE: 74 BPM | SYSTOLIC BLOOD PRESSURE: 126 MMHG | RESPIRATION RATE: 16 BRPM | OXYGEN SATURATION: 98 % | DIASTOLIC BLOOD PRESSURE: 87 MMHG | TEMPERATURE: 97.7 F

## 2023-10-18 DIAGNOSIS — D50.9 IRON DEFICIENCY ANEMIA, UNSPECIFIED IRON DEFICIENCY ANEMIA TYPE: Primary | ICD-10-CM

## 2023-10-18 PROCEDURE — 6360000002 HC RX W HCPCS: Performed by: INTERNAL MEDICINE

## 2023-10-18 PROCEDURE — 2580000003 HC RX 258: Performed by: INTERNAL MEDICINE

## 2023-10-18 PROCEDURE — 96365 THER/PROPH/DIAG IV INF INIT: CPT

## 2023-10-18 RX ORDER — SODIUM CHLORIDE 9 MG/ML
INJECTION, SOLUTION INTRAVENOUS CONTINUOUS
Status: CANCELLED | OUTPATIENT
Start: 2023-10-19

## 2023-10-18 RX ORDER — DIPHENHYDRAMINE HYDROCHLORIDE 50 MG/ML
50 INJECTION INTRAMUSCULAR; INTRAVENOUS
Status: CANCELLED | OUTPATIENT
Start: 2023-10-19

## 2023-10-18 RX ORDER — ACETAMINOPHEN 325 MG/1
650 TABLET ORAL
Status: CANCELLED | OUTPATIENT
Start: 2023-10-19

## 2023-10-18 RX ORDER — ALBUTEROL SULFATE 90 UG/1
4 AEROSOL, METERED RESPIRATORY (INHALATION) PRN
Status: CANCELLED | OUTPATIENT
Start: 2023-10-19

## 2023-10-18 RX ORDER — SODIUM CHLORIDE 0.9 % (FLUSH) 0.9 %
5-40 SYRINGE (ML) INJECTION PRN
Status: CANCELLED | OUTPATIENT
Start: 2023-10-19

## 2023-10-18 RX ORDER — EPINEPHRINE 1 MG/ML
0.3 INJECTION, SOLUTION, CONCENTRATE INTRAVENOUS PRN
Status: CANCELLED | OUTPATIENT
Start: 2023-10-19

## 2023-10-18 RX ORDER — SODIUM CHLORIDE 0.9 % (FLUSH) 0.9 %
5-40 SYRINGE (ML) INJECTION PRN
Status: DISCONTINUED | OUTPATIENT
Start: 2023-10-18 | End: 2023-10-19 | Stop reason: HOSPADM

## 2023-10-18 RX ORDER — ONDANSETRON 2 MG/ML
8 INJECTION INTRAMUSCULAR; INTRAVENOUS
Status: CANCELLED | OUTPATIENT
Start: 2023-10-19

## 2023-10-18 RX ADMIN — SODIUM CHLORIDE 125 MG: 9 INJECTION, SOLUTION INTRAVENOUS at 11:48

## 2023-10-18 RX ADMIN — SODIUM CHLORIDE, PRESERVATIVE FREE 10 ML: 5 INJECTION INTRAVENOUS at 11:45

## 2023-10-18 NOTE — FLOWSHEET NOTE
Patient tolerated infusion well. Remained on unit for 20 minutes after treatment. Patient alert and oriented x3. No distress noted. Vital signs stable. Patient denies any new or worsening pain. Educated patient on possible side effects and treatment of medication. Patient verbalized understanding. Offered patient education and/or discharge material. Patient  declined. Patient denies any needs. All questions answered. D/C in stable condition.

## 2023-10-19 ENCOUNTER — HOSPITAL ENCOUNTER (OUTPATIENT)
Dept: INFUSION THERAPY | Age: 43
Setting detail: INFUSION SERIES
Discharge: HOME | End: 2023-10-19
Payer: MEDICARE

## 2023-10-19 VITALS
RESPIRATION RATE: 16 BRPM | HEART RATE: 73 BPM | OXYGEN SATURATION: 100 % | TEMPERATURE: 96.4 F | DIASTOLIC BLOOD PRESSURE: 69 MMHG | SYSTOLIC BLOOD PRESSURE: 114 MMHG

## 2023-10-19 DIAGNOSIS — D50.9 IRON DEFICIENCY ANEMIA, UNSPECIFIED IRON DEFICIENCY ANEMIA TYPE: Primary | ICD-10-CM

## 2023-10-19 PROCEDURE — 2580000003 HC RX 258: Performed by: INTERNAL MEDICINE

## 2023-10-19 PROCEDURE — 6360000002 HC RX W HCPCS: Performed by: INTERNAL MEDICINE

## 2023-10-19 PROCEDURE — 96365 THER/PROPH/DIAG IV INF INIT: CPT

## 2023-10-19 RX ORDER — ALBUTEROL SULFATE 90 UG/1
4 AEROSOL, METERED RESPIRATORY (INHALATION) PRN
Status: CANCELLED | OUTPATIENT
Start: 2023-10-20

## 2023-10-19 RX ORDER — SODIUM CHLORIDE 0.9 % (FLUSH) 0.9 %
5-40 SYRINGE (ML) INJECTION PRN
Status: DISCONTINUED | OUTPATIENT
Start: 2023-10-19 | End: 2023-10-20 | Stop reason: HOSPADM

## 2023-10-19 RX ORDER — SODIUM CHLORIDE 9 MG/ML
INJECTION, SOLUTION INTRAVENOUS CONTINUOUS
Status: CANCELLED | OUTPATIENT
Start: 2023-10-20

## 2023-10-19 RX ORDER — EPINEPHRINE 1 MG/ML
0.3 INJECTION, SOLUTION, CONCENTRATE INTRAVENOUS PRN
Status: CANCELLED | OUTPATIENT
Start: 2023-10-20

## 2023-10-19 RX ORDER — DIPHENHYDRAMINE HYDROCHLORIDE 50 MG/ML
50 INJECTION INTRAMUSCULAR; INTRAVENOUS
Status: CANCELLED | OUTPATIENT
Start: 2023-10-20

## 2023-10-19 RX ORDER — SODIUM CHLORIDE 0.9 % (FLUSH) 0.9 %
5-40 SYRINGE (ML) INJECTION PRN
Status: CANCELLED | OUTPATIENT
Start: 2023-10-20

## 2023-10-19 RX ORDER — ACETAMINOPHEN 325 MG/1
650 TABLET ORAL
Status: CANCELLED | OUTPATIENT
Start: 2023-10-20

## 2023-10-19 RX ORDER — ONDANSETRON 2 MG/ML
8 INJECTION INTRAMUSCULAR; INTRAVENOUS
Status: CANCELLED | OUTPATIENT
Start: 2023-10-20

## 2023-10-19 RX ADMIN — SODIUM CHLORIDE, PRESERVATIVE FREE 10 ML: 5 INJECTION INTRAVENOUS at 11:38

## 2023-10-19 RX ADMIN — SODIUM CHLORIDE, PRESERVATIVE FREE 10 ML: 5 INJECTION INTRAVENOUS at 13:10

## 2023-10-19 RX ADMIN — SODIUM CHLORIDE 125 MG: 9 INJECTION, SOLUTION INTRAVENOUS at 12:01

## 2023-10-19 NOTE — PROGRESS NOTES
Patient tolerated IV infusion well. Remained on unit for 15 minutes after treatment. Patient alert and oriented x3. No distress noted. Vital signs stable. Patient denies any new or worsening pain. Offered patient education and/or discharge material. Patient declined. Patient denies any needs. All questions answered. D/C in stable condition. Hydroxyzine Counseling: Patient advised that the medication is sedating and not to drive a car after taking this medication.  Patient informed of potential adverse effects including but not limited to dry mouth, urinary retention, and blurry vision.  The patient verbalized understanding of the proper use and possible adverse effects of hydroxyzine.  All of the patient's questions and concerns were addressed.

## 2023-10-20 ENCOUNTER — HOSPITAL ENCOUNTER (OUTPATIENT)
Dept: INFUSION THERAPY | Age: 43
Discharge: HOME | End: 2023-10-20
Payer: MEDICARE

## 2023-10-20 ENCOUNTER — OFFICE VISIT (OUTPATIENT)
Dept: ONCOLOGY | Age: 43
End: 2023-10-20
Payer: MEDICARE

## 2023-10-20 ENCOUNTER — HOSPITAL ENCOUNTER (OUTPATIENT)
Dept: INFUSION THERAPY | Age: 43
Setting detail: INFUSION SERIES
Discharge: HOME | End: 2023-10-20
Payer: MEDICARE

## 2023-10-20 VITALS
BODY MASS INDEX: 24.4 KG/M2 | HEART RATE: 77 BPM | OXYGEN SATURATION: 97 % | SYSTOLIC BLOOD PRESSURE: 109 MMHG | TEMPERATURE: 97.5 F | DIASTOLIC BLOOD PRESSURE: 57 MMHG | WEIGHT: 161 LBS | HEIGHT: 68 IN

## 2023-10-20 VITALS
RESPIRATION RATE: 12 BRPM | TEMPERATURE: 97.8 F | OXYGEN SATURATION: 100 % | DIASTOLIC BLOOD PRESSURE: 81 MMHG | SYSTOLIC BLOOD PRESSURE: 108 MMHG | HEART RATE: 64 BPM

## 2023-10-20 DIAGNOSIS — D50.9 IRON DEFICIENCY ANEMIA, UNSPECIFIED IRON DEFICIENCY ANEMIA TYPE: Primary | ICD-10-CM

## 2023-10-20 DIAGNOSIS — E53.8 B12 DEFICIENCY: Primary | ICD-10-CM

## 2023-10-20 DIAGNOSIS — E53.8 FOLATE DEFICIENCY: ICD-10-CM

## 2023-10-20 DIAGNOSIS — E61.1 IRON DEFICIENCY: ICD-10-CM

## 2023-10-20 DIAGNOSIS — E53.8 VITAMIN B12 DEFICIENCY: ICD-10-CM

## 2023-10-20 DIAGNOSIS — D64.9 ANEMIA, UNSPECIFIED TYPE: Primary | ICD-10-CM

## 2023-10-20 PROCEDURE — 3078F DIAST BP <80 MM HG: CPT | Performed by: INTERNAL MEDICINE

## 2023-10-20 PROCEDURE — G8427 DOCREV CUR MEDS BY ELIG CLIN: HCPCS | Performed by: INTERNAL MEDICINE

## 2023-10-20 PROCEDURE — 1036F TOBACCO NON-USER: CPT | Performed by: INTERNAL MEDICINE

## 2023-10-20 PROCEDURE — 96372 THER/PROPH/DIAG INJ SC/IM: CPT

## 2023-10-20 PROCEDURE — 96365 THER/PROPH/DIAG IV INF INIT: CPT

## 2023-10-20 PROCEDURE — G8420 CALC BMI NORM PARAMETERS: HCPCS | Performed by: INTERNAL MEDICINE

## 2023-10-20 PROCEDURE — 3074F SYST BP LT 130 MM HG: CPT | Performed by: INTERNAL MEDICINE

## 2023-10-20 PROCEDURE — 2580000003 HC RX 258: Performed by: INTERNAL MEDICINE

## 2023-10-20 PROCEDURE — 1111F DSCHRG MED/CURRENT MED MERGE: CPT | Performed by: INTERNAL MEDICINE

## 2023-10-20 PROCEDURE — G8484 FLU IMMUNIZE NO ADMIN: HCPCS | Performed by: INTERNAL MEDICINE

## 2023-10-20 PROCEDURE — 99214 OFFICE O/P EST MOD 30 MIN: CPT

## 2023-10-20 PROCEDURE — 6360000002 HC RX W HCPCS: Performed by: INTERNAL MEDICINE

## 2023-10-20 PROCEDURE — 99205 OFFICE O/P NEW HI 60 MIN: CPT | Performed by: INTERNAL MEDICINE

## 2023-10-20 RX ORDER — DIPHENHYDRAMINE HYDROCHLORIDE 50 MG/ML
50 INJECTION INTRAMUSCULAR; INTRAVENOUS
Status: CANCELLED | OUTPATIENT
Start: 2023-10-20

## 2023-10-20 RX ORDER — SODIUM CHLORIDE 9 MG/ML
INJECTION, SOLUTION INTRAVENOUS CONTINUOUS
Status: CANCELLED | OUTPATIENT
Start: 2023-10-20

## 2023-10-20 RX ORDER — SODIUM CHLORIDE 9 MG/ML
INJECTION, SOLUTION INTRAVENOUS CONTINUOUS
Status: DISCONTINUED | OUTPATIENT
Start: 2023-10-20 | End: 2023-10-21 | Stop reason: HOSPADM

## 2023-10-20 RX ORDER — CYANOCOBALAMIN 1000 UG/ML
1000 INJECTION, SOLUTION INTRAMUSCULAR; SUBCUTANEOUS ONCE
Status: COMPLETED | OUTPATIENT
Start: 2023-10-20 | End: 2023-10-20

## 2023-10-20 RX ORDER — SODIUM CHLORIDE 0.9 % (FLUSH) 0.9 %
5-40 SYRINGE (ML) INJECTION PRN
Status: DISCONTINUED | OUTPATIENT
Start: 2023-10-20 | End: 2023-10-21 | Stop reason: HOSPADM

## 2023-10-20 RX ORDER — FOLIC ACID 1 MG/1
1 TABLET ORAL DAILY
Qty: 90 TABLET | Refills: 4 | Status: SHIPPED | OUTPATIENT
Start: 2023-10-20

## 2023-10-20 RX ORDER — CYANOCOBALAMIN 1000 UG/ML
1000 INJECTION, SOLUTION INTRAMUSCULAR; SUBCUTANEOUS ONCE
OUTPATIENT
Start: 2023-11-17 | End: 2023-11-17

## 2023-10-20 RX ORDER — ACETAMINOPHEN 325 MG/1
650 TABLET ORAL
Status: CANCELLED | OUTPATIENT
Start: 2023-10-20

## 2023-10-20 RX ORDER — ALBUTEROL SULFATE 90 UG/1
4 AEROSOL, METERED RESPIRATORY (INHALATION) PRN
Status: CANCELLED | OUTPATIENT
Start: 2023-10-20

## 2023-10-20 RX ORDER — ONDANSETRON 2 MG/ML
8 INJECTION INTRAMUSCULAR; INTRAVENOUS
Status: CANCELLED | OUTPATIENT
Start: 2023-10-20

## 2023-10-20 RX ORDER — SODIUM CHLORIDE 0.9 % (FLUSH) 0.9 %
5-40 SYRINGE (ML) INJECTION PRN
Status: CANCELLED | OUTPATIENT
Start: 2023-10-20

## 2023-10-20 RX ORDER — EPINEPHRINE 1 MG/ML
0.3 INJECTION, SOLUTION, CONCENTRATE INTRAVENOUS PRN
Status: CANCELLED | OUTPATIENT
Start: 2023-10-20

## 2023-10-20 RX ADMIN — SODIUM CHLORIDE, PRESERVATIVE FREE 10 ML: 5 INJECTION INTRAVENOUS at 11:58

## 2023-10-20 RX ADMIN — SODIUM CHLORIDE 125 MG: 9 INJECTION, SOLUTION INTRAVENOUS at 11:59

## 2023-10-20 RX ADMIN — SODIUM CHLORIDE 30 ML: 9 INJECTION, SOLUTION INTRAVENOUS at 12:53

## 2023-10-20 RX ADMIN — CYANOCOBALAMIN 1000 MCG: 1000 INJECTION, SOLUTION INTRAMUSCULAR; SUBCUTANEOUS at 15:20

## 2023-10-20 NOTE — PROGRESS NOTES
Patient tolerated 5th ferrlecit infusion well. Has had no issues with previous infusions. Patient alert and oriented x3. No distress noted. Vital signs stable. Patient denies any new or worsening pain. Offered patient education and/or discharge material. Patient declined. Patient denies any needs. All questions answered. D/C in stable condition.

## 2024-02-03 ENCOUNTER — APPOINTMENT (OUTPATIENT)
Dept: CT IMAGING | Age: 44
End: 2024-02-03
Payer: MEDICARE

## 2024-02-03 ENCOUNTER — HOSPITAL ENCOUNTER (EMERGENCY)
Age: 44
Discharge: HOME | End: 2024-02-03
Attending: EMERGENCY MEDICINE
Payer: MEDICARE

## 2024-02-03 VITALS
TEMPERATURE: 97.6 F | OXYGEN SATURATION: 100 % | SYSTOLIC BLOOD PRESSURE: 147 MMHG | DIASTOLIC BLOOD PRESSURE: 95 MMHG | RESPIRATION RATE: 18 BRPM | HEART RATE: 70 BPM

## 2024-02-03 DIAGNOSIS — R51.9 HEADACHE, UNSPECIFIED HEADACHE TYPE: Primary | ICD-10-CM

## 2024-02-03 LAB
ANION GAP SERPL CALCULATED.3IONS-SCNC: 9 MMOL/L (ref 7–16)
BASOPHILS # BLD: 0 K/UL (ref 0–0.2)
BASOPHILS NFR BLD: 0 % (ref 0–2)
BUN SERPL-MCNC: 10 MG/DL (ref 6–20)
CALCIUM SERPL-MCNC: 9.2 MG/DL (ref 8.6–10.2)
CHLORIDE SERPL-SCNC: 106 MMOL/L (ref 98–107)
CO2 SERPL-SCNC: 26 MMOL/L (ref 22–29)
CREAT SERPL-MCNC: 0.7 MG/DL (ref 0.5–1)
EOSINOPHIL # BLD: 0.83 K/UL (ref 0.05–0.5)
EOSINOPHILS RELATIVE PERCENT: 10 % (ref 0–6)
ERYTHROCYTE [DISTWIDTH] IN BLOOD BY AUTOMATED COUNT: 14.8 % (ref 11.5–15)
GFR SERPL CREATININE-BSD FRML MDRD: >60 ML/MIN/1.73M2
GLUCOSE SERPL-MCNC: 79 MG/DL (ref 74–99)
HCG, URINE, POC: NEGATIVE
HCT VFR BLD AUTO: 31.8 % (ref 34–48)
HGB BLD-MCNC: 9.7 G/DL (ref 11.5–15.5)
LYMPHOCYTES NFR BLD: 2.99 K/UL (ref 1.5–4)
LYMPHOCYTES RELATIVE PERCENT: 37 % (ref 20–42)
Lab: NORMAL
MCH RBC QN AUTO: 25.4 PG (ref 26–35)
MCHC RBC AUTO-ENTMCNC: 30.5 G/DL (ref 32–34.5)
MCV RBC AUTO: 83.2 FL (ref 80–99.9)
MONOCYTES NFR BLD: 0.14 K/UL (ref 0.1–0.95)
MONOCYTES NFR BLD: 2 % (ref 2–12)
MYELOCYTES ABSOLUTE COUNT: 0.07 K/UL
MYELOCYTES: 1 %
NEGATIVE QC PASS/FAIL: NORMAL
NEUTROPHILS NFR BLD: 50 % (ref 43–80)
NEUTS SEG NFR BLD: 3.97 K/UL (ref 1.8–7.3)
PLATELET # BLD AUTO: 170 K/UL (ref 130–450)
PMV BLD AUTO: 11.5 FL (ref 7–12)
POSITIVE QC PASS/FAIL: NORMAL
POTASSIUM SERPL-SCNC: 4.2 MMOL/L (ref 3.5–5)
RBC # BLD AUTO: 3.82 M/UL (ref 3.5–5.5)
RBC # BLD: ABNORMAL 10*6/UL
SODIUM SERPL-SCNC: 141 MMOL/L (ref 132–146)
WBC OTHER # BLD: 8 K/UL (ref 4.5–11.5)

## 2024-02-03 PROCEDURE — 85025 COMPLETE CBC W/AUTO DIFF WBC: CPT

## 2024-02-03 PROCEDURE — 6360000002 HC RX W HCPCS

## 2024-02-03 PROCEDURE — 96374 THER/PROPH/DIAG INJ IV PUSH: CPT

## 2024-02-03 PROCEDURE — 6370000000 HC RX 637 (ALT 250 FOR IP)

## 2024-02-03 PROCEDURE — 70450 CT HEAD/BRAIN W/O DYE: CPT

## 2024-02-03 PROCEDURE — 80048 BASIC METABOLIC PNL TOTAL CA: CPT

## 2024-02-03 PROCEDURE — 96375 TX/PRO/DX INJ NEW DRUG ADDON: CPT

## 2024-02-03 PROCEDURE — 99284 EMERGENCY DEPT VISIT MOD MDM: CPT

## 2024-02-03 RX ORDER — ACETAMINOPHEN 325 MG/1
650 TABLET ORAL ONCE
Status: COMPLETED | OUTPATIENT
Start: 2024-02-03 | End: 2024-02-03

## 2024-02-03 RX ORDER — DIPHENHYDRAMINE HYDROCHLORIDE 50 MG/ML
25 INJECTION INTRAMUSCULAR; INTRAVENOUS ONCE
Status: COMPLETED | OUTPATIENT
Start: 2024-02-03 | End: 2024-02-03

## 2024-02-03 RX ORDER — METOCLOPRAMIDE HYDROCHLORIDE 5 MG/ML
10 INJECTION INTRAMUSCULAR; INTRAVENOUS ONCE
Status: COMPLETED | OUTPATIENT
Start: 2024-02-03 | End: 2024-02-03

## 2024-02-03 RX ADMIN — METOCLOPRAMIDE 10 MG: 5 INJECTION, SOLUTION INTRAMUSCULAR; INTRAVENOUS at 21:14

## 2024-02-03 RX ADMIN — ACETAMINOPHEN 325MG 650 MG: 325 TABLET ORAL at 21:17

## 2024-02-03 RX ADMIN — DIPHENHYDRAMINE HYDROCHLORIDE 25 MG: 50 INJECTION INTRAMUSCULAR; INTRAVENOUS at 21:13

## 2024-02-03 ASSESSMENT — PAIN DESCRIPTION - DESCRIPTORS
DESCRIPTORS: ACHING
DESCRIPTORS: THROBBING;SHARP

## 2024-02-03 ASSESSMENT — PAIN DESCRIPTION - LOCATION
LOCATION: HEAD
LOCATION: HEAD;BACK

## 2024-02-03 ASSESSMENT — PAIN SCALES - GENERAL
PAINLEVEL_OUTOF10: 8
PAINLEVEL_OUTOF10: 8

## 2024-02-03 ASSESSMENT — PAIN - FUNCTIONAL ASSESSMENT: PAIN_FUNCTIONAL_ASSESSMENT: 0-10

## 2024-02-04 NOTE — ED PROVIDER NOTES
[facility] EMERGENCY DEPARTMENT  EMERGENCY DEPARTMENT ENCOUNTER        Pt Name: Mary Escobar  MRN: [id number]  Birthdate 1980  Date of evaluation: 2/3/2024  Provider: Heather Rueda MD  PCP: Geovanna Luna, RAUL - CNP  Note Started: 9:05 PM EST 2/3/24    CHIEF COMPLAINT       Chief Complaint   Patient presents with    Headache     Patient c/o headache with bump to back of head. Denies vision changes. + light sensitivity. Denies hitting head. Patient states hx of MS.       HISTORY OF PRESENT ILLNESS: 1 or more Elements   History From: patient    Limitations to history : None    Mary Escobar is a 43 y.o. female with a history of MS, HTN, asthma, anemia, morbid obesity status post sleeve gastrectomy in 2022 who presents for left-sided headache as well as a bump to the back of her head starting today.  Patient states she has had a similar bump to the back of her head couple months ago that resolved spontaneously and has been intermittent since then.  She states that reappeared today and is tender.  Patient also noted some instability with walking that seems to have resolved at this time.  Denies vision changes, fever, chest pain, shortness of breath, abdominal pain, nausea, vomiting, focal weakness of extremities or focal neurological deficits.  Denies trauma to her head.    Nursing Notes were all reviewed and agreed with or any disagreements were addressed in the HPI.        REVIEW OF EXTERNAL NOTE :       Patient was last seen in office on 10/20/2023 by oncology for anemia    REVIEW OF SYSTEMS :           Positives and Pertinent negatives as per HPI.     SURGICAL HISTORY     Past Surgical History:   Procedure Laterality Date    CHOLECYSTECTOMY      ENDOSCOPY, COLON, DIAGNOSTIC      SLEEVE GASTRECTOMY N/A 8/1/2022    GASTRECTOMY SLEEVE LAPAROSCOPIC ROBOTIC XI performed by Pascual Rizvi MD at [facility] OR    TUBAL LIGATION      TYMPANOSTOMY TUBE PLACEMENT

## 2024-03-26 ENCOUNTER — HOSPITAL ENCOUNTER (OUTPATIENT)
Dept: MRI IMAGING | Age: 44
Discharge: HOME | End: 2024-03-28
Payer: MEDICARE

## 2024-03-26 DIAGNOSIS — G35 MULTIPLE SCLEROSIS (HCC): ICD-10-CM

## 2024-03-26 PROCEDURE — 70553 MRI BRAIN STEM W/O & W/DYE: CPT

## 2024-03-26 PROCEDURE — A9579 GAD-BASE MR CONTRAST NOS,1ML: HCPCS | Performed by: RADIOLOGY

## 2024-03-26 PROCEDURE — 6360000004 HC RX CONTRAST MEDICATION: Performed by: RADIOLOGY

## 2024-03-26 RX ADMIN — GADOTERIDOL 15 ML: 279.3 INJECTION, SOLUTION INTRAVENOUS at 10:58
